# Patient Record
Sex: FEMALE | Race: WHITE | NOT HISPANIC OR LATINO | Employment: FULL TIME | URBAN - METROPOLITAN AREA
[De-identification: names, ages, dates, MRNs, and addresses within clinical notes are randomized per-mention and may not be internally consistent; named-entity substitution may affect disease eponyms.]

---

## 2022-12-16 ENCOUNTER — APPOINTMENT (OUTPATIENT)
Dept: URGENT CARE | Facility: CLINIC | Age: 57
End: 2022-12-16

## 2022-12-16 ENCOUNTER — APPOINTMENT (OUTPATIENT)
Dept: LAB | Facility: CLINIC | Age: 57
End: 2022-12-16

## 2022-12-16 DIAGNOSIS — Z02.1 PHYSICAL EXAM, PRE-EMPLOYMENT: ICD-10-CM

## 2022-12-16 LAB — RUBV IGG SERPL IA-ACNC: >175 IU/ML

## 2022-12-17 LAB
MEV IGG SER QL IA: NORMAL
MUV IGG SER QL IA: NORMAL
VZV IGG SER QL IA: NORMAL

## 2022-12-19 LAB
GAMMA INTERFERON BACKGROUND BLD IA-ACNC: 0.04 IU/ML
M TB IFN-G BLD-IMP: NEGATIVE
M TB IFN-G CD4+ BCKGRND COR BLD-ACNC: 0 IU/ML
M TB IFN-G CD4+ BCKGRND COR BLD-ACNC: 0 IU/ML
MITOGEN IGNF BCKGRD COR BLD-ACNC: 7.83 IU/ML

## 2023-02-09 ENCOUNTER — OFFICE VISIT (OUTPATIENT)
Age: 58
End: 2023-02-09

## 2023-02-09 VITALS
BODY MASS INDEX: 26.64 KG/M2 | OXYGEN SATURATION: 97 % | DIASTOLIC BLOOD PRESSURE: 82 MMHG | HEIGHT: 66 IN | RESPIRATION RATE: 16 BRPM | TEMPERATURE: 98.3 F | HEART RATE: 66 BPM | WEIGHT: 165.79 LBS | SYSTOLIC BLOOD PRESSURE: 122 MMHG

## 2023-02-09 DIAGNOSIS — I45.10 INCOMPLETE RIGHT BUNDLE BRANCH BLOCK (RBBB) DETERMINED BY ELECTROCARDIOGRAPHY: ICD-10-CM

## 2023-02-09 DIAGNOSIS — Z82.49 FAMILY HISTORY OF MI (MYOCARDIAL INFARCTION): ICD-10-CM

## 2023-02-09 DIAGNOSIS — R73.03 PREDIABETES: Primary | ICD-10-CM

## 2023-02-09 PROBLEM — Z90.710 ABSENCE OF CERVIX: Status: ACTIVE | Noted: 2018-11-06

## 2023-02-09 PROBLEM — F41.1 ANXIETY AS ACUTE REACTION TO EXCEPTIONAL STRESS: Status: ACTIVE | Noted: 2020-01-27

## 2023-02-09 PROBLEM — Z98.890: Status: ACTIVE | Noted: 2018-10-31

## 2023-02-09 PROBLEM — Z78.0 MENOPAUSE: Status: ACTIVE | Noted: 2017-04-04

## 2023-02-09 PROBLEM — Z83.3 FAMILY HISTORY OF DIABETES MELLITUS IN FIRST DEGREE RELATIVE: Status: ACTIVE | Noted: 2020-02-19

## 2023-02-09 PROBLEM — Z80.8 FAMILY HISTORY OF BASAL CELL CARCINOMA: Status: ACTIVE | Noted: 2020-02-19

## 2023-02-09 PROBLEM — F43.0 ANXIETY AS ACUTE REACTION TO EXCEPTIONAL STRESS: Status: ACTIVE | Noted: 2020-01-27

## 2023-02-09 RX ORDER — OXYBUTYNIN CHLORIDE 5 MG/1
1 TABLET, EXTENDED RELEASE ORAL DAILY
COMMUNITY
Start: 2022-05-04 | End: 2023-05-04

## 2023-02-09 RX ORDER — METFORMIN HYDROCHLORIDE 500 MG/1
500 TABLET, EXTENDED RELEASE ORAL
COMMUNITY
Start: 2022-03-21 | End: 2023-02-09 | Stop reason: SDUPTHER

## 2023-02-09 RX ORDER — METFORMIN HYDROCHLORIDE 500 MG/1
500 TABLET, EXTENDED RELEASE ORAL
Qty: 90 TABLET | Refills: 1 | Status: SHIPPED | OUTPATIENT
Start: 2023-02-09 | End: 2024-02-09

## 2023-02-09 RX ORDER — CLOBETASOL PROPIONATE 0.5 MG/G
1 CREAM TOPICAL
COMMUNITY
Start: 2022-08-24 | End: 2023-08-24

## 2023-02-09 RX ORDER — EPINEPHRINE 0.3 MG/.3ML
INJECTION SUBCUTANEOUS
COMMUNITY
Start: 2022-09-16

## 2023-02-09 RX ORDER — ESCITALOPRAM OXALATE 20 MG/1
TABLET ORAL
COMMUNITY
Start: 2022-11-11

## 2023-02-09 NOTE — ASSESSMENT & PLAN NOTE
Normal cardiac stress in 2014, asymptomatic  CABG in father around 60 y/o  Pt noted to have incomplete RBBB  Pt denies chest pain, pressure, SOB  Pt did consult with [facility] cardiology and was unhappy with her experience  Pt would like to consult [facility] at this time   Referral placed for [facility] given FH and incomplete RBBB for consult and eval

## 2023-02-09 NOTE — PROGRESS NOTES
Name: Emili Torres      : 1965      MRN: [id number]  Encounter Provider: ISIAH Garzon  Encounter Date: 2023   Encounter department: [address] [place] [address] PRIMARY CARE    Assessment & Plan     1  Prediabetes  Assessment & Plan:  Pt does have allergy to Metformin (diahrrea) but tolerate XR formula  Last A1C was 5 8  Will recheck A1C in 6 months  Orders:  -     metFORMIN (GLUCOPHAGE-XR) 500 mg 24 hr tablet; Take 1 tablet (500 mg total) by mouth daily with breakfast  -     CBC (Includes Diff/Plt) (Refl); Future  -     Comprehensive metabolic panel; Future  -     Lipid Panel with Direct LDL reflex; Future  -     TSH + Free T4; Future    2  Incomplete right bundle branch block (RBBB) determined by electrocardiography  Assessment & Plan:  Normal cardiac stress in , asymptomatic  CABG in father around 60 y/o  Pt noted to have incomplete RBBB  Pt denies chest pain, pressure, SOB  Pt did consult with [facility] cardiology and was unhappy with her experience  Pt would like to consult [facility] at this time  Referral placed for [facility] given FH and incomplete RBBB for consult and eval      Orders:  -     Ambulatory Referral to Cardiology; Future    3  BMI 26 0-26 9,adult  Assessment & Plan:  Discussed diet and exercise  Discussed low carb diet and avoiding sugary drinks  Discussed exercise  Orders:  -     CBC (Includes Diff/Plt) (Refl); Future  -     Comprehensive metabolic panel; Future  -     Lipid Panel with Direct LDL reflex; Future  -     TSH + Free T4; Future    4  Family history of MI (myocardial infarction)  Assessment & Plan:  CABG in father around 60 y/o  Pt noted to have incomplete RBBB  Pt denies chest pain, pressure, SOB  Pt did consult with [facility] cardiology and was unhappy with her experience  Pt would like to consult [facility] at this time   Referral placed for [facility] given FH and incomplete RBBB for consult and eval  Orders:  -     Ambulatory Referral to Cardiology; Future    BMI Counseling: Body mass index is 26 76 kg/m²  The BMI is above normal  Nutrition recommendations include decreasing portion sizes, encouraging healthy choices of fruits and vegetables, decreasing fast food intake, consuming healthier snacks, limiting drinks that contain sugar, moderation in carbohydrate intake, increasing intake of lean protein, reducing intake of saturated and trans fat and reducing intake of cholesterol  Exercise recommendations include moderate physical activity 150 minutes/week  Rationale for BMI follow-up plan is due to patient being overweight or obese  Depression Screening and Follow-up Plan: Patient was screened for depression during today's encounter  They screened negative with a PHQ-2 score of 0  Subjective      Pt is a new pt here to establish care  Pt does have a family hx of MI in father and brother  Pt was diagnosed with an incomplete RBBB in 2014, although stress testing in 2014 was normal  Pt was seen at [facility] cardiology and had a negative experience and is interested in a referral to [facility] for evaluation  Denies CP, SOB, pain, or difficulties with activities  Pt has a hx of prediabetes and an allergy to metformin (diarrhea), but is able to tolerate Metformin XR, and has been on this medication for quite some time without S/E  Pt's last A1C is 5 8, so controlled  Pt denies further concerns today  Recent labs reviewed from 8/22 were WNL  Review of Systems   Constitutional: Negative  HENT: Negative  Eyes: Negative  Respiratory: Negative  Cardiovascular: Negative  Gastrointestinal: Negative  Endocrine: Negative  Genitourinary: Negative  Musculoskeletal: Negative  Skin: Negative  Neurological: Negative  Hematological: Negative  Psychiatric/Behavioral: Negative          Current Outpatient Medications on File Prior to Visit   Medication Sig   • clobetasol (Dakota Rouleau) 0 05 % cream Apply 1 application topically   • EPINEPHrine (EPIPEN) 0 3 mg/0 3 mL SOAJ INJECT 0 3 MLS (0 3 MG TOTAL) INTO THE MUSCLE ONCE FOR 1 DOSE  • oxybutynin (DITROPAN-XL) 5 mg 24 hr tablet Take 1 tablet by mouth daily   • [DISCONTINUED] metFORMIN (GLUCOPHAGE-XR) 500 mg 24 hr tablet Take 500 mg by mouth daily with breakfast   • escitalopram (LEXAPRO) 20 mg tablet        Objective     /82 (BP Location: Right arm, Patient Position: Sitting, Cuff Size: Standard)   Pulse 66   Temp 98 3 °F (36 8 °C) (Tympanic)   Resp 16   Ht 5' 6" (1 676 m)   Wt 75 2 kg (165 lb 12 6 oz)   SpO2 97%   BMI 26 76 kg/m²     Physical Exam  Vitals and nursing note reviewed  Constitutional:       General: She is not in acute distress  Appearance: Normal appearance  She is not ill-appearing, toxic-appearing or diaphoretic  HENT:      Head: Normocephalic and atraumatic  Right Ear: Tympanic membrane, ear canal and external ear normal  There is no impacted cerumen  Left Ear: Tympanic membrane, ear canal and external ear normal  There is no impacted cerumen  Eyes:      General:         Right eye: No discharge  Left eye: No discharge  Conjunctiva/sclera: Conjunctivae normal    Cardiovascular:      Rate and Rhythm: Normal rate and regular rhythm  Heart sounds: Normal heart sounds  No murmur heard  No friction rub  No gallop  Pulmonary:      Effort: Pulmonary effort is normal  No respiratory distress  Breath sounds: Normal breath sounds  No wheezing or rales  Musculoskeletal:      Cervical back: Normal range of motion  Right lower leg: No edema  Left lower leg: No edema  Lymphadenopathy:      Cervical: No cervical adenopathy  Skin:     General: Skin is warm and dry  Neurological:      Mental Status: She is alert and oriented to person, place, and time     Psychiatric:         Mood and Affect: Mood normal          Behavior: Behavior normal          Thought Content: Thought content normal          Judgment: Judgment normal        Delories ISIAH Saavedra

## 2023-02-09 NOTE — ASSESSMENT & PLAN NOTE
Pt does have allergy to Metformin (diahrrea) but tolerates XR formula and has been taking for quite some time without S/E  Last A1C was 5 8  Will recheck A1C in 6 months

## 2023-02-09 NOTE — PATIENT INSTRUCTIONS
Diabetes and Exercise   WHAT YOU NEED TO KNOW:   Physical activity, such as exercise, can help keep your blood sugar level steady or improve insulin resistance  Activity can help decrease your risk for heart disease, and help you lose weight  Exercise can also help lower your A1c  Your diabetes care team will help you create an exercise plan  The plan will be based on the type of diabetes you have and your starting fitness level  DISCHARGE INSTRUCTIONS:   Call your local emergency number (911 in the [facility]) if:   You have chest pain or shortness of breath  Return to the emergency department if:   You have a low blood sugar level and it does not improve with treatment  Symptoms are trouble thinking, a pounding heartbeat, and sweating  Your blood sugar level is above 240 mg/dL and does not come down within 15 minutes of treatment  You have blurred or double vision  Your breath has a fruity, sweet smell, or your breathing is shallow  Call your doctor or diabetes care team if:   You have ketones in your blood or urine  You have a fever  Your blood sugar levels are higher than your target goals  You often have low blood sugar levels  Your skin is red, dry, warm, or swollen  You have a wound that does not heal     You have trouble coping with diabetes, or you feel anxious or depressed  You have questions or concerns about your condition or care  Tips to help you create and meet your exercise goals:   Set a goal for 150 minutes (2 5 hours) of moderate to vigorous aerobic activity each week  Aerobic activity helps your heart stay strong  Aerobic activity includes walking, bicycling, dancing, swimming, and raking leaves  Spread aerobic activity over 3 to 5 days  Do not take more than 2 days off in a row  It is best to do at least 10 minutes at a time and 30 minutes each day  You can work up to these goals  Remember that any activity is better than no activity   Over time, you can make exercise more intense or last longer  You can also add more days of exercise as your fitness level improves  Your diabetes care team can help you make a step-by-step plan to achieve your goals  Set a strength training goal of 2 to 3 times a week  Take at least 1 day off in between strength training sessions  Strength training helps you keep the muscles you have and build new muscles  Strength training includes lifting weights, climbing stairs, yoga, and fouzia chi          Older adults should include balance training 2 to 3 times each week  These include walking backwards, standing on one foot, and walking heel to toe in a straight line  Other healthy exercise tips:   Stretch before and after you exercise to prevent injury  Drink water or liquids that do not contain sugar before, during, and after exercise  Ask your dietitian or healthcare provider which liquids you should drink when you exercise  Do not sit for longer than 30 minutes at a time during your day  If you cannot walk around, at least stand up  This will help you stay active and keep your blood circulating  Exercise and blood sugar levels:  Check your blood sugar level before and after exercise, if you use insulin  Healthcare providers may tell you to change the amount of insulin you take or food you eat  If your blood sugar level is high, check your blood or urine for ketones before you exercise  Do not exercise if your blood sugar level is high and you have ketones  If your blood sugar level is less than 100 mg/dL, have a carbohydrate snack before you exercise  Examples are 4 to 6 crackers, ½ banana, 8 ounces (1 cup) of milk, or 4 ounces (½ cup) of juice  Follow up with your doctor or diabetes care team as directed:  Write down your questions so you remember to ask them during your visits    © Copyright [employer] 2022 Information is for End User's use only and may not be sold, redistributed or otherwise used for commercial purposes  All illustrations and images included in CareNotes® are the copyrighted property of A D A M [employer] Inc  or Savanna Zambrano  The above information is an  only  It is not intended as medical advice for individual conditions or treatments  Talk to your doctor, nurse or pharmacist before following any medical regimen to see if it is safe and effective for you

## 2023-02-09 NOTE — ASSESSMENT & PLAN NOTE
CABG in father around 60 y/o  Pt noted to have incomplete RBBB  Pt denies chest pain, pressure, SOB  Pt did consult with [facility] cardiology and was unhappy with her experience  Pt would like to consult [facility] at this time   Referral placed for [facility] given FH and incomplete RBBB for consult and eval

## 2023-02-09 NOTE — ASSESSMENT & PLAN NOTE
Discussed diet and exercise  Discussed low carb diet and avoiding sugary drinks  Discussed exercise

## 2023-05-05 ENCOUNTER — TELEPHONE (OUTPATIENT)
Age: 58
End: 2023-05-05

## 2023-05-05 NOTE — TELEPHONE ENCOUNTER
Patient needs the following medication refills sent to [facility]      Oxybutynin extended release 5 mg  Generic Lexapro 20 mg

## 2023-05-07 DIAGNOSIS — F41.1 ANXIETY AS ACUTE REACTION TO EXCEPTIONAL STRESS: Primary | ICD-10-CM

## 2023-05-07 DIAGNOSIS — F43.0 ANXIETY AS ACUTE REACTION TO EXCEPTIONAL STRESS: Primary | ICD-10-CM

## 2023-05-07 DIAGNOSIS — N32.81 OAB (OVERACTIVE BLADDER): ICD-10-CM

## 2023-05-07 RX ORDER — OXYBUTYNIN CHLORIDE 5 MG/1
5 TABLET, EXTENDED RELEASE ORAL DAILY
Qty: 90 TABLET | Refills: 3 | Status: SHIPPED | OUTPATIENT
Start: 2023-05-07 | End: 2024-05-06

## 2023-05-07 RX ORDER — ESCITALOPRAM OXALATE 20 MG/1
20 TABLET ORAL DAILY
Qty: 90 TABLET | Refills: 3 | Status: SHIPPED | OUTPATIENT
Start: 2023-05-07

## 2023-05-18 DIAGNOSIS — R73.03 PREDIABETES: ICD-10-CM

## 2023-05-18 RX ORDER — METFORMIN HYDROCHLORIDE 500 MG/1
500 TABLET, EXTENDED RELEASE ORAL
Qty: 90 TABLET | Refills: 1 | Status: SHIPPED | OUTPATIENT
Start: 2023-05-18

## 2023-05-18 NOTE — TELEPHONE ENCOUNTER
Patient requesting medication refill   metFORMIN (GLUCOPHAGE-XR) 500 mg 24 hr tablet TAKE 1 TABLET BY MOUTH EVERY DAY WITH BREAKFAST     90 day supply with 1 refill pending   [address]

## 2023-06-16 ENCOUNTER — CONSULT (OUTPATIENT)
Dept: CARDIOLOGY CLINIC | Facility: CLINIC | Age: 58
End: 2023-06-16
Payer: COMMERCIAL

## 2023-06-16 VITALS
TEMPERATURE: 97.7 F | SYSTOLIC BLOOD PRESSURE: 126 MMHG | HEIGHT: 66 IN | WEIGHT: 171.8 LBS | HEART RATE: 65 BPM | BODY MASS INDEX: 27.61 KG/M2 | DIASTOLIC BLOOD PRESSURE: 82 MMHG

## 2023-06-16 DIAGNOSIS — R07.2 PRECORDIAL PAIN: ICD-10-CM

## 2023-06-16 DIAGNOSIS — I34.0 MILD MITRAL REGURGITATION: ICD-10-CM

## 2023-06-16 DIAGNOSIS — Z82.49 FAMILY HISTORY OF CORONARY ARTERY DISEASE: ICD-10-CM

## 2023-06-16 DIAGNOSIS — I07.1 MILD TRICUSPID REGURGITATION: ICD-10-CM

## 2023-06-16 DIAGNOSIS — R73.03 PREDIABETES: ICD-10-CM

## 2023-06-16 DIAGNOSIS — R94.31 ABNORMAL ECG DURING EXERCISE STRESS TEST: ICD-10-CM

## 2023-06-16 DIAGNOSIS — I45.10 INCOMPLETE RIGHT BUNDLE BRANCH BLOCK (RBBB) DETERMINED BY ELECTROCARDIOGRAPHY: ICD-10-CM

## 2023-06-16 DIAGNOSIS — R00.0 RAPID HEART RATE: Primary | ICD-10-CM

## 2023-06-16 DIAGNOSIS — R94.31 SHORTENED PR INTERVAL: ICD-10-CM

## 2023-06-16 PROCEDURE — 99244 OFF/OP CNSLTJ NEW/EST MOD 40: CPT | Performed by: INTERNAL MEDICINE

## 2023-06-16 NOTE — PATIENT INSTRUCTIONS
14 day ZIO will be mailed to you  Echocardiogram to assess heart structure and function  Stress echocardiogram   Consider statin therapy (rosuvastatin) pending results of repeat lipid panel  I will review ECG with one of our EP doctors

## 2023-06-16 NOTE — PROGRESS NOTES
Cardiology Office Visit    Clayton Lubin  [id number]  1965    [facility]    [address] [address] [place] [address] [phone]      Dear ISIAH Davis,    I had the pleasure of seeing your patient at our [facility] Cardiology [place] office today 6/16/2023  As you know she is a pleasant 62y o  year old female with a medical history as described below  Reason for office visit: Evaluation for abnormal ECG and family history of coronary artery and vascular disease  1  Rapid heart rate  Assessment & Plan:  Patient notes intermittent rapid heart rates  ECG with short MI interval (no delta waves)  Prior history of syncope x 2  Recommended echocardiogram to assess heart structure and function  Stress echocardiogram    14 day ZIO monitor  Orders:  -     AMB extended holter monitor; Future; Expected date: 06/16/2023  -     Echo complete w/ contrast if indicated; Future; Expected date: 06/16/2023  -     Echo stress test, exercise; Future; Expected date: 06/16/2023  -     POCT ECG    2  Shortened MI interval  Assessment & Plan:  Short MI interval noted on ECG 6/16/2023 (92 ms) without delta waves  Prior episodes of syncope x 2  Non ischemic stress echocardiogram 2014  Exercise stress test by cardiology report was positive for ischemia prompting stress echocardiogram (presumed false positive as imaging showed no ischemia)  Testing as outlined under rapid heart rates  Will review ECG with EP to see if they have an additional recommendations  3  Precordial pain  Assessment & Plan:  Intermittent episodes of sharp centrally located chest pain  Has had episode followed by chest pressure and dyspnea on exertion  False positive exercise stress test by report of prior cardiologist    Echocardiogram 11/2014 unremarkable with normal EF and mild mitral and tricuspid regurgitation     Family history of coronary artery disease in father (MI and CABG) and brother (details pending)  Recommend updated echocardiogram and stress echocardiogram    ASCVD risk is < 10%  Orders:  -     Echo complete w/ contrast if indicated; Future; Expected date: 06/16/2023  -     Echo stress test, exercise; Future; Expected date: 06/16/2023    4  Abnormal ECG during exercise stress test  Assessment & Plan:  False positive exercise stress test documented in cardiology note 12/3/2014  Stress echocardiogram was done for follow up 12/2014 and showed no evidence of ischemia  5  Incomplete right bundle branch block (RBBB) determined by electrocardiography  Assessment & Plan:  Incomplete RBBB noted as far back as 2014  Underwent exercise stress test with presumed false positive response to exercise resulting in stress echocardiogram which showed no ischemia  Echocardiogram 12/2014 with normal EF and mild mitral and tricuspid regurgitation  Short AK interval on ECG 6/16/2023  See full plan under rapid heart rates and precordial pain  Orders:  -     Ambulatory Referral to Cardiology  -     Echo complete w/ contrast if indicated; Future; Expected date: 06/16/2023  -     Echo stress test, exercise; Future; Expected date: 06/16/2023    6  Family history of coronary artery disease  Assessment & Plan:  Father with MI in either 42's or 52's and eventual CABG  Recommend aggressive risk factor modification  Ideally would like to see LDL < 100 and perhaps < 70 given 'prediabetes'  Will plan updated lipid panel  Orders:  -     Ambulatory Referral to Cardiology  -     Echo complete w/ contrast if indicated; Future; Expected date: 06/16/2023  -     Echo stress test, exercise; Future; Expected date: 06/16/2023    7  Prediabetes  Assessment & Plan:  HA1C 5 8 08/23/2022 on metformin  8  Mild mitral regurgitation  Assessment & Plan:  Mild mitral and tricuspid regurgitation on echocardiogram 11/5/2014     No significant valvular abnormality  Updated echocardiogram ordered for rapid heart rates and precordial pain  9  Mild tricuspid regurgitation  Assessment & Plan:  See discussion under mild mitral regurgitation  JENNIE Narvaez has a past medical history of pre diabetes, depression, urge incontinence, family history of coronary artery disease and vascular disease  She has also been noted to have an abnormal ECG with an incomplete RBBB  Patient was diagnosed with hypothyroid in the past and was on levothyroxine (will need to confirm this at follow up)  Family history of father having MI in his 42's or 52's and eventual CABG  Her brother was noted to have a 'mini stroke' as well as some type of heart disease  She will try to obtain more details  She was evaluated 12/3/2014 at [facility] cardiology by Dr Marnie Bryant for chest tightness and heart pounding  underwent cardiac testing in 2014 at which time she had an exercise stress test done which was positive for ischemia resulting in stress echocardiogram which showed no evidence of ischemia  She also had a Holter monitor done which showed rare PAC's and PVC's  Echocardiogram 11/5/2014 was unremarkable  EF 60%  6/16/2023Pinojosefina Pinedomargot presents to the office today for evaluation as outlined  She tells me that she had an episode of sharp chest pain, centrally located without radiation that lasted about 10 minutes  She then noted a pressure in her chest and dyspnea on exertion which lasted a few days  She tells me that she has had sporadic episodes similar to this  She does note fatigue and snoring  No prior sleep study per report  No formal exercise program      She does note palpitations recently  Feels like heart rates are fast  ECG today notable for incomplete right bundle branch block and short SC interval (no delta waves noted)  She does have a history of synope x 2   No prodrome with either episode per her report however records 10/27/2021 suggest that she had a 'warm feeling' as well as lightheadedness  She had received her 3rd Covid vaccine the day prior  Lipid panel reviewed from 8/2022  Occasional non positional lightheadedness           Patient Active Problem List   Diagnosis   • Absence of cervix   • Anxiety as acute reaction to exceptional stress   • Contact dermatitis and other eczema, due to unspecified cause   • Mild tricuspid regurgitation   • Family history of basal cell carcinoma   • Family history of diabetes mellitus in first degree relative   • Family history of coronary artery disease   • History of decompression of both ulnar nerves   • Incomplete right bundle branch block (RBBB) determined by electrocardiography   • Menopause   • Abnormal ECG during exercise stress test   • Prediabetes   • BMI 26 0-26 9,adult   • Rapid heart rate   • Precordial pain   • Shortened WV interval   • Mild mitral regurgitation     Past Medical History:   Diagnosis Date   • Anxiety    • Depression    • Hypothyroidism     was previously on medication   • Prediabetes    • Syncope    • Urge incontinence    • Vitamin D deficiency      Social History     Socioeconomic History   • Marital status: /Civil Union     Spouse name: Damian Mcgraw   • Number of children: 6   • Years of education: Not on file   • Highest education level: Not on file   Occupational History   • Not on file   Tobacco Use   • Smoking status: Never   • Smokeless tobacco: Never   Vaping Use   • Vaping Use: Never used   Substance and Sexual Activity   • Alcohol use: Yes     Comment: social   • Drug use: Never   • Sexual activity: Not on file     Comment: Defer   Other Topics Concern   • Not on file   Social History Narrative   • Not on file     Social Determinants of Health     Financial Resource Strain: Not on file   Food Insecurity: Not on file   Transportation Needs: Not on file   Physical Activity: Not on file   Stress: Not on file   Social Connections: Not on file   Intimate Partner Violence: Not on file   Housing Stability: Not on file      Family History   Problem Relation Age of Onset   • Diabetes Mother    • Hypertension Mother    • Coronary artery disease Father         Dad  with MI 52's   at age 66   • Diabetes Father    • Basal cell carcinoma Father    • Parkinsonism Father    • Hypertension Brother    • Transient ischemic attack Brother    • Heart disease Brother    • Breast cancer Paternal Aunt      Past Surgical History:   Procedure Laterality Date   • CHOLECYSTECTOMY     • HYSTERECTOMY     • TONSILLECTOMY     • ULNAR TUNNEL RELEASE         Current Outpatient Medications:   •  clobetasol (TEMOVATE) 0 05 % cream, Apply 1 application topically, Disp: , Rfl:   •  Cyanocobalamin (VITAMIN B 12 PO), Take by mouth, Disp: , Rfl:   •  EPINEPHrine (EPIPEN) 0 3 mg/0 3 mL SOAJ, INJECT 0 3 MLS (0 3 MG TOTAL) INTO THE MUSCLE ONCE FOR 1 DOSE , Disp: , Rfl:   •  escitalopram (LEXAPRO) 20 mg tablet, Take 1 tablet (20 mg total) by mouth daily, Disp: 90 tablet, Rfl: 3  •  metFORMIN (GLUCOPHAGE-XR) 500 mg 24 hr tablet, Take 1 tablet (500 mg total) by mouth daily with breakfast, Disp: 90 tablet, Rfl: 1  •  oxybutynin (DITROPAN-XL) 5 mg 24 hr tablet, Take 1 tablet (5 mg total) by mouth daily, Disp: 90 tablet, Rfl: 3       Allergies   Allergen Reactions   • Bee Venom Swelling and Vomiting   • Metformin Diarrhea   • Other Blisters     Holter Monitor Stickers - Other reaction(s): very sensitive (even to sensitive stickers) - causes welts        Cardiac Test Results:     ECG 2023: Sinus bradycardia  56 bpm  Short KS interval  Rightward axis  Non specific ST T wave abnormality  Stress echocardiogram 2014:  Kevin protocol  9:00  10 1 METs  105% of MPHR  Resting ECG: IRBBB  Exercise EC mm ST horizontal depression  Overall left ventricular function improves with exercise and chamber size decreases with exercise     Negative exercise stress echocardiography for ischemia      Positive exercise ECG for ischemia - false positive given "normal echocardiographic images  Exercise stress test 11/2014 (not available for review)  Echocardiogram 11/5/2014:  EF 60%  Normal diastolic function  Mild mitral and tricuspid regurgitation  No significant valvular abnormality         Review of Systems   Constitutional: Positive for fatigue  Negative for activity change and appetite change  HENT: Negative for congestion, hearing loss, tinnitus and trouble swallowing  Eyes: Negative for visual disturbance  Respiratory: Negative for cough, chest tightness, shortness of breath and wheezing  Cardiovascular: Positive for chest pain and palpitations (rapid heart rates)  Negative for leg swelling  Gastrointestinal: Negative for abdominal distention, abdominal pain, nausea and vomiting  Genitourinary: Negative for difficulty urinating  Musculoskeletal: Negative for arthralgias  Skin: Negative for rash  Neurological: Positive for syncope and light-headedness  Negative for dizziness  Hematological: Does not bruise/bleed easily  Psychiatric/Behavioral: Negative for confusion  The patient is not nervous/anxious  Depression    All other systems reviewed and are negative  Vitals:    06/16/23 1432 06/16/23 1518   BP: 118/78 126/82   BP Location: Left arm    Patient Position: Sitting    Cuff Size: Adult    Pulse: 65    Temp: 97 7 °F (36 5 °C)    Weight: 77 9 kg (171 lb 12 8 oz)    Height: 5' 6\" (1 676 m)      Vitals:    06/16/23 1432   Weight: 77 9 kg (171 lb 12 8 oz)     Height: 5' 6\" (167 6 cm)     Physical Exam  Vitals reviewed  Constitutional:       Appearance: She is well-developed  HENT:      Head: Normocephalic and atraumatic  Eyes:      Conjunctiva/sclera: Conjunctivae normal       Pupils: Pupils are equal, round, and reactive to light  Neck:      Vascular: No JVD  Cardiovascular:      Rate and Rhythm: Normal rate and regular rhythm  Heart sounds: Normal heart sounds  No murmur heard  No friction rub   " No gallop  Pulmonary:      Effort: Pulmonary effort is normal       Breath sounds: Normal breath sounds  Abdominal:      General: Bowel sounds are normal       Palpations: Abdomen is soft  Musculoskeletal:      Cervical back: Normal range of motion  Skin:     General: Skin is warm and dry  Neurological:      Mental Status: She is alert and oriented to person, place, and time     Psychiatric:         Behavior: Behavior normal

## 2023-06-18 ENCOUNTER — PATIENT MESSAGE (OUTPATIENT)
Dept: CARDIOLOGY CLINIC | Facility: CLINIC | Age: 58
End: 2023-06-18

## 2023-06-18 DIAGNOSIS — E78.2 MIXED HYPERLIPIDEMIA: ICD-10-CM

## 2023-06-19 ENCOUNTER — APPOINTMENT (OUTPATIENT)
Age: 58
End: 2023-06-19

## 2023-06-19 DIAGNOSIS — Z00.8 HEALTH EXAMINATION IN POPULATION SURVEY: ICD-10-CM

## 2023-06-19 DIAGNOSIS — R73.03 PREDIABETES: ICD-10-CM

## 2023-06-19 LAB
ALBUMIN SERPL BCP-MCNC: 4 G/DL (ref 3.5–5)
ALP SERPL-CCNC: 59 U/L (ref 46–116)
ALT SERPL W P-5'-P-CCNC: 23 U/L (ref 12–78)
ANION GAP SERPL CALCULATED.3IONS-SCNC: 0 MMOL/L (ref 4–13)
AST SERPL W P-5'-P-CCNC: 19 U/L (ref 5–45)
BASOPHILS # BLD AUTO: 0.06 THOUSANDS/ÂΜL (ref 0–0.1)
BASOPHILS NFR BLD AUTO: 1 % (ref 0–1)
BILIRUB SERPL-MCNC: 0.69 MG/DL (ref 0.2–1)
BUN SERPL-MCNC: 9 MG/DL (ref 5–25)
CALCIUM SERPL-MCNC: 9.4 MG/DL (ref 8.3–10.1)
CHLORIDE SERPL-SCNC: 109 MMOL/L (ref 96–108)
CHOLEST SERPL-MCNC: 255 MG/DL
CO2 SERPL-SCNC: 30 MMOL/L (ref 21–32)
CREAT SERPL-MCNC: 0.94 MG/DL (ref 0.6–1.3)
EOSINOPHIL # BLD AUTO: 0.37 THOUSAND/ÂΜL (ref 0–0.61)
EOSINOPHIL NFR BLD AUTO: 7 % (ref 0–6)
ERYTHROCYTE [DISTWIDTH] IN BLOOD BY AUTOMATED COUNT: 11.7 % (ref 11.6–15.1)
GFR SERPL CREATININE-BSD FRML MDRD: 67 ML/MIN/1.73SQ M
GLUCOSE P FAST SERPL-MCNC: 98 MG/DL (ref 65–99)
HCT VFR BLD AUTO: 40.5 % (ref 34.8–46.1)
HDLC SERPL-MCNC: 72 MG/DL
HGB BLD-MCNC: 14.1 G/DL (ref 11.5–15.4)
IMM GRANULOCYTES # BLD AUTO: 0.02 THOUSAND/UL (ref 0–0.2)
IMM GRANULOCYTES NFR BLD AUTO: 0 % (ref 0–2)
LDLC SERPL CALC-MCNC: 170 MG/DL (ref 0–100)
LYMPHOCYTES # BLD AUTO: 2.21 THOUSANDS/ÂΜL (ref 0.6–4.47)
LYMPHOCYTES NFR BLD AUTO: 39 % (ref 14–44)
MCH RBC QN AUTO: 33 PG (ref 26.8–34.3)
MCHC RBC AUTO-ENTMCNC: 34.8 G/DL (ref 31.4–37.4)
MCV RBC AUTO: 95 FL (ref 82–98)
MONOCYTES # BLD AUTO: 0.46 THOUSAND/ÂΜL (ref 0.17–1.22)
MONOCYTES NFR BLD AUTO: 8 % (ref 4–12)
NEUTROPHILS # BLD AUTO: 2.59 THOUSANDS/ÂΜL (ref 1.85–7.62)
NEUTS SEG NFR BLD AUTO: 45 % (ref 43–75)
NRBC BLD AUTO-RTO: 0 /100 WBCS
PLATELET # BLD AUTO: 281 THOUSANDS/UL (ref 149–390)
PMV BLD AUTO: 9.6 FL (ref 8.9–12.7)
POTASSIUM SERPL-SCNC: 4.6 MMOL/L (ref 3.5–5.3)
PROT SERPL-MCNC: 7.3 G/DL (ref 6.4–8.4)
RBC # BLD AUTO: 4.27 MILLION/UL (ref 3.81–5.12)
SODIUM SERPL-SCNC: 139 MMOL/L (ref 135–147)
TRIGL SERPL-MCNC: 67 MG/DL
WBC # BLD AUTO: 5.71 THOUSAND/UL (ref 4.31–10.16)

## 2023-06-19 PROCEDURE — 83036 HEMOGLOBIN GLYCOSYLATED A1C: CPT

## 2023-06-19 PROCEDURE — 36415 COLL VENOUS BLD VENIPUNCTURE: CPT

## 2023-06-19 PROCEDURE — 80061 LIPID PANEL: CPT

## 2023-06-19 PROCEDURE — 85025 COMPLETE CBC W/AUTO DIFF WBC: CPT

## 2023-06-19 PROCEDURE — 80053 COMPREHEN METABOLIC PANEL: CPT

## 2023-06-19 PROCEDURE — 84443 ASSAY THYROID STIM HORMONE: CPT

## 2023-06-20 PROBLEM — I34.0 MILD MITRAL REGURGITATION: Status: ACTIVE | Noted: 2023-06-20

## 2023-06-20 LAB
EST. AVERAGE GLUCOSE BLD GHB EST-MCNC: 117 MG/DL
HBA1C MFR BLD: 5.7 %
TSH SERPL DL<=0.05 MIU/L-ACNC: 3.46 UIU/ML (ref 0.45–4.5)

## 2023-06-20 PROCEDURE — 93000 ELECTROCARDIOGRAM COMPLETE: CPT | Performed by: INTERNAL MEDICINE

## 2023-06-20 NOTE — ASSESSMENT & PLAN NOTE
Father with MI in either 42's or 52's and eventual CABG  Recommend aggressive risk factor modification  Ideally would like to see LDL < 100 and perhaps < 70 given 'prediabetes'  Will plan updated lipid panel

## 2023-06-20 NOTE — ASSESSMENT & PLAN NOTE
False positive exercise stress test documented in cardiology note 12/3/2014  Stress echocardiogram was done for follow up 12/2014 and showed no evidence of ischemia

## 2023-06-20 NOTE — ASSESSMENT & PLAN NOTE
Mild mitral and tricuspid regurgitation on echocardiogram 11/5/2014  No significant valvular abnormality  Updated echocardiogram ordered for rapid heart rates and precordial pain

## 2023-06-20 NOTE — ASSESSMENT & PLAN NOTE
Short VT interval noted on ECG 6/16/2023 (92 ms) without delta waves  Prior episodes of syncope x 2  Non ischemic stress echocardiogram 2014  Exercise stress test by cardiology report was positive for ischemia prompting stress echocardiogram (presumed false positive as imaging showed no ischemia)  Testing as outlined under rapid heart rates  Will review ECG with EP to see if they have an additional recommendations

## 2023-06-20 NOTE — ASSESSMENT & PLAN NOTE
Intermittent episodes of sharp centrally located chest pain  Has had episode followed by chest pressure and dyspnea on exertion  False positive exercise stress test by report of prior cardiologist    Echocardiogram 11/2014 unremarkable with normal EF and mild mitral and tricuspid regurgitation  Family history of coronary artery disease in father (MI and CABG) and brother (details pending)  Recommend updated echocardiogram and stress echocardiogram    ASCVD risk is < 10%

## 2023-06-20 NOTE — ASSESSMENT & PLAN NOTE
Incomplete RBBB noted as far back as 2014  Underwent exercise stress test with presumed false positive response to exercise resulting in stress echocardiogram which showed no ischemia  Echocardiogram 12/2014 with normal EF and mild mitral and tricuspid regurgitation  Short AK interval on ECG 6/16/2023  See full plan under rapid heart rates and precordial pain

## 2023-06-20 NOTE — ASSESSMENT & PLAN NOTE
Patient notes intermittent rapid heart rates  ECG with short MI interval (no delta waves)  Prior history of syncope x 2  Recommended echocardiogram to assess heart structure and function  Stress echocardiogram    14 day ZIO monitor

## 2023-06-29 RX ORDER — ROSUVASTATIN CALCIUM 5 MG/1
5 TABLET, COATED ORAL DAILY
Qty: 30 TABLET | Refills: 11 | Status: SHIPPED | OUTPATIENT
Start: 2023-06-29

## 2023-07-17 ENCOUNTER — CLINICAL SUPPORT (OUTPATIENT)
Dept: CARDIOLOGY CLINIC | Facility: CLINIC | Age: 58
End: 2023-07-17
Payer: COMMERCIAL

## 2023-07-17 DIAGNOSIS — R00.0 RAPID HEART RATE: ICD-10-CM

## 2023-07-17 PROCEDURE — 93248 EXT ECG>7D<15D REV&INTERPJ: CPT | Performed by: NURSE PRACTITIONER

## 2023-07-18 ENCOUNTER — TELEPHONE (OUTPATIENT)
Dept: NON INVASIVE DIAGNOSTICS | Facility: HOSPITAL | Age: 58
End: 2023-07-18

## 2023-07-18 DIAGNOSIS — I47.1 SVT (SUPRAVENTRICULAR TACHYCARDIA) (HCC): Primary | ICD-10-CM

## 2023-07-18 RX ORDER — METOPROLOL SUCCINATE 25 MG/1
12.5 TABLET, EXTENDED RELEASE ORAL DAILY
Qty: 15 TABLET | Refills: 0 | Status: SHIPPED | OUTPATIENT
Start: 2023-07-18 | End: 2023-07-28 | Stop reason: SDUPTHER

## 2023-07-18 NOTE — TELEPHONE ENCOUNTER
I spoke with Radha Olvera. We reviewed Dr. Hebert Butt result note regarding her ZIO results. She is agreeable to metoprolol succinate 12.5 mg daily. She is aware this is a half tablet. I ordered a 1 month supply to local and she will message me in the next 2 weeks - if she tolerates I will send to mail order. I also let her know I sent the results to Dr. Hiwot Chang to review and will let her know if he has any additional recommendations.

## 2023-07-18 NOTE — TELEPHONE ENCOUNTER
I attempted to reach [facility] to review the results and discuss beta blocker. I left her a voicemail asking her to give us a call back. OK to page me if she calls.   Thanks

## 2023-07-18 NOTE — TELEPHONE ENCOUNTER
----- Message from [facility], DO sent at 7/17/2023 10:17 PM EDT -----  Patient had a min HR of 41 bpm, max HR of 203 bpm, and avg HR of 64 bpm.  Predominant underlying rhythm was Sinus Rhythm. QRS morphology changes werepresent throughout recording. 29 Supraventricular Tachycardia runs occurred, the run with the fastest interval lasting 10.7 secs with a max rate of 203 bpm (avg 180bpm); the run with the fastest interval was also the longest. Supraventricular Tachycardia was detected within +/- 45 seconds of symptomatic patient event(s). Isolated SVEs were rare (<1.0%), SVE Couplets were rare (<1.0%), and SVE Triplets were rare (<1.0%). Isolated VEs were rare (<1.0%), and no VE Couplets or VE Triplets were present. Strips above reviewed. Agree with findings as documented. ZIO 6/21-7/5/2023:  Min HR 41. Max HR in . Avg HR 64 bpm.   29 runs of SVT with the longest run lasting 10.7 seconds with an average heart rate of 180 bpm.   Rare PAC's. Rare PVC's. 13 triggers and 10 diary entries. Most symptoms correlated with normal sinus rhythm. Fluttering/racing symptoms correlated with the longest run of SVT and one episode of possible atrial tachycardia and one episode of normal sinus rhythm with transition into possible atrial tachycardia. Will review with EP (Dr. Lexy Shin) to get his opinion on rhythm. Patient may benefit from the addition of low dose beta blocker for now. ## Kala Bernstein, can you please see if Dr. Lexy Shin can review and give his opinion on rhythm. I would recommend adding low dose beta blocker for now. Can you please call patient to discuss? Thank you.

## 2023-07-18 NOTE — TELEPHONE ENCOUNTER
Pt CHINO stating that she is returning a call from [facility] please call [phone]

## 2023-07-28 ENCOUNTER — HOSPITAL ENCOUNTER (OUTPATIENT)
Dept: NON INVASIVE DIAGNOSTICS | Facility: HOSPITAL | Age: 58
Discharge: HOME | End: 2023-07-28
Attending: INTERNAL MEDICINE
Payer: COMMERCIAL

## 2023-07-28 ENCOUNTER — TELEPHONE (OUTPATIENT)
Dept: CARDIOLOGY CLINIC | Facility: CLINIC | Age: 58
End: 2023-07-28

## 2023-07-28 VITALS
DIASTOLIC BLOOD PRESSURE: 82 MMHG | SYSTOLIC BLOOD PRESSURE: 126 MMHG | WEIGHT: 171.74 LBS | BODY MASS INDEX: 27.6 KG/M2 | HEART RATE: 55 BPM | HEIGHT: 66 IN

## 2023-07-28 VITALS — BODY MASS INDEX: 27.48 KG/M2 | HEIGHT: 66 IN | WEIGHT: 171 LBS

## 2023-07-28 DIAGNOSIS — R00.0 RAPID HEART RATE: ICD-10-CM

## 2023-07-28 DIAGNOSIS — R07.2 PRECORDIAL PAIN: ICD-10-CM

## 2023-07-28 DIAGNOSIS — I45.10 INCOMPLETE RIGHT BUNDLE BRANCH BLOCK (RBBB) DETERMINED BY ELECTROCARDIOGRAPHY: ICD-10-CM

## 2023-07-28 DIAGNOSIS — I47.1 SVT (SUPRAVENTRICULAR TACHYCARDIA) (HCC): ICD-10-CM

## 2023-07-28 DIAGNOSIS — Z82.49 FAMILY HISTORY OF CORONARY ARTERY DISEASE: ICD-10-CM

## 2023-07-28 LAB
AORTIC ROOT: 3 CM
AORTIC VALVE MEAN VELOCITY: 8.1 M/S
APICAL FOUR CHAMBER EJECTION FRACTION: 64 %
ASCENDING AORTA: 2.8 CM
AV AREA BY CONTINUOUS VTI: 2.1 CM2
AV AREA PEAK VELOCITY: 2 CM2
AV LVOT MEAN GRADIENT: 1 MMHG
AV LVOT PEAK GRADIENT: 3 MMHG
AV MEAN GRADIENT: 3 MMHG
AV PEAK GRADIENT: 6 MMHG
AV VALVE AREA: 2.06 CM2
AV VELOCITY RATIO: 0.7
DOP CALC AO PEAK VEL: 1.21 M/S
DOP CALC AO VTI: 28.36 CM
DOP CALC LVOT AREA: 2.83 CM2
DOP CALC LVOT CARDIAC INDEX: 1.59 L/MIN/M2
DOP CALC LVOT CARDIAC OUTPUT: 2.98 L/MIN
DOP CALC LVOT DIAMETER: 1.9 CM
DOP CALC LVOT PEAK VEL VTI: 20.61 CM
DOP CALC LVOT PEAK VEL: 0.85 M/S
DOP CALC LVOT STROKE INDEX: 30.5 ML/M2
DOP CALC LVOT STROKE VOLUME: 58.41
E WAVE DECELERATION TIME: 256 MS
FRACTIONAL SHORTENING: 36 (ref 28–44)
INTERVENTRICULAR SEPTUM IN DIASTOLE (PARASTERNAL SHORT AXIS VIEW): 0.8 CM
INTERVENTRICULAR SEPTUM: 0.8 CM (ref 0.6–1.1)
IVC: 18 MM
LAAS-AP2: 16.5 CM2
LAAS-AP4: 12.3 CM2
LEFT ATRIUM SIZE: 3.1 CM
LEFT ATRIUM VOLUME (MOD BIPLANE): 38 ML
LEFT INTERNAL DIMENSION IN SYSTOLE: 2.7 CM (ref 2.1–4)
LEFT VENTRICLE DIASTOLIC VOLUME (MOD BIPLANE): 58 ML
LEFT VENTRICLE SYSTOLIC VOLUME (MOD BIPLANE): 22 ML
LEFT VENTRICULAR INTERNAL DIMENSION IN DIASTOLE: 4.2 CM (ref 3.5–6)
LEFT VENTRICULAR POSTERIOR WALL IN END DIASTOLE: 0.8 CM
LEFT VENTRICULAR STROKE VOLUME: 53 ML
LV EF: 63 %
LVSV (TEICH): 53 ML
MAX HR PERCENT: 93 %
MAX HR: 151 BPM
MV E'TISSUE VEL-LAT: 10 CM/S
MV E'TISSUE VEL-SEP: 13 CM/S
MV PEAK A VEL: 0.29 M/S
MV PEAK E VEL: 76 CM/S
MV STENOSIS PRESSURE HALF TIME: 74 MS
MV VALVE AREA P 1/2 METHOD: 2.97
PV PEAK GRADIENT: 2 MMHG
RA PRESSURE ESTIMATED: 3 MMHG
RATE PRESSURE PRODUCT: NORMAL
RIGHT ATRIUM AREA SYSTOLE A4C: 18.5 CM2
RIGHT VENTRICLE ID DIMENSION: 4 CM
SL CV LEFT ATRIUM LENGTH A2C: 5.4 CM
SL CV LV EF: 6064
SL CV PED ECHO LEFT VENTRICLE DIASTOLIC VOLUME (MOD BIPLANE) 2D: 79 ML
SL CV PED ECHO LEFT VENTRICLE SYSTOLIC VOLUME (MOD BIPLANE) 2D: 27 ML
SL CV STRESS RECOVERY BP: NORMAL MMHG
SL CV STRESS RECOVERY HR: 66 BPM
SL CV STRESS RECOVERY O2 SAT: 98 %
SL CV STRESS STAGE REACHED: 3
STRESS ANGINA INDEX: 0
STRESS BASELINE BP: NORMAL MMHG
STRESS BASELINE HR: 50 BPM
STRESS DUKE TREADMILL SCORE: 4
STRESS O2 SAT REST: 96 %
STRESS PEAK HR: 151 BPM
STRESS POST ESTIMATED WORKLOAD: 10.1 METS
STRESS POST EXERCISE DUR MIN: 9 MIN
STRESS POST EXERCISE DUR SEC: 0 SEC
STRESS POST O2 SAT PEAK: 97 %
STRESS POST PEAK BP: 180 MMHG
STRESS ST ELEVATION: 1 MM
TRICUSPID ANNULAR PLANE SYSTOLIC EXCURSION: 2.8 CM

## 2023-07-28 PROCEDURE — 93350 STRESS TTE ONLY: CPT

## 2023-07-28 PROCEDURE — 93306 TTE W/DOPPLER COMPLETE: CPT

## 2023-07-28 RX ORDER — METOPROLOL SUCCINATE 25 MG/1
25 TABLET, EXTENDED RELEASE ORAL DAILY
Qty: 90 TABLET | Refills: 3 | Status: SHIPPED | OUTPATIENT
Start: 2023-07-28

## 2023-07-28 NOTE — TELEPHONE ENCOUNTER
I spoke with Ivory Dumont. Her echocardiogram and stress echo showed normal heart function with no evidence of ischemia. She is tolerating the full 25 mg of metoprolol succinate - I sent 90 day supply to mail order. She does notice improvement in her palpitations at the full 25 mg dose. She will follow up with me in September.

## 2023-08-30 NOTE — PROGRESS NOTES
ADULT ANNUAL [facility]    NAME: Dallas Rome  AGE: 62 y.o. 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 She had a colonoscopy in , the report indicates that she is due for a repeat colonoscopy in 2 years (). However, the patient reports that she called their office last year and they indicated that the colonoscopy is not due until . Message sent to Care Gap team to contact [facility] office to verify. Currently follows with [facility], scheduled for repeat labs and f/u on     Repeat labs ordered for patient to complete 1 week prior to 6-month follow-up (repeat a1c and bmp)    Patient is due for a mammogram in early December, order placed. Patient is also due for a bone density screening due to hysterectomy history, order placed.     Tdap vaccine administered as patient is due and is expecting a new grandchild in March    6-month follow-up    Problem List Items Addressed This Visit        Other    Prediabetes    Relevant Orders    Hemoglobin E6U    Basic metabolic panel   Other Visit Diagnoses     Annual physical exam    -  Primary    Symptomatic premature menopause        Relevant Orders    DXA bone density spine hip and pelvis    Need for hepatitis C screening test        Relevant Orders    Hepatitis C Antibody    Screening for HIV (human immunodeficiency virus)        Relevant Orders    HIV 1/2 AG/AB w Reflex [facility] for 2 yr old and above    Encounter for administration of vaccine        Relevant Orders    TDAP VACCINE GREATER THAN OR EQUAL TO 6YO IM    Encounter for screening mammogram for breast cancer        Relevant Orders    Mammo screening bilateral w 3d & cad          Immunizations and preventive care screenings were discussed with patient today. Appropriate education was printed on patient's after visit summary. Counseling:  Exercise: the importance of regular exercise/physical activity was discussed. Recommend exercise 3-5 times per week for at least 30 minutes. Return in about 6 months (around 2/29/2024) for Recheck. Chief Complaint:     Chief Complaint   Patient presents with   • Physical Exam   • Care Gap Request     Colorectal screening due. Osteoporosis screening due. HIV and Hep C screening due. History of Present Illness:     Adult Annual Physical   Patient here for a comprehensive physical exam. The patient reports no problems. Diet and Physical Activity  Diet/Nutrition: well balanced diet. Exercise: moderate cardiovascular exercise. Depression Screening  PHQ-2/9 Depression Screening         General Health  Sleep: sleeps well. Hearing: normal - bilateral.  Vision: no vision problems. Dental: regular dental visits. /GYN Health  Patient is: postmenopausal     Review of Systems:     Review of Systems   Constitutional: Negative. HENT: Negative. Eyes: Negative. Respiratory: Negative. Cardiovascular: Negative. Negative for chest pain, palpitations and leg swelling. Gastrointestinal: Negative. Endocrine: Negative. Genitourinary: Negative. Musculoskeletal: Negative. Skin: Negative. Allergic/Immunologic: Negative. Neurological: Negative. Hematological: Negative. Psychiatric/Behavioral: Negative.        Past Medical History:     Past Medical History:   Diagnosis Date   • Anxiety    • Depression    • Hypothyroidism     was previously on medication   • Prediabetes    • Syncope    • Urge incontinence    • Vitamin D deficiency       Past Surgical History:     Past Surgical History:   Procedure Laterality Date   • CHOLECYSTECTOMY     • HYSTERECTOMY     • TONSILLECTOMY     • ULNAR TUNNEL RELEASE        Social History:     Social History     Socioeconomic History   • Marital status: /Civil Union     Spouse name: Cosmo Ospina   • Number of children: 6   • Years of education: None   • Highest education level: None   Occupational History   • None   Tobacco Use   • Smoking status: Never   • Smokeless tobacco: Never   Vaping Use   • Vaping Use: Never used   Substance and Sexual Activity   • Alcohol use: Yes     Comment: social   • Drug use: Never   • Sexual activity: None     Comment: Defer   Other Topics Concern   • None   Social History Narrative   • None     Social Determinants of Health     Financial Resource Strain: Not on file   Food Insecurity: Not on file   Transportation Needs: Not on file   Physical Activity: Not on file   Stress: Not on file   Social Connections: Not on file   Intimate Partner Violence: Not on file   Housing Stability: Not on file      Family History:     Family History   Problem Relation Age of Onset   • Diabetes Mother    • Hypertension Mother    • Coronary artery disease Father         Dad  with MI 52's.  at age 66   • Diabetes Father    • Basal cell carcinoma Father    • Parkinsonism Father    • Hypertension Brother    • Transient ischemic attack Brother    • Heart disease Brother    • Breast cancer Paternal Aunt       Current Medications:     Current Outpatient Medications   Medication Sig Dispense Refill   • clobetasol (TEMOVATE) 0.05 % cream Apply 1 application topically     • Cyanocobalamin (VITAMIN B 12 PO) Take by mouth     • EPINEPHrine (EPIPEN) 0.3 mg/0.3 mL SOAJ INJECT 0.3 MLS (0.3 MG TOTAL) INTO THE MUSCLE ONCE FOR 1 DOSE.      • escitalopram (LEXAPRO) 20 mg tablet Take 1 tablet (20 mg total) by mouth daily 90 tablet 3   • metFORMIN (GLUCOPHAGE-XR) 500 mg 24 hr tablet Take 1 tablet (500 mg total) by mouth daily with breakfast 90 tablet 1   • metoprolol succinate (TOPROL-XL) 25 mg 24 hr tablet Take 1 tablet (25 mg total) by mouth daily 90 tablet 3   • oxybutynin (DITROPAN-XL) 5 mg 24 hr tablet Take 1 tablet (5 mg total) by mouth daily 90 tablet 3   • rosuvastatin (CRESTOR) 5 mg tablet Take 1 tablet (5 mg total) by mouth daily 30 tablet 11     No current facility-administered medications for this visit. Allergies: Allergies   Allergen Reactions   • Bee Venom Swelling and Vomiting   • Metformin Diarrhea   • Other Blisters     Holter Monitor Stickers - Other reaction(s): very sensitive (even to sensitive stickers) - causes welts       Physical Exam:     /62 (BP Location: Left arm, Patient Position: Sitting, Cuff Size: Standard)   Pulse 60   Ht 5' 6.5" (1.689 m)   Wt 76.9 kg (169 lb 8.5 oz)   SpO2 97%   BMI 26.95 kg/m²     Physical Exam  Vitals and nursing note reviewed. Constitutional:       Appearance: Normal appearance. HENT:      Head: Normocephalic. Right Ear: Tympanic membrane normal.      Left Ear: Tympanic membrane normal.      Nose: Nose normal. No congestion. Mouth/Throat:      Mouth: Mucous membranes are moist.      Pharynx: Oropharynx is clear. No oropharyngeal exudate. Eyes:      Extraocular Movements: Extraocular movements intact. Conjunctiva/sclera: Conjunctivae normal.      Pupils: Pupils are equal, round, and reactive to light. Cardiovascular:      Rate and Rhythm: Normal rate and regular rhythm. Pulses: Normal pulses. Heart sounds: Normal heart sounds. No murmur heard. Pulmonary:      Effort: Pulmonary effort is normal. No respiratory distress. Breath sounds: Normal breath sounds. No wheezing. Abdominal:      General: Bowel sounds are normal.      Palpations: Abdomen is soft. Tenderness: There is no abdominal tenderness. Musculoskeletal:         General: No swelling, tenderness, deformity or signs of injury. Normal range of motion. Cervical back: Normal range of motion and neck supple. No tenderness. Right lower leg: No edema. Left lower leg: No edema. Lymphadenopathy:      Cervical: No cervical adenopathy.    Skin:     General: Skin is warm and dry. Capillary Refill: Capillary refill takes less than 2 seconds. Findings: No rash. Neurological:      General: No focal deficit present. Mental Status: She is alert and oriented to person, place, and time. Cranial Nerves: No cranial nerve deficit. Sensory: No sensory deficit. Motor: No weakness.       Coordination: Coordination normal.      Gait: Gait normal.      Deep Tendon Reflexes: Reflexes normal.   Psychiatric:         Mood and Affect: Mood normal.         Behavior: Behavior normal.          Meghan Humphries, [facility]

## 2023-08-31 ENCOUNTER — OFFICE VISIT (OUTPATIENT)
Age: 58
End: 2023-08-31

## 2023-08-31 ENCOUNTER — TELEPHONE (OUTPATIENT)
Dept: ADMINISTRATIVE | Facility: OTHER | Age: 58
End: 2023-08-31

## 2023-08-31 VITALS
HEART RATE: 60 BPM | BODY MASS INDEX: 26.61 KG/M2 | OXYGEN SATURATION: 97 % | DIASTOLIC BLOOD PRESSURE: 62 MMHG | SYSTOLIC BLOOD PRESSURE: 100 MMHG | WEIGHT: 169.53 LBS | HEIGHT: 67 IN

## 2023-08-31 DIAGNOSIS — E28.310 SYMPTOMATIC PREMATURE MENOPAUSE: ICD-10-CM

## 2023-08-31 DIAGNOSIS — R73.03 PREDIABETES: ICD-10-CM

## 2023-08-31 DIAGNOSIS — Z11.59 NEED FOR HEPATITIS C SCREENING TEST: ICD-10-CM

## 2023-08-31 DIAGNOSIS — Z00.00 ANNUAL PHYSICAL EXAM: Primary | ICD-10-CM

## 2023-08-31 DIAGNOSIS — Z23 ENCOUNTER FOR ADMINISTRATION OF VACCINE: ICD-10-CM

## 2023-08-31 DIAGNOSIS — Z11.4 SCREENING FOR HIV (HUMAN IMMUNODEFICIENCY VIRUS): ICD-10-CM

## 2023-08-31 DIAGNOSIS — Z12.31 ENCOUNTER FOR SCREENING MAMMOGRAM FOR BREAST CANCER: ICD-10-CM

## 2023-08-31 NOTE — TELEPHONE ENCOUNTER
----- Message from Lorrie Cook, [facility] sent at 8/31/2023  8:31 AM EDT -----  Regarding: Care Gap Request  08/31/23 8:31 AM    Hello, our patient attached above has had CRC: Colonoscopy completed/performed. Please assist in updating the patient chart by making an External outreach to [address] [place] [address] ([facility]) facility located in [place], [place]. The date of service is 2020. The report we have on file indicates that the patient is due for a repeat colonoscopy in 2022. However, the patient reports that she was verbally informed by their office that she is not due until 2025. Can we please contact their office to verify. Thank you very much!     Thank you,  ISIAH Vega  [facility] Laws

## 2023-08-31 NOTE — PATIENT INSTRUCTIONS
[address] [facility] [place] [phone]     Complete lab work approx 1 week prior to follow-up

## 2023-09-07 ENCOUNTER — TELEPHONE (OUTPATIENT)
Dept: ADMINISTRATIVE | Facility: OTHER | Age: 58
End: 2023-09-07

## 2023-09-07 NOTE — TELEPHONE ENCOUNTER
Upon review of the In Basket request we were able to locate, review, and update the patient chart as requested for CRC: Colonoscopy. Any additional questions or concerns should be emailed to the Practice Liaisons via the appropriate education email address, please do not reply via In Basket.     Thank you  Nell Silva

## 2023-09-07 NOTE — TELEPHONE ENCOUNTER
----- Message from Adam Holland, [address] sent at 8/31/2023  8:31 AM EDT -----  Regarding: Care Gap Request  08/31/23 8:31 AM    Hello, our patient attached above has had CRC: Colonoscopy completed/performed. Please assist in updating the patient chart by making an External outreach to [address] [place] [address] ([facility]) facility located in [place], [place]. The date of service is 2020. The report we have on file indicates that the patient is due for a repeat colonoscopy in 2022. However, the patient reports that she was verbally informed by their office that she is not due until 2025. Can we please contact their office to verify. Thank you very much!     Thank you,  ISIAH Del Rosario  [facility]

## 2023-09-11 ENCOUNTER — APPOINTMENT (OUTPATIENT)
Age: 58
End: 2023-09-11
Payer: COMMERCIAL

## 2023-09-11 DIAGNOSIS — E78.2 MIXED HYPERLIPIDEMIA: ICD-10-CM

## 2023-09-11 LAB
ALBUMIN SERPL BCP-MCNC: 4.1 G/DL (ref 3.5–5)
ALP SERPL-CCNC: 46 U/L (ref 34–104)
ALT SERPL W P-5'-P-CCNC: 19 U/L (ref 7–52)
AST SERPL W P-5'-P-CCNC: 24 U/L (ref 13–39)
BILIRUB DIRECT SERPL-MCNC: 0.1 MG/DL (ref 0–0.2)
BILIRUB SERPL-MCNC: 0.65 MG/DL (ref 0.2–1)
CHOLEST SERPL-MCNC: 146 MG/DL
HDLC SERPL-MCNC: 55 MG/DL
LDLC SERPL CALC-MCNC: 76 MG/DL (ref 0–100)
LDLC SERPL DIRECT ASSAY-MCNC: 85 MG/DL (ref 0–100)
NONHDLC SERPL-MCNC: 91 MG/DL
PROT SERPL-MCNC: 6.6 G/DL (ref 6.4–8.4)
TRIGL SERPL-MCNC: 75 MG/DL

## 2023-09-11 PROCEDURE — 80076 HEPATIC FUNCTION PANEL: CPT

## 2023-09-11 PROCEDURE — 36415 COLL VENOUS BLD VENIPUNCTURE: CPT

## 2023-09-11 PROCEDURE — 80061 LIPID PANEL: CPT

## 2023-09-11 PROCEDURE — 83721 ASSAY OF BLOOD LIPOPROTEIN: CPT

## 2023-09-22 ENCOUNTER — OFFICE VISIT (OUTPATIENT)
Dept: CARDIOLOGY CLINIC | Facility: CLINIC | Age: 58
End: 2023-09-22
Payer: COMMERCIAL

## 2023-09-22 VITALS
BODY MASS INDEX: 27.8 KG/M2 | HEART RATE: 53 BPM | TEMPERATURE: 98 F | HEIGHT: 66 IN | WEIGHT: 173 LBS | OXYGEN SATURATION: 98 % | SYSTOLIC BLOOD PRESSURE: 118 MMHG | DIASTOLIC BLOOD PRESSURE: 60 MMHG

## 2023-09-22 DIAGNOSIS — I34.0 MILD MITRAL REGURGITATION: ICD-10-CM

## 2023-09-22 DIAGNOSIS — K21.9 GASTROESOPHAGEAL REFLUX DISEASE WITHOUT ESOPHAGITIS: ICD-10-CM

## 2023-09-22 DIAGNOSIS — R94.31 SHORTENED PR INTERVAL: ICD-10-CM

## 2023-09-22 DIAGNOSIS — E78.00 PURE HYPERCHOLESTEROLEMIA: ICD-10-CM

## 2023-09-22 DIAGNOSIS — R73.03 PREDIABETES: ICD-10-CM

## 2023-09-22 DIAGNOSIS — R00.0 RAPID HEART RATE: Primary | ICD-10-CM

## 2023-09-22 DIAGNOSIS — Z82.49 FAMILY HISTORY OF CORONARY ARTERY DISEASE: ICD-10-CM

## 2023-09-22 DIAGNOSIS — R94.31 ABNORMAL ECG DURING EXERCISE STRESS TEST: ICD-10-CM

## 2023-09-22 DIAGNOSIS — R07.2 PRECORDIAL PAIN: ICD-10-CM

## 2023-09-22 PROCEDURE — 99214 OFFICE O/P EST MOD 30 MIN: CPT | Performed by: NURSE PRACTITIONER

## 2023-09-22 RX ORDER — MULTIVITAMIN WITH IRON
250 TABLET ORAL
Qty: 90 TABLET | Refills: 3 | Status: SHIPPED | OUTPATIENT
Start: 2023-09-22

## 2023-09-22 RX ORDER — OMEPRAZOLE 40 MG/1
40 CAPSULE, DELAYED RELEASE ORAL DAILY
Qty: 90 CAPSULE | Refills: 0 | Status: SHIPPED | OUTPATIENT
Start: 2023-09-22

## 2023-09-22 NOTE — PROGRESS NOTES
Cardiology Follow Up    Alvin Reji  1965  [id number]  [facility]  [address]  [address] [facility] [place]  [phone]    Edilma Cavanaugh presents for follow up of palpitations, chest pressure, family history of CAD. To review test results. 1. Rapid heart rate  Assessment & Plan:  Patient notes intermittent rapid heart rates. ECG with short CO interval (no delta waves). Prior history of syncope x 2.   Echocardiogram and stress echo 7/28/2023 are unrevealing. 14 day ZIO monitor 6/15-7/5/2023 show 29 runs of SVT, longest 10.7 seconds. 13 triggers and 10 diary entries primarily correlate with NSR, with one episode of fluttering/racing correlating with the longest SVT episode. She has since been started on metoprolol succinate 25 mg daily with some relief. Will trial metoprolol succinate 12.5 mg BID. Add magnesium supplement today. Consider sleep study - will discuss again at follow up. Orders:  -     Magnesium 250 MG TABS; Take 1 tablet (250 mg total) by mouth daily at bedtime  -     Magnesium    2. Precordial pain  Assessment & Plan:  Intermittent episodes of sharp centrally located chest pain. Has had episode followed by chest pressure and dyspnea on exertion. False positive exercise stress test by report of prior cardiologist.   Echocardiogram 11/2014 unremarkable with normal EF and mild mitral and tricuspid regurgitation. Family history of coronary artery disease in father (MI and CABG) and brother (details pending). Updated echo and stress echo 7/28/2023 show no concerning findings. ASCVD risk is < 10%. Will trial PPI therapy for possible GERD component. Also recommend sleep study -which we will discuss at follow up. Will monitor. 3. Mild mitral regurgitation  Assessment & Plan:  Mild mitral and tricuspid regurgitation on echocardiogram 11/5/2014. No significant valvular abnormality.    Updated echo and stress echo 7/28/2023 show mild MR with no concerning valvular abnormality. 4. Shortened IA interval  Assessment & Plan:  Short IA interval noted on ECG 6/16/2023 (92 ms) without delta waves. Prior episodes of syncope x 2. Non ischemic stress echocardiogram 2014 and 7/28/2023. Exercise stress test by cardiology report was positive for ischemia prompting stress echocardiogram (presumed false positive as imaging showed no ischemia). Testing as outlined under rapid heart rates. 5. Abnormal ECG during exercise stress test  Assessment & Plan:  False positive exercise stress test documented in cardiology note 12/3/2014. Stress echocardiogram was done for follow up 12/2014 and showed no evidence of ischemia. Repeat stress echo 7/28/2023 shows good exercise tolerance with no echo evidence of ischemia      6. Pure hypercholesterolemia  Assessment & Plan:  Lipid panel 9/11/2023: C 146. T 75. H 55. L 76. Currently on rosuvastatin 5 mg daily. Goal LDL < 100  Continue current regimen and recheck lipid panel 9/24.      7. Family history of coronary artery disease  Assessment & Plan:  Father with MI in either 42's or 52's and eventual CABG. Recommend aggressive risk factor modification. Ideally would like to see LDL < 100. Reviewed importance of BP/glucose control as well. 8. Gastroesophageal reflux disease without esophagitis  Assessment & Plan: Will trial course of PPI therapy with omeprazole 40 mg daily and assess response. Orders:  -     omeprazole (PriLOSEC) 40 MG capsule; Take 1 capsule (40 mg total) by mouth daily    9. Prediabetes  Assessment & Plan:  HA1C 5.8 08/23/2022 on metformin. Managed by PCP         HPI  Junior Khalil has a past medical history of palpitations, pre-diabetes, hyperlipidemia, GERD, and family history of premature CAD in her father and brother. History of incomplete RBBB on ECG. Her father had an MI in his 38-51's and eventual CABG.  Brother had heart disease and a stroke diagnosed in his 42's. She was initially referred to cardiology in 2014 for cardiac work up due to episodes of chest tightness and heart pounding. She met with [facility], Dr. Chelsea Wall in December 2014. She had undergone an exercise stress test 11/4/2014 with positive ECG changes with stress. Echocardiogram 11/5/14 showed LVEF 60% with no significant abnormality. Holter monitor showed rare PAC's/PVC's. A stress echo was ordered and completed 12/31/14 which showed normal exercise tolerance with no echo evidence of ischemia at 85% MPHR. She was referred back to cardiology for continued symptoms and met in consultation with Dr. Ananda Wood at our office 6/16/2023. She reported episodes of chest pressure/tightness associated with heart pounding. She also reported a sharp mid sternal pain. ECG showed SR with IRBBB and a short AK interval with no delta waves noted. She reported a history of syncope x 2. Updated echocardiogram, stress echo, and 14 day ZIO monitor were ordered. ZIO 6/21-7/5/2023 showed 29 runs of SVT, longest 10.7 seconds with avg HR 80 bpm. Rare PAC's/PVC's. 13 triggers and 10 diary entries with most symptoms correlating with NSR, fluttering/racing episode correlated with the longest run of SVT. Echo 7/28/2023 showed LVEF 60-65% with mild MR and no concerning findings. Stress echo with Kevin protocol showed no echo evidence of ischemia at 10.1 mets and 93% MPHR.    9/22/2023: Sariah Mcknight presents for routine follow up. We reviewed the results of her testing in detail. She has been taking a 25 mg metoprolol succinate in the morning, which did initially help her palpitations. She has noticed breakthrough symptoms of short bursts of heart racing which lasts less than 1 minute as well as "flip flop" sensation. She has sporadic mid sternal chest pressure which occurs at random, not with exertion. She does admit to acid reflux symptoms which are worse with certain foods.  No shortness of breath, activity intolerance, edema. She completed lab work for her PCP 9/11/2023 and results are reviewed with her today. We discussed possible sleep study. She denies symptoms of sleep apnea and defers testing today.      Medical Problems     Problem List     Absence of cervix    Anxiety as acute reaction to exceptional stress    Contact dermatitis and other eczema, due to unspecified cause    Mild tricuspid regurgitation    Family history of basal cell carcinoma    Family history of diabetes mellitus in first degree relative    Family history of coronary artery disease    History of decompression of both ulnar nerves    Incomplete right bundle branch block (RBBB) determined by electrocardiography    Menopause    Abnormal ECG during exercise stress test    Prediabetes    Rapid heart rate    Precordial pain    Shortened PA interval    Mild mitral regurgitation        Past Medical History:   Diagnosis Date   • Anxiety    • Depression    • Hypothyroidism     was previously on medication   • Prediabetes    • Syncope    • Urge incontinence    • Vitamin D deficiency      Social History     Socioeconomic History   • Marital status: /Civil Union     Spouse name: Augusta Davison   • Number of children: 6   • Years of education: Not on file   • Highest education level: Not on file   Occupational History   • Not on file   Tobacco Use   • Smoking status: Never   • Smokeless tobacco: Never   Vaping Use   • Vaping Use: Never used   Substance and Sexual Activity   • Alcohol use: Yes     Comment: social   • Drug use: Never   • Sexual activity: Yes     Comment: Defer   Other Topics Concern   • Not on file   Social History Narrative   • Not on file     Social Determinants of Health     Financial Resource Strain: Not on file   Food Insecurity: Not on file   Transportation Needs: Not on file   Physical Activity: Not on file   Stress: Not on file   Social Connections: Not on file   Intimate Partner Violence: Not on file   Housing Stability: Not on file      Family History   Problem Relation Age of Onset   • Diabetes Mother    • Hypertension Mother    • Coronary artery disease Father         Dad  with MI 52's.  at age 66   • Diabetes Father    • Basal cell carcinoma Father    • Parkinsonism Father    • Hypertension Brother    • Transient ischemic attack Brother    • Heart disease Brother    • Breast cancer Paternal Aunt      Past Surgical History:   Procedure Laterality Date   • CHOLECYSTECTOMY     • HYSTERECTOMY     • TONSILLECTOMY     • ULNAR TUNNEL RELEASE         Current Outpatient Medications:   •  Cyanocobalamin (VITAMIN B 12 PO), Take by mouth, Disp: , Rfl:   •  EPINEPHrine (EPIPEN) 0.3 mg/0.3 mL SOAJ, INJECT 0.3 MLS (0.3 MG TOTAL) INTO THE MUSCLE ONCE FOR 1 DOSE., Disp: , Rfl:   •  escitalopram (LEXAPRO) 20 mg tablet, Take 1 tablet (20 mg total) by mouth daily, Disp: 90 tablet, Rfl: 3  •  Magnesium 250 MG TABS, Take 1 tablet (250 mg total) by mouth daily at bedtime, Disp: 90 tablet, Rfl: 3  •  metFORMIN (GLUCOPHAGE-XR) 500 mg 24 hr tablet, Take 1 tablet (500 mg total) by mouth daily with breakfast, Disp: 90 tablet, Rfl: 1  •  metoprolol succinate (TOPROL-XL) 25 mg 24 hr tablet, Take 1 tablet (25 mg total) by mouth daily, Disp: 90 tablet, Rfl: 3  •  omeprazole (PriLOSEC) 40 MG capsule, Take 1 capsule (40 mg total) by mouth daily, Disp: 90 capsule, Rfl: 0  •  oxybutynin (DITROPAN-XL) 5 mg 24 hr tablet, Take 1 tablet (5 mg total) by mouth daily, Disp: 90 tablet, Rfl: 3  •  clobetasol (TEMOVATE) 0.05 % cream, Apply 1 application.  topically as needed, Disp: , Rfl:   •  rosuvastatin (CRESTOR) 5 mg tablet, Take 1 tablet (5 mg total) by mouth daily, Disp: 30 tablet, Rfl: 11  Allergies   Allergen Reactions   • Bee Venom Swelling and Vomiting   • Metformin Diarrhea   • Other Blisters     Holter Monitor Stickers - Other reaction(s): very sensitive (even to sensitive stickers) - causes welts        Labs:     Chemistry        Component Value Date/Time    K 4.6 06/19/2023 0711     (H) 06/19/2023 0711    CO2 30 06/19/2023 0711    BUN 9 06/19/2023 0711    CREATININE 0.94 06/19/2023 0711        Component Value Date/Time    CALCIUM 9.4 06/19/2023 0711    ALKPHOS 46 09/11/2023 0708    AST 24 09/11/2023 0708    ALT 19 09/11/2023 0708        Lipid panel 9/11/2023: C 146. T 75. H 55. L 76. Stress echo 7/28/2023:  Kevin protocol. 10.1 METs. 93% MPHR. No echo evidence of ischemia. Echocardiogram 7/28/2023:  LVEF 60-65%. Mild MR. LOCKE 6/21-7/5/2023:  Min HR 41. Max HR in . Avg HR 64 bpm.   29 runs of SVT with the longest run lasting 10.7 seconds with an average heart rate of 180 bpm.   Rare PAC's. Rare PVC's. 13 triggers and 10 diary entries. Most symptoms correlated with normal sinus rhythm. Fluttering/racing symptoms correlated with the longest run of SVT and one episode of possible atrial tachycardia and one episode of normal sinus rhythm with transition into possible atrial tachycardia. ECG 6/16/2023: SB. Rate 56 bpm. Short NM interval. Rightward axis. Nonspecific ST/T wave abnormality. Review of Systems   Constitutional: Negative. HENT: Negative. Cardiovascular: Positive for chest pain (mid sternal "pressure" or "tightness") and palpitations. Negative for dyspnea on exertion, irregular heartbeat, leg swelling, near-syncope and orthopnea. Respiratory: Negative for cough and snoring. Endocrine: Negative. Skin: Negative. Musculoskeletal: Negative. Gastrointestinal: Negative. Genitourinary: Negative. Neurological: Negative. Psychiatric/Behavioral: Negative. Vitals:    09/22/23 1340   BP: 118/60   Pulse: (!) 53   Temp: 98 °F (36.7 °C)   SpO2: 98%     Vitals:    09/22/23 1340   Weight: 78.5 kg (173 lb)     Height: 5' 6" (167.6 cm)   Body mass index is 27.92 kg/m². Physical Exam  Vitals and nursing note reviewed. Constitutional:       General: She is not in acute distress.      Appearance: She is well-developed. She is not diaphoretic. HENT:      Head: Normocephalic and atraumatic. Neck:      Thyroid: No thyromegaly. Vascular: No carotid bruit or JVD. Cardiovascular:      Rate and Rhythm: Normal rate and regular rhythm. No extrasystoles are present. Pulses: Intact distal pulses. Radial pulses are 2+ on the right side and 2+ on the left side. Heart sounds: Normal heart sounds, S1 normal and S2 normal. No murmur heard. Comments: No edema  Pulmonary:      Effort: Pulmonary effort is normal.      Breath sounds: Normal breath sounds. Abdominal:      General: There is no distension. Palpations: Abdomen is soft. Tenderness: There is no abdominal tenderness. Musculoskeletal:         General: Normal range of motion. Cervical back: Normal range of motion and neck supple. Lymphadenopathy:      Cervical: No cervical adenopathy. Skin:     General: Skin is warm and dry. Neurological:      Mental Status: She is alert and oriented to person, place, and time. Cranial Nerves: No cranial nerve deficit.    Psychiatric:         Behavior: Behavior normal.

## 2023-09-22 NOTE — PATIENT INSTRUCTIONS
Your echocardiogram shows normal heart structure and function. Your stress echo shows good activity tolerance with no evidence of a blockage. Your heart monitor shows brief episodes of SVT ( a fast rhythm from the top of the heart). Your triggers correlated with sinus rhythm and SVT. We will try splitting your metoprolol - take 1/2 morning and bedtime. Add  magnesium 250-500 mg at bedtime. Watch for diarrhea. Try omeprazole 40 mg at noon to see if this helps with the chest pressure episodes which could be acid reflux.

## 2023-09-24 NOTE — ASSESSMENT & PLAN NOTE
Patient notes intermittent rapid heart rates. ECG with short IL interval (no delta waves). Prior history of syncope x 2.   Echocardiogram and stress echo 7/28/2023 are unrevealing. 14 day ZIO monitor 6/15-7/5/2023 show 29 runs of SVT, longest 10.7 seconds. 13 triggers and 10 diary entries primarily correlate with NSR, with one episode of fluttering/racing correlating with the longest SVT episode. She has since been started on metoprolol succinate 25 mg daily with some relief. Will trial metoprolol succinate 12.5 mg BID. Add magnesium supplement today. Consider sleep study - will discuss again at follow up.

## 2023-09-24 NOTE — ASSESSMENT & PLAN NOTE
Intermittent episodes of sharp centrally located chest pain. Has had episode followed by chest pressure and dyspnea on exertion. False positive exercise stress test by report of prior cardiologist.   Echocardiogram 11/2014 unremarkable with normal EF and mild mitral and tricuspid regurgitation. Family history of coronary artery disease in father (MI and CABG) and brother (details pending). Updated echo and stress echo 7/28/2023 show no concerning findings. ASCVD risk is < 10%. Will trial PPI therapy for possible GERD component. Also recommend sleep study -which we will discuss at follow up. Will monitor.

## 2023-09-24 NOTE — ASSESSMENT & PLAN NOTE
Father with MI in either 42's or 52's and eventual CABG. Recommend aggressive risk factor modification. Ideally would like to see LDL < 100. Reviewed importance of BP/glucose control as well.

## 2023-09-24 NOTE — ASSESSMENT & PLAN NOTE
False positive exercise stress test documented in cardiology note 12/3/2014. Stress echocardiogram was done for follow up 12/2014 and showed no evidence of ischemia.    Repeat stress echo 7/28/2023 shows good exercise tolerance with no echo evidence of ischemia

## 2023-09-24 NOTE — ASSESSMENT & PLAN NOTE
Short IL interval noted on ECG 6/16/2023 (92 ms) without delta waves. Prior episodes of syncope x 2. Non ischemic stress echocardiogram 2014 and 7/28/2023. Exercise stress test by cardiology report was positive for ischemia prompting stress echocardiogram (presumed false positive as imaging showed no ischemia). Testing as outlined under rapid heart rates.

## 2023-09-24 NOTE — ASSESSMENT & PLAN NOTE
Lipid panel 9/11/2023: C 146. T 75. H 55. L 76. Currently on rosuvastatin 5 mg daily. Goal LDL < 100  Continue current regimen and recheck lipid panel 9/24.

## 2023-09-24 NOTE — ASSESSMENT & PLAN NOTE
Mild mitral and tricuspid regurgitation on echocardiogram 11/5/2014. No significant valvular abnormality. Updated echo and stress echo 7/28/2023 show mild MR with no concerning valvular abnormality.

## 2023-09-25 ENCOUNTER — TELEPHONE (OUTPATIENT)
Dept: CARDIOLOGY CLINIC | Facility: CLINIC | Age: 58
End: 2023-09-25

## 2023-09-25 NOTE — TELEPHONE ENCOUNTER
[facility] they have a question about the Magnesium that was ordered.   Please call Pharm [phone]

## 2023-09-25 NOTE — TELEPHONE ENCOUNTER
Spoke with pharm. They were wondering what kind of mag we wanted. Per Tre Sánchez via TT she said mag oxide. Pharm is aware.

## 2023-10-13 ENCOUNTER — OFFICE VISIT (OUTPATIENT)
Age: 58
End: 2023-10-13
Payer: COMMERCIAL

## 2023-10-13 VITALS
DIASTOLIC BLOOD PRESSURE: 61 MMHG | WEIGHT: 176.59 LBS | HEART RATE: 61 BPM | RESPIRATION RATE: 18 BRPM | TEMPERATURE: 97.5 F | BODY MASS INDEX: 27.72 KG/M2 | HEIGHT: 67 IN | SYSTOLIC BLOOD PRESSURE: 110 MMHG | OXYGEN SATURATION: 100 %

## 2023-10-13 DIAGNOSIS — N30.01 ACUTE CYSTITIS WITH HEMATURIA: Primary | ICD-10-CM

## 2023-10-13 LAB
SL AMB  POCT GLUCOSE, UA: ABNORMAL
SL AMB LEUKOCYTE ESTERASE,UA: ABNORMAL
SL AMB POCT BILIRUBIN,UA: ABNORMAL
SL AMB POCT BLOOD,UA: ABNORMAL
SL AMB POCT CLARITY,UA: CLEAR
SL AMB POCT COLOR,UA: YELLOW
SL AMB POCT KETONES,UA: ABNORMAL
SL AMB POCT NITRITE,UA: ABNORMAL
SL AMB POCT PH,UA: 6
SL AMB POCT SPECIFIC GRAVITY,UA: 1
SL AMB POCT URINE PROTEIN: ABNORMAL
SL AMB POCT UROBILINOGEN: 0.2

## 2023-10-13 PROCEDURE — 99213 OFFICE O/P EST LOW 20 MIN: CPT | Performed by: EMERGENCY MEDICINE

## 2023-10-13 PROCEDURE — 81002 URINALYSIS NONAUTO W/O SCOPE: CPT | Performed by: EMERGENCY MEDICINE

## 2023-10-13 PROCEDURE — 87077 CULTURE AEROBIC IDENTIFY: CPT | Performed by: EMERGENCY MEDICINE

## 2023-10-13 PROCEDURE — 87186 SC STD MICRODIL/AGAR DIL: CPT | Performed by: EMERGENCY MEDICINE

## 2023-10-13 PROCEDURE — 87086 URINE CULTURE/COLONY COUNT: CPT | Performed by: EMERGENCY MEDICINE

## 2023-10-13 RX ORDER — NITROFURANTOIN 25; 75 MG/1; MG/1
100 CAPSULE ORAL 2 TIMES DAILY
Qty: 14 CAPSULE | Refills: 0 | Status: SHIPPED | OUTPATIENT
Start: 2023-10-13 | End: 2023-10-20

## 2023-10-13 RX ORDER — PHENAZOPYRIDINE HYDROCHLORIDE 200 MG/1
200 TABLET, FILM COATED ORAL
Qty: 10 TABLET | Refills: 0 | Status: SHIPPED | OUTPATIENT
Start: 2023-10-13

## 2023-10-13 NOTE — PATIENT INSTRUCTIONS
We are sending your urine for culture to determine whether the bacteria causing the infection will be killed with the antibiotic we prescribed for you. We will call you if there is any problem, like resistance, with the antibiotic we have prescribed. You may take over the counter cranberry supplements, such as AZO tablets, which may lessen your UTI symptoms  Increase the amount of fluids you drink daily to flush infection  Follow up with Gynecologist if your symptoms do not resolve after antibiotic  Go to Emergency Room if you develop a fever greater than 101.4 and back pain  Urinary Tract Infection in Women   WHAT YOU NEED TO KNOW:   A urinary tract infection (UTI) is caused by bacteria that get inside your urinary tract. Your urinary tract includes your kidneys, ureters, bladder, and urethra. A UTI is more common in your lower urinary tract, which includes your bladder and urethra. DISCHARGE INSTRUCTIONS:   Return to the emergency department if:   You are urinating very little or not at all. You have a high fever with shaking chills. You have side or back pain that gets worse. Call your doctor if:   You have a fever. You do not feel better after 2 days of taking antibiotics. You have new symptoms, such as blood or pus in your urine. You are vomiting. You have questions or concerns about your condition or care. Medicines:   Antibiotics  treat a bacterial infection. If you have UTIs often (called recurrent UTIs), you may be given antibiotics to take regularly. You will be given directions for when and how to use antibiotics. The goal is to prevent UTIs but not cause antibiotic resistance by using antibiotics too often. Medicines  may be given to decrease pain and burning when you urinate. They will also help decrease the feeling that you need to urinate often. These medicines may make your urine orange or red. Take your medicine as directed.   Contact your healthcare provider if you think your medicine is not helping or if you have side effects. Tell your provider if you are allergic to any medicine. Keep a list of the medicines, vitamins, and herbs you take. Include the amounts, and when and why you take them. Bring the list or the pill bottles to follow-up visits. Carry your medicine list with you in case of an emergency. Follow up with your doctor as directed:  Write down your questions so you remember to ask them during your visits. Prevent another UTI:   Empty your bladder often. Urinate and empty your bladder as soon as you feel the need. Do not hold your urine for long periods of time. Wipe from front to back after you urinate or have a bowel movement. This will help prevent germs from getting into your urinary tract through your urethra. Drink liquids as directed. Ask how much liquid to drink each day and which liquids are best for you. You may need to drink more liquids than usual to help flush out the bacteria. Do not drink alcohol, caffeine, or citrus juices. These can irritate your bladder and increase your symptoms. Your healthcare provider may recommend cranberry juice to help prevent a UTI. Urinate before and after you have sex. This can help flush out bacteria passed during sex. Do not douche or use feminine deodorants. These can change the chemical balance in your vagina. Change sanitary pads or tampons often. This will help prevent germs from getting into your urinary tract. Talk to your healthcare provider about your birth control method. You may need to change your method if it is increasing your risk for UTIs. Wear cotton underwear and clothes that are loose. Tight pants and nylon underwear can trap moisture and cause bacteria to grow. Vaginal estrogen may be recommended. This medicine helps prevent UTIs in women who have gone through menopause or are in debbie-menopause. Do pelvic muscle exercises often.   Pelvic muscle exercises may help you start and stop urinating. Strong pelvic muscles may help you empty your bladder easier. Squeeze these muscles tightly for 5 seconds like you are trying to hold back urine. Then relax for 5 seconds. Gradually work up to squeezing for 10 seconds. Do 3 sets of 15 repetitions a day, or as directed. © Copyright Fernandez Eisenberg 2023 Information is for End User's use only and may not be sold, redistributed or otherwise used for commercial purposes. The above information is an  only. It is not intended as medical advice for individual conditions or treatments. Talk to your doctor, nurse or pharmacist before following any medical regimen to see if it is safe and effective for you.

## 2023-10-13 NOTE — PROGRESS NOTES
[facility]        NAME: Rhys Marks is a 62 y.o. female  : 1965    MRN: [id number]  DATE: 2023  TIME: 1:27 PM    Assessment and Plan   Acute cystitis with hematuria [N30.01]  1. Acute cystitis with hematuria  POCT urine dip    Urine culture    phenazopyridine (PYRIDIUM) 200 mg tablet    nitrofurantoin (MACROBID) 100 mg capsule            Patient Instructions     Patient Instructions   We are sending your urine for culture to determine whether the bacteria causing the infection will be killed with the antibiotic we prescribed for you. We will call you if there is any problem, like resistance, with the antibiotic we have prescribed. You may take over the counter cranberry supplements, such as AZO tablets, which may lessen your UTI symptoms  Increase the amount of fluids you drink daily to flush infection  Follow up with Gynecologist if your symptoms do not resolve after antibiotic  Go to Emergency Room if you develop a fever greater than 101.4 and back pain  Urinary Tract Infection in Women   WHAT YOU NEED TO KNOW:   A urinary tract infection (UTI) is caused by bacteria that get inside your urinary tract. Your urinary tract includes your kidneys, ureters, bladder, and urethra. A UTI is more common in your lower urinary tract, which includes your bladder and urethra. DISCHARGE INSTRUCTIONS:   Return to the emergency department if:   You are urinating very little or not at all. You have a high fever with shaking chills. You have side or back pain that gets worse. Call your doctor if:   You have a fever. You do not feel better after 2 days of taking antibiotics. You have new symptoms, such as blood or pus in your urine. You are vomiting. You have questions or concerns about your condition or care. Medicines:   Antibiotics  treat a bacterial infection. If you have UTIs often (called recurrent UTIs), you may be given antibiotics to take regularly.  You will be given directions for when and how to use antibiotics. The goal is to prevent UTIs but not cause antibiotic resistance by using antibiotics too often. Medicines  may be given to decrease pain and burning when you urinate. They will also help decrease the feeling that you need to urinate often. These medicines may make your urine orange or red. Take your medicine as directed. Contact your healthcare provider if you think your medicine is not helping or if you have side effects. Tell your provider if you are allergic to any medicine. Keep a list of the medicines, vitamins, and herbs you take. Include the amounts, and when and why you take them. Bring the list or the pill bottles to follow-up visits. Carry your medicine list with you in case of an emergency. Follow up with your doctor as directed:  Write down your questions so you remember to ask them during your visits. Prevent another UTI:   Empty your bladder often. Urinate and empty your bladder as soon as you feel the need. Do not hold your urine for long periods of time. Wipe from front to back after you urinate or have a bowel movement. This will help prevent germs from getting into your urinary tract through your urethra. Drink liquids as directed. Ask how much liquid to drink each day and which liquids are best for you. You may need to drink more liquids than usual to help flush out the bacteria. Do not drink alcohol, caffeine, or citrus juices. These can irritate your bladder and increase your symptoms. Your healthcare provider may recommend cranberry juice to help prevent a UTI. Urinate before and after you have sex. This can help flush out bacteria passed during sex. Do not douche or use feminine deodorants. These can change the chemical balance in your vagina. Change sanitary pads or tampons often. This will help prevent germs from getting into your urinary tract.     Talk to your healthcare provider about your birth control method. You may need to change your method if it is increasing your risk for UTIs. Wear cotton underwear and clothes that are loose. Tight pants and nylon underwear can trap moisture and cause bacteria to grow. Vaginal estrogen may be recommended. This medicine helps prevent UTIs in women who have gone through menopause or are in debbie-menopause. Do pelvic muscle exercises often. Pelvic muscle exercises may help you start and stop urinating. Strong pelvic muscles may help you empty your bladder easier. Squeeze these muscles tightly for 5 seconds like you are trying to hold back urine. Then relax for 5 seconds. Gradually work up to squeezing for 10 seconds. Do 3 sets of 15 repetitions a day, or as directed. © Copyright Elisha Goltz 2023 Information is for End User's use only and may not be sold, redistributed or otherwise used for commercial purposes. The above information is an  only. It is not intended as medical advice for individual conditions or treatments. Talk to your doctor, nurse or pharmacist before following any medical regimen to see if it is safe and effective for you. Follow up with PCP in 3-5 days. Proceed to  ER if symptoms worsen. Chief Complaint     Chief Complaint   Patient presents with    Possible UTI     Burning with urination, blood in urine, pelvic pressure since Tuesday. History of Present Illness       Patient complains of burning on urination with increased frequency for the past 3 days. Difficulty Urinating   This is a new problem. The current episode started in the past 7 days. The problem occurs every urination. The problem has been gradually worsening. The quality of the pain is described as burning. The pain is at a severity of 6/10. There has been no fever. She is Sexually active. There is No history of pyelonephritis. Associated symptoms include frequency, hematuria, hesitancy and urgency.  Pertinent negatives include no chills or flank pain. Review of Systems   Review of Systems   Constitutional:  Negative for chills and fever. Genitourinary:  Positive for dysuria, frequency, hematuria, hesitancy and urgency. Negative for difficulty urinating and flank pain. Musculoskeletal:  Negative for back pain. Current Medications       Current Outpatient Medications:     Cyanocobalamin (VITAMIN B 12 PO), Take by mouth, Disp: , Rfl:     EPINEPHrine (EPIPEN) 0.3 mg/0.3 mL SOAJ, INJECT 0.3 MLS (0.3 MG TOTAL) INTO THE MUSCLE ONCE FOR 1 DOSE., Disp: , Rfl:     escitalopram (LEXAPRO) 20 mg tablet, Take 1 tablet (20 mg total) by mouth daily, Disp: 90 tablet, Rfl: 3    Magnesium 250 MG TABS, Take 1 tablet (250 mg total) by mouth daily at bedtime, Disp: 90 tablet, Rfl: 3    metFORMIN (GLUCOPHAGE-XR) 500 mg 24 hr tablet, Take 1 tablet (500 mg total) by mouth daily with breakfast, Disp: 90 tablet, Rfl: 1    metoprolol succinate (TOPROL-XL) 25 mg 24 hr tablet, Take 1 tablet (25 mg total) by mouth daily, Disp: 90 tablet, Rfl: 3    nitrofurantoin (MACROBID) 100 mg capsule, Take 1 capsule (100 mg total) by mouth 2 (two) times a day for 7 days, Disp: 14 capsule, Rfl: 0    omeprazole (PriLOSEC) 40 MG capsule, Take 1 capsule (40 mg total) by mouth daily, Disp: 90 capsule, Rfl: 0    oxybutynin (DITROPAN-XL) 5 mg 24 hr tablet, Take 1 tablet (5 mg total) by mouth daily, Disp: 90 tablet, Rfl: 3    phenazopyridine (PYRIDIUM) 200 mg tablet, Take 1 tablet (200 mg total) by mouth 3 (three) times a day with meals, Disp: 10 tablet, Rfl: 0    rosuvastatin (CRESTOR) 5 mg tablet, Take 1 tablet (5 mg total) by mouth daily, Disp: 30 tablet, Rfl: 11    clobetasol (TEMOVATE) 0.05 % cream, Apply 1 application.  topically as needed, Disp: , Rfl:     Current Allergies     Allergies as of 10/13/2023 - Reviewed 10/13/2023   Allergen Reaction Noted    Bee venom Swelling and Vomiting 01/12/2018    Metformin Diarrhea 08/25/2021    Other Blisters 07/10/2015            The following portions of the patient's history were reviewed and updated as appropriate: allergies, current medications, past family history, past medical history, past social history, past surgical history and problem list.     Past Medical History:   Diagnosis Date    Anxiety     Depression     Hypothyroidism     was previously on medication    Prediabetes     Syncope     Urge incontinence     Vitamin D deficiency        Past Surgical History:   Procedure Laterality Date    CHOLECYSTECTOMY      HYSTERECTOMY      TONSILLECTOMY      ULNAR TUNNEL RELEASE         Family History   Problem Relation Age of Onset    Diabetes Mother     Hypertension Mother     Coronary artery disease Father         Dad  with MI 52's.  at age 66    Diabetes Father     Basal cell carcinoma Father     Parkinsonism Father     Hypertension Brother     Transient ischemic attack Brother     Heart disease Brother     Breast cancer Paternal Aunt          Medications have been verified. Objective   /61 (BP Location: Left arm, Patient Position: Sitting, Cuff Size: Standard)   Pulse 61   Temp 97.5 °F (36.4 °C) (Tympanic)   Resp 18   Ht 5' 7" (1.702 m)   Wt 80.1 kg (176 lb 9.4 oz)   SpO2 100%   BMI 27.66 kg/m²        Physical Exam     Physical Exam  Vitals and nursing note reviewed. Constitutional:       General: She is not in acute distress. Appearance: She is well-developed. Abdominal:      General: Bowel sounds are normal. There is no distension. Palpations: Abdomen is soft. There is no mass. Tenderness: There is abdominal tenderness. There is no guarding or rebound. Comments: Tender suprapubic region, no rebound or guarding, no CVA tenderness. Skin:     General: Skin is warm and dry. Findings: No rash. Neurological:      Mental Status: She is alert and oriented to person, place, and time.    Psychiatric:         Mood and Affect: Mood normal.         Behavior: Behavior normal.         Thought Content:  Thought content normal.         Judgment: Judgment normal.

## 2023-10-15 LAB — BACTERIA UR CULT: ABNORMAL

## 2023-10-19 DIAGNOSIS — N30.01 ACUTE CYSTITIS WITH HEMATURIA: Primary | ICD-10-CM

## 2023-10-19 RX ORDER — SULFAMETHOXAZOLE AND TRIMETHOPRIM 800; 160 MG/1; MG/1
1 TABLET ORAL 2 TIMES DAILY
Qty: 6 TABLET | Refills: 0 | Status: SHIPPED | OUTPATIENT
Start: 2023-10-19 | End: 2023-10-22

## 2023-12-06 DIAGNOSIS — R73.03 PREDIABETES: ICD-10-CM

## 2023-12-07 RX ORDER — METFORMIN HYDROCHLORIDE 500 MG/1
500 TABLET, EXTENDED RELEASE ORAL
Qty: 90 TABLET | Refills: 3 | Status: SHIPPED | OUTPATIENT
Start: 2023-12-07

## 2024-01-09 DIAGNOSIS — K21.9 GASTROESOPHAGEAL REFLUX DISEASE WITHOUT ESOPHAGITIS: ICD-10-CM

## 2024-01-10 RX ORDER — OMEPRAZOLE 40 MG/1
CAPSULE, DELAYED RELEASE ORAL
Qty: 90 CAPSULE | Refills: 0 | OUTPATIENT
Start: 2024-01-10

## 2024-01-19 ENCOUNTER — APPOINTMENT (OUTPATIENT)
Age: 59
End: 2024-01-19
Payer: COMMERCIAL

## 2024-01-19 DIAGNOSIS — R73.03 PREDIABETES: ICD-10-CM

## 2024-01-19 DIAGNOSIS — Z11.4 SCREENING FOR HIV (HUMAN IMMUNODEFICIENCY VIRUS): ICD-10-CM

## 2024-01-19 DIAGNOSIS — Z11.59 NEED FOR HEPATITIS C SCREENING TEST: ICD-10-CM

## 2024-01-19 LAB
ANION GAP SERPL CALCULATED.3IONS-SCNC: 8 MMOL/L
BUN SERPL-MCNC: 12 MG/DL (ref 5–25)
CALCIUM SERPL-MCNC: 9.9 MG/DL (ref 8.4–10.2)
CHLORIDE SERPL-SCNC: 106 MMOL/L (ref 96–108)
CO2 SERPL-SCNC: 29 MMOL/L (ref 21–32)
CREAT SERPL-MCNC: 0.91 MG/DL (ref 0.6–1.3)
EST. AVERAGE GLUCOSE BLD GHB EST-MCNC: 128 MG/DL
GFR SERPL CREATININE-BSD FRML MDRD: 69 ML/MIN/1.73SQ M
GLUCOSE P FAST SERPL-MCNC: 104 MG/DL (ref 65–99)
HBA1C MFR BLD: 6.1 %
HCV AB SER QL: NORMAL
HIV 1+2 AB+HIV1 P24 AG SERPL QL IA: NORMAL
HIV 2 AB SERPL QL IA: NORMAL
HIV1 AB SERPL QL IA: NORMAL
HIV1 P24 AG SERPL QL IA: NORMAL
POTASSIUM SERPL-SCNC: 5 MMOL/L (ref 3.5–5.3)
SODIUM SERPL-SCNC: 143 MMOL/L (ref 135–147)

## 2024-01-19 PROCEDURE — 87389 HIV-1 AG W/HIV-1&-2 AB AG IA: CPT

## 2024-01-19 PROCEDURE — 86803 HEPATITIS C AB TEST: CPT

## 2024-01-19 PROCEDURE — 80048 BASIC METABOLIC PNL TOTAL CA: CPT

## 2024-01-19 PROCEDURE — 36415 COLL VENOUS BLD VENIPUNCTURE: CPT

## 2024-01-19 PROCEDURE — 83036 HEMOGLOBIN GLYCOSYLATED A1C: CPT

## 2024-01-26 ENCOUNTER — OFFICE VISIT (OUTPATIENT)
Dept: CARDIOLOGY CLINIC | Facility: CLINIC | Age: 59
End: 2024-01-26
Payer: COMMERCIAL

## 2024-01-26 VITALS
HEART RATE: 62 BPM | HEIGHT: 67 IN | TEMPERATURE: 97.7 F | DIASTOLIC BLOOD PRESSURE: 62 MMHG | OXYGEN SATURATION: 94 % | SYSTOLIC BLOOD PRESSURE: 104 MMHG | BODY MASS INDEX: 26.46 KG/M2 | WEIGHT: 168.6 LBS

## 2024-01-26 DIAGNOSIS — I45.10 INCOMPLETE RIGHT BUNDLE BRANCH BLOCK (RBBB) DETERMINED BY ELECTROCARDIOGRAPHY: ICD-10-CM

## 2024-01-26 DIAGNOSIS — R00.0 RAPID HEART RATE: Primary | ICD-10-CM

## 2024-01-26 DIAGNOSIS — E78.00 PURE HYPERCHOLESTEROLEMIA: ICD-10-CM

## 2024-01-26 DIAGNOSIS — I47.10 SVT (SUPRAVENTRICULAR TACHYCARDIA): ICD-10-CM

## 2024-01-26 DIAGNOSIS — K21.9 GASTROESOPHAGEAL REFLUX DISEASE WITHOUT ESOPHAGITIS: ICD-10-CM

## 2024-01-26 DIAGNOSIS — G47.30 SLEEP DISORDER BREATHING: ICD-10-CM

## 2024-01-26 DIAGNOSIS — Z82.49 FAMILY HISTORY OF CORONARY ARTERY DISEASE: ICD-10-CM

## 2024-01-26 DIAGNOSIS — R94.31 SHORTENED PR INTERVAL: ICD-10-CM

## 2024-01-26 DIAGNOSIS — R94.31 ABNORMAL ECG DURING EXERCISE STRESS TEST: ICD-10-CM

## 2024-01-26 DIAGNOSIS — R07.2 PRECORDIAL PAIN: ICD-10-CM

## 2024-01-26 PROCEDURE — 99214 OFFICE O/P EST MOD 30 MIN: CPT | Performed by: NURSE PRACTITIONER

## 2024-01-26 RX ORDER — METOPROLOL SUCCINATE 25 MG/1
25 TABLET, EXTENDED RELEASE ORAL 2 TIMES DAILY
Qty: 180 TABLET | Refills: 3 | Status: SHIPPED | OUTPATIENT
Start: 2024-01-26

## 2024-01-26 RX ORDER — MULTIVITAMIN WITH IRON
500 TABLET ORAL
Qty: 180 TABLET | Refills: 3 | Status: SHIPPED | OUTPATIENT
Start: 2024-01-26

## 2024-01-26 RX ORDER — OMEPRAZOLE 40 MG/1
40 CAPSULE, DELAYED RELEASE ORAL DAILY
Qty: 90 CAPSULE | Refills: 0 | Status: SHIPPED | OUTPATIENT
Start: 2024-01-26

## 2024-01-26 NOTE — ASSESSMENT & PLAN NOTE
Father with MI in either 40's or 50's and eventual CABG.   Recommend aggressive risk factor modification.   Ideally would like to see LDL < 100.  Reviewed importance of BP/glucose control as well.

## 2024-01-26 NOTE — PATIENT INSTRUCTIONS
Please check magnesium level in the next couple weeks.  Increase supplement to 500 mg daily - if you get loose stools back off.  If needed you can increase metoprolol to a full 25 mg twice daily. Just monitor blood pressure.  Referral for sleep medicine consult to evaluate for sleep apnea.  Contact me with any new or worsening symptoms.

## 2024-01-26 NOTE — ASSESSMENT & PLAN NOTE
Incomplete RBBB noted as far back as 2014.  Underwent exercise stress test with presumed false positive response to exercise resulting in stress echocardiogram which showed no ischemia.  Echocardiogram 12/2014 with normal EF and mild mitral and tricuspid regurgitation.   Short ID interval on ECG 6/16/2023.   See full plan under rapid heart rates and precordial pain.

## 2024-01-26 NOTE — ASSESSMENT & PLAN NOTE
Patient notes intermittent rapid heart rates.   ECG with short NM interval (no delta waves).  Prior history of syncope x 2.   Echocardiogram and stress echo 7/28/2023 are unrevealing.  14 day ZIO monitor 6/15-7/5/2023 show 29 runs of SVT, longest 10.7 seconds. 13 triggers and 10 diary entries primarily correlate with NSR, with one episode of fluttering/racing correlating with the longest SVT episode.  She has since been started on metoprolol succinate 25 mg daily with some relief.  Will trial increase to 25 mg BID.  Increase magnesium to 500 mg daily.  Will arrange for JOSE testing.

## 2024-01-26 NOTE — ASSESSMENT & PLAN NOTE
Intermittent episodes of sharp centrally located chest pain.   Has had episode followed by chest pressure and dyspnea on exertion.   False positive exercise stress test by report of prior cardiologist.   Echocardiogram 11/2014 unremarkable with normal EF and mild mitral and tricuspid regurgitation.   Family history of coronary artery disease in father (MI and CABG) and brother (details pending).  Updated echo and stress echo 7/28/2023 show no concerning findings.  ASCVD risk is < 10%.   Symptoms are resolved with addition of PPI.

## 2024-01-26 NOTE — PROGRESS NOTES
Cardiology Follow Up    Rachel Novak  1965  [id number]  [facility] [place] [address]  [address]  [facility] [address] [phone]    Rachel presents for follow up of palpitations, chest pressure, family history of CAD.     1. Rapid heart rate  Assessment & Plan:  Patient notes intermittent rapid heart rates.   ECG with short IN interval (no delta waves).  Prior history of syncope x 2.   Echocardiogram and stress echo 7/28/2023 are unrevealing.  14 day ZIO monitor 6/15-7/5/2023 show 29 runs of SVT, longest 10.7 seconds. 13 triggers and 10 diary entries primarily correlate with NSR, with one episode of fluttering/racing correlating with the longest SVT episode.  She has since been started on metoprolol succinate 25 mg daily with some relief.  Will trial increase to 25 mg BID.  Increase magnesium to 500 mg daily.  Will arrange for JOSE testing.    Orders:  -     Magnesium 250 MG TABS; Take 2 tablets (500 mg total) by mouth daily at bedtime    2. SVT (supraventricular tachycardia)  Assessment & Plan:  See discussion under rapid heart rate    Orders:  -     metoprolol succinate (TOPROL-XL) 25 mg 24 hr tablet; Take 1 tablet (25 mg total) by mouth 2 (two) times a day    3. Abnormal ECG during exercise stress test  Assessment & Plan:  False positive exercise stress test documented in cardiology note 12/3/2014.   Stress echocardiogram was done for follow up 12/2014 and showed no evidence of ischemia.   Repeat stress echo 7/28/2023 shows good exercise tolerance with no echo evidence of ischemia      4. Shortened IN interval  Assessment & Plan:  Short IN interval noted on ECG 6/16/2023 (92 ms) without delta waves.   Prior episodes of syncope x 2.   Non ischemic stress echocardiogram 2014 and 7/28/2023.  Exercise stress test by cardiology report was positive for ischemia prompting stress echocardiogram (presumed false positive as imaging showed no  ischemia).  Testing as outlined under rapid heart rates.       5. Incomplete right bundle branch block (RBBB) determined by electrocardiography  Assessment & Plan:  Incomplete RBBB noted as far back as 2014.  Underwent exercise stress test with presumed false positive response to exercise resulting in stress echocardiogram which showed no ischemia.  Echocardiogram 12/2014 with normal EF and mild mitral and tricuspid regurgitation.   Short MD interval on ECG 6/16/2023.   See full plan under rapid heart rates and precordial pain.      6. Family history of coronary artery disease  Assessment & Plan:  Father with MI in either 40's or 50's and eventual CABG.   Recommend aggressive risk factor modification.   Ideally would like to see LDL < 100.  Reviewed importance of BP/glucose control as well.      7. Precordial pain  Assessment & Plan:  Intermittent episodes of sharp centrally located chest pain.   Has had episode followed by chest pressure and dyspnea on exertion.   False positive exercise stress test by report of prior cardiologist.   Echocardiogram 11/2014 unremarkable with normal EF and mild mitral and tricuspid regurgitation.   Family history of coronary artery disease in father (MI and CABG) and brother (details pending).  Updated echo and stress echo 7/28/2023 show no concerning findings.  ASCVD risk is < 10%.   Symptoms are resolved with addition of PPI.      8. Pure hypercholesterolemia  Assessment & Plan:  Lipid panel 9/11/2023: C 146. T 75. H 55. L 76.  Currently on rosuvastatin 5 mg daily.  Goal LDL < 100  Continue current regimen and recheck lipid panel 9/24.      9. Gastroesophageal reflux disease without esophagitis  Assessment & Plan:  Resolved with omeprazole 40 mg daily.  May use PPI PRN    Orders:  -     omeprazole (PriLOSEC) 40 MG capsule; Take 1 capsule (40 mg total) by mouth daily    10. Sleep disorder breathing  Assessment & Plan:  + snoring, fatigue.  Referral to sleep medicine to evaluate for  JOSE    Orders:  -     Ambulatory Referral to Sleep Medicine; Future       HPI  Rachel has a past medical history of palpitations, pre-diabetes, hyperlipidemia, GERD, and family history of premature CAD in her father and brother. History of incomplete RBBB on ECG.     Her father had an MI in his 40-50's and eventual CABG. Brother had heart disease and a stroke diagnosed in his 40's.     She was initially referred to cardiology in 2014 for cardiac work up due to episodes of chest tightness and heart pounding. She met with [facility], Dr. Wiggins in December 2014. She had undergone an exercise stress test 11/4/2014 with positive ECG changes with stress. Echocardiogram 11/5/14 showed LVEF 60% with no significant abnormality. Holter monitor showed rare PAC's/PVC's. A stress echo was ordered and completed 12/31/14 which showed normal exercise tolerance with no echo evidence of ischemia at 85% MPHR.      She was referred back to cardiology for continued symptoms and met in consultation with Dr. Peña at our office 6/16/2023. She reported episodes of chest pressure/tightness associated with heart pounding. She also reported a sharp mid sternal pain. ECG showed SR with IRBBB and a short NE interval with no delta waves noted. She reported a history of syncope x 2. Updated echocardiogram, stress echo, and 14 day ZIO monitor were ordered.     ZIO 6/21-7/5/2023 showed 29 runs of SVT, longest 10.7 seconds with avg HR 80 bpm. Rare PAC's/PVC's. 13 triggers and 10 diary entries with most symptoms correlating with NSR, fluttering/racing episode correlated with the longest run of SVT.      Echo 7/28/2023 showed LVEF 60-65% with mild MR and no concerning findings.   Stress echo with Kevin protocol showed no echo evidence of ischemia at 10.1 mets and 93% MPHR.    She followed up on 9/22/2023 at which time we reviewed her test results. She did note improvement in her palpitations with addition of metoprolol with occasional  breakthrough bursts of heart racing which last less than 1 minute. She reported sporadic mid sternal chest pressure, not exertional as well as acid reflux with certain foods. Magnesium supplement was added and metoprolol changed to 12.5 mg BID. Omeprazole 40 mg daily for 3 months was added.    1/26/2024: Rachel presents for routine follow up. She is looking forward to the birth of a new grandson next month. She reports complete resolution of her GERD symptoms and sharp central chest pain with 3 months of omeprazole. Now only taking as needed with good relief. She continues with intermittent palpitations. She is taking a magnesium supplement and 1/2 metoprolol twice daily. No lightheadedness or dizziness. She reports good activity tolerance with no exertional chest discomfort. She admits to snoring and fatigue. She has not undergone sleep study.    Medical Problems       Problem List       Rapid heart rate    Precordial pain    Abnormal ECG during exercise stress test    Family history of coronary artery disease    Pure hypercholesterolemia    Absence of cervix    Anxiety as acute reaction to exceptional stress    Contact dermatitis and other eczema, due to unspecified cause    Mild tricuspid regurgitation    Family history of basal cell carcinoma    Family history of diabetes mellitus in first degree relative    History of decompression of both ulnar nerves    Incomplete right bundle branch block (RBBB) determined by electrocardiography    Menopause    Prediabetes    Shortened MA interval    Mild mitral regurgitation    Gastroesophageal reflux disease without esophagitis    Sleep disorder breathing    SVT (supraventricular tachycardia)        Past Medical History:   Diagnosis Date    Anxiety     Depression     Hypothyroidism     was previously on medication    Prediabetes     Syncope     Urge incontinence     Vitamin D deficiency      Social History     Socioeconomic History    Marital status: /Civil Union      Spouse name: Lincoln    Number of children: 6    Years of education: Not on file    Highest education level: Not on file   Occupational History    Not on file   Tobacco Use    Smoking status: Never    Smokeless tobacco: Never   Vaping Use    Vaping status: Never Used   Substance and Sexual Activity    Alcohol use: Yes     Comment: social    Drug use: Never    Sexual activity: Yes     Comment: Defer   Other Topics Concern    Not on file   Social History Narrative    Not on file     Social Determinants of Health     Financial Resource Strain: Not on file   Food Insecurity: Not on file   Transportation Needs: Not on file   Physical Activity: Not on file   Stress: Not on file   Social Connections: Not on file   Intimate Partner Violence: Not on file   Housing Stability: Not on file      Family History   Problem Relation Age of Onset    Diabetes Mother     Hypertension Mother     Coronary artery disease Father         Dad  with MI 50's.  at age 78    Diabetes Father     Basal cell carcinoma Father     Parkinsonism Father     Hypertension Brother     Transient ischemic attack Brother     Heart disease Brother     Breast cancer Paternal Aunt      Past Surgical History:   Procedure Laterality Date    CHOLECYSTECTOMY      HYSTERECTOMY      TONSILLECTOMY      ULNAR TUNNEL RELEASE         Current Outpatient Medications:     clobetasol (TEMOVATE) 0.05 % cream, Apply 1 application. topically as needed, Disp: , Rfl:     Cyanocobalamin (VITAMIN B 12 PO), Take by mouth, Disp: , Rfl:     EPINEPHrine (EPIPEN) 0.3 mg/0.3 mL SOAJ, INJECT 0.3 MLS (0.3 MG TOTAL) INTO THE MUSCLE ONCE FOR 1 DOSE., Disp: , Rfl:     escitalopram (LEXAPRO) 20 mg tablet, Take 1 tablet (20 mg total) by mouth daily, Disp: 90 tablet, Rfl: 3    Magnesium 250 MG TABS, Take 2 tablets (500 mg total) by mouth daily at bedtime, Disp: 180 tablet, Rfl: 3    metFORMIN (GLUCOPHAGE-XR) 500 mg 24 hr tablet, TAKE ONE TABLET BY MOUTH EVERY DAY WITH BREAKFAST, Disp: 90  tablet, Rfl: 3    metoprolol succinate (TOPROL-XL) 25 mg 24 hr tablet, Take 1 tablet (25 mg total) by mouth 2 (two) times a day, Disp: 180 tablet, Rfl: 3    omeprazole (PriLOSEC) 40 MG capsule, Take 1 capsule (40 mg total) by mouth daily, Disp: 90 capsule, Rfl: 0    oxybutynin (DITROPAN-XL) 5 mg 24 hr tablet, Take 1 tablet (5 mg total) by mouth daily, Disp: 90 tablet, Rfl: 3    rosuvastatin (CRESTOR) 5 mg tablet, Take 1 tablet (5 mg total) by mouth daily, Disp: 30 tablet, Rfl: 11  Allergies   Allergen Reactions    Bee Venom Swelling and Vomiting    Metformin Diarrhea    Other Blisters     Holter Monitor Stickers - Other reaction(s): very sensitive (even to sensitive stickers) - causes welts        Labs:     Chemistry        Component Value Date/Time    K 5.0 01/19/2024 0706     01/19/2024 0706    CO2 29 01/19/2024 0706    BUN 12 01/19/2024 0706    CREATININE 0.91 01/19/2024 0706        Component Value Date/Time    CALCIUM 9.9 01/19/2024 0706    ALKPHOS 46 09/11/2023 0708    AST 24 09/11/2023 0708    ALT 19 09/11/2023 0708        Lipid panel 9/11/2023: C 146. T 75. H 55. L 76.     Stress echo 7/28/2023:  Kevin protocol. 10.1 METs. 93% MPHR.  No echo evidence of ischemia.     Echocardiogram 7/28/2023:  LVEF 60-65%. Mild MR. LOCKE 6/21-7/5/2023:  Min HR 41. Max HR in . Avg HR 64 bpm.   29 runs of SVT with the longest run lasting 10.7 seconds with an average heart rate of 180 bpm.   Rare PAC's. Rare PVC's.   13 triggers and 10 diary entries.  Most symptoms correlated with normal sinus rhythm.   Fluttering/racing symptoms correlated with the longest run of SVT and one episode of possible atrial tachycardia and one episode of normal sinus rhythm with transition into possible atrial tachycardia.     ECG 6/16/2023: SB. Rate 56 bpm. Short RI interval. Rightward axis. Nonspecific ST/T wave abnormality.     Review of Systems   Constitutional: Positive for malaise/fatigue.   HENT: Negative.     Cardiovascular:   "Positive for palpitations. Negative for chest pain, dyspnea on exertion, irregular heartbeat, leg swelling, near-syncope and orthopnea.   Respiratory:  Positive for snoring. Negative for cough.    Endocrine: Negative.    Skin: Negative.    Musculoskeletal: Negative.    Gastrointestinal: Negative.    Genitourinary: Negative.    Neurological: Negative.    Psychiatric/Behavioral: Negative.         Vitals:    01/26/24 1349   BP: 104/62   Pulse: 62   Temp: 97.7 °F (36.5 °C)   SpO2: 94%     Vitals:    01/26/24 1349   Weight: 76.5 kg (168 lb 9.6 oz)     Height: 5' 7\" (170.2 cm)   Body mass index is 26.41 kg/m².    Physical Exam  Vitals and nursing note reviewed.   Constitutional:       General: She is not in acute distress.     Appearance: She is well-developed. She is not diaphoretic.   HENT:      Head: Normocephalic and atraumatic.   Neck:      Thyroid: No thyromegaly.      Vascular: No carotid bruit or JVD.   Cardiovascular:      Rate and Rhythm: Normal rate and regular rhythm. No extrasystoles are present.     Pulses: Intact distal pulses.           Radial pulses are 2+ on the right side and 2+ on the left side.      Heart sounds: Normal heart sounds, S1 normal and S2 normal. No murmur heard.     Comments: No edema  Pulmonary:      Effort: Pulmonary effort is normal.      Breath sounds: Normal breath sounds.   Abdominal:      General: There is no distension.      Palpations: Abdomen is soft.      Tenderness: There is no abdominal tenderness.   Musculoskeletal:         General: Normal range of motion.      Cervical back: Normal range of motion and neck supple.   Lymphadenopathy:      Cervical: No cervical adenopathy.   Skin:     General: Skin is warm and dry.   Neurological:      Mental Status: She is alert and oriented to person, place, and time.      Cranial Nerves: No cranial nerve deficit.   Psychiatric:         Behavior: Behavior normal.                "

## 2024-01-26 NOTE — ASSESSMENT & PLAN NOTE
Short [place] interval noted on ECG 6/16/2023 (92 ms) without delta waves.   Prior episodes of syncope x 2.   Non ischemic stress echocardiogram 2014 and 7/28/2023.  Exercise stress test by cardiology report was positive for ischemia prompting stress echocardiogram (presumed false positive as imaging showed no ischemia).  Testing as outlined under rapid heart rates.

## 2024-02-01 ENCOUNTER — APPOINTMENT (OUTPATIENT)
Age: 59
End: 2024-02-01
Payer: COMMERCIAL

## 2024-02-01 LAB — MAGNESIUM SERPL-MCNC: 2.3 MG/DL (ref 1.9–2.7)

## 2024-03-26 ENCOUNTER — OFFICE VISIT (OUTPATIENT)
Age: 59
End: 2024-03-26
Payer: COMMERCIAL

## 2024-03-26 VITALS
OXYGEN SATURATION: 98 % | RESPIRATION RATE: 18 BRPM | BODY MASS INDEX: 26.85 KG/M2 | WEIGHT: 171.08 LBS | DIASTOLIC BLOOD PRESSURE: 68 MMHG | HEART RATE: 52 BPM | HEIGHT: 67 IN | SYSTOLIC BLOOD PRESSURE: 116 MMHG

## 2024-03-26 DIAGNOSIS — Z82.71: ICD-10-CM

## 2024-03-26 DIAGNOSIS — Z12.11 SCREENING FOR COLON CANCER: ICD-10-CM

## 2024-03-26 DIAGNOSIS — R73.03 PREDIABETES: Primary | ICD-10-CM

## 2024-03-26 PROCEDURE — 99213 OFFICE O/P EST LOW 20 MIN: CPT | Performed by: NURSE PRACTITIONER

## 2024-03-26 RX ORDER — PNV NO.95/FERROUS FUM/FOLIC AC 28MG-0.8MG
TABLET ORAL
COMMUNITY
Start: 2024-01-26

## 2024-03-26 NOTE — PROGRESS NOTES
"Name: Rahcel Novak      : 1965      MRN: [id number]  Encounter Provider: ISIAH Perez  Encounter Date: 3/26/2024   Encounter department: [facility]    Assessment & Plan     1. Prediabetes  Assessment & Plan:  Patient's most recent A1c from January was 6.1%, increased since last checked.  Patient has a family history of diabetes and therefore is on low-dose metformin for diabetes prevention.  Recommended a low carbohydrate diet and aerobic exercise.  Patient plans on completing her \"caring starts with you\" lab work in April which contains an A1c.  Will reach out to patient when A1c is drawn and finalized.      2. Screening for colon cancer  -     Ambulatory referral to Gastroenterology; Future    3. Family history of polycystic kidney disease, autosomal recessive  Assessment & Plan:  Patient had a recent death in the family.  Patient's 1-month-old granddaughter passed away from complications associated with autosomal recessive polycystic kidney disease.  The patient is expressing concerns today regarding the diagnosis and the family: \" Is there anything I or my family should be doing?\".  Patient's GFR's unchanged since .  Her blood pressure is well-controlled.  She denies lower back pain or abdominal distention/pain.    Will refer patient to genetics program to discuss topic further.    Orders:  -     Ambulatory Referral to Genetics; Future           Subjective      Patient presents today to discuss her recent lab work.  In particular, she would like to discuss her recent A1c result.  Her A1c increased slightly to 6.1%.  Patient with a family history of diabetes in both her mother and father.  Patient states that her dietary habits and exercise regimen are relatively unchanged since January.      Review of Systems   Constitutional: Negative.    HENT: Negative.     Eyes: Negative.    Respiratory: Negative.     Cardiovascular: Negative.    Gastrointestinal: " "Negative.  Negative for abdominal pain.   Endocrine: Negative.  Negative for polyuria.   Genitourinary: Negative.  Negative for decreased urine volume, difficulty urinating, dysuria and hematuria.   Musculoskeletal: Negative.  Negative for back pain.   Skin: Negative.    Allergic/Immunologic: Negative.    Neurological: Negative.    Hematological: Negative.    Psychiatric/Behavioral: Negative.         Current Outpatient Medications on File Prior to Visit   Medication Sig   • clobetasol (TEMOVATE) 0.05 % cream Apply 1 application. topically as needed   • Cyanocobalamin (VITAMIN B 12 PO) Take by mouth   • EPINEPHrine (EPIPEN) 0.3 mg/0.3 mL SOAJ INJECT 0.3 MLS (0.3 MG TOTAL) INTO THE MUSCLE ONCE FOR 1 DOSE.   • escitalopram (LEXAPRO) 20 mg tablet Take 1 tablet (20 mg total) by mouth daily   • Magnesium 250 MG TABS Take 2 tablets (500 mg total) by mouth daily at bedtime   • metFORMIN (GLUCOPHAGE-XR) 500 mg 24 hr tablet TAKE ONE TABLET BY MOUTH EVERY DAY WITH BREAKFAST   • metoprolol succinate (TOPROL-XL) 25 mg 24 hr tablet Take 1 tablet (25 mg total) by mouth 2 (two) times a day   • omeprazole (PriLOSEC) 40 MG capsule Take 1 capsule (40 mg total) by mouth daily   • oxybutynin (DITROPAN-XL) 5 mg 24 hr tablet Take 1 tablet (5 mg total) by mouth daily   • rosuvastatin (CRESTOR) 5 mg tablet Take 1 tablet (5 mg total) by mouth daily   • Magnesium Oxide -Mg Supplement 250 MG TABS  (Patient not taking: Reported on 3/26/2024)       Objective     /68 (BP Location: Left arm, Patient Position: Sitting, Cuff Size: Large)   Pulse (!) 52   Resp 18   Ht 5' 7\" (1.702 m)   Wt 77.6 kg (171 lb 1.2 oz)   SpO2 98%   BMI 26.79 kg/m²     Physical Exam  Constitutional:       General: She is not in acute distress.     Appearance: Normal appearance.   HENT:      Nose: Nose normal.   Eyes:      Extraocular Movements: Extraocular movements intact.      Conjunctiva/sclera: Conjunctivae normal.   Cardiovascular:      Rate and Rhythm: " Normal rate and regular rhythm.      Heart sounds: Normal heart sounds.   Pulmonary:      Effort: Pulmonary effort is normal. No respiratory distress.      Breath sounds: Normal breath sounds. No wheezing.   Abdominal:      General: Abdomen is flat. There is no distension.      Tenderness: There is no abdominal tenderness. There is no right CVA tenderness or left CVA tenderness.   Musculoskeletal:         General: No swelling or deformity. Normal range of motion.      Cervical back: Normal range of motion.   Skin:     Findings: No erythema or rash.   Neurological:      Mental Status: She is alert and oriented to person, place, and time.   Psychiatric:         Mood and Affect: Mood normal.         Behavior: Behavior normal.       ISIAH Perez

## 2024-03-27 NOTE — ASSESSMENT & PLAN NOTE
"Patient's most recent A1c from January was 6.1%, increased since last checked.  Patient has a family history of diabetes and therefore is on low-dose metformin for diabetes prevention.  Recommended a low carbohydrate diet and aerobic exercise.  Patient plans on completing her \"caring starts with you\" lab work in April which contains an A1c.  Will reach out to patient when A1c is drawn and finalized.  "

## 2024-03-27 NOTE — ASSESSMENT & PLAN NOTE
"Patient had a recent death in the family.  Patient's 1-month-old granddaughter passed away from complications associated with autosomal recessive polycystic kidney disease.  The patient is expressing concerns today regarding the diagnosis and the family: \" Is there anything I or my family should be doing?\".  Patient's GFR's unchanged since 2020.  Her blood pressure is well-controlled.  She denies lower back pain or abdominal distention/pain.    Will refer patient to genetics program to discuss topic further.  " .                                                          [facility] INFECTIOUS DISEASE CONSULT NOTE    Rachna Vasquez Patient Status:  Inpatient    1935 MRN [id number]   Location [facility] [address] Attending Subuhi F Humera, DO   Hosp Day # 2 PCP Anastasia Reinoso MD       Reason for Consultation:  Fatigue and fever    History of Present Illness:  Rachna Vasquez is a a(n) 85 year old female.  She feels a bit better today.  Reviewed CT findings.  She had noted some dark urine previously.  CT showing liver abscess most likely.  4 cm.  Difficult to get to for drainage.  Also with what appears to be chronic diverticulitis with colovesicular fistula.  Patient denies any known history of air/bubbles in her urine.  No debris in her urine.    History:  Past Medical History:   Diagnosis Date   • Atrial fibrillation (CMS/HCC)    • Dyslipidemia    • Essential hypertension    • GI bleed    • Hypothyroidism      Past Surgical History:   Procedure Laterality Date   • Cardiac device check - in clinic       dual-chamber [employer] pacemaker 18   • Cholecystectomy     • Total knee replacement       Family History   Problem Relation Age of Onset   • Leukemia Mother          age 45   • Other Father         brain hemmorhage  age 50   • Coronary Artery Disease Neg Hx          premature CAD.      reports that she has quit smoking. She smoked 0.00 packs per day. She has never used smokeless tobacco. She reports previous alcohol use. She reports that she does not use drugs.    Allergies:  ALLERGIES:   Allergen Reactions   • Amiodarone Hcl SHORTNESS OF BREATH     Oral     • Adhesive   (Environmental) Other (See Comments)     Blisters sometimes   • Aspirin Other (See Comments)     Internal bleeding   • Hydralazine Hcl Other (See Comments)     Unknown   • Iodinated Diagnostic Agents Other (See Comments)     Unknown   • Iodine   (Environmental Or Med) Other (See Comments)     External     • Latex  Other (See Comments)     Unknown       Medications:  Current Facility-Administered Medications   Medication Dose Route Frequency Provider Last Rate Last Admin   • cloNIDine (CATAPRES) tablet 0.1 mg  0.1 mg Oral BID PRN Tommie Doyle MD       • losartan (COZAAR) tablet 25 mg  25 mg Oral Nightly Karina Reu, CNS       • [START ON 12/31/2020] losartan (COZAAR) tablet 50 mg  50 mg Oral Daily Karina Reu, CNS       • cefepime (MAXIPIME) 1,000 mg in sodium chloride 0.9 % 100 mL IVPB  1,000 mg Intravenous 3 times per day Sam Anderson MD       • metroNIDAZOLE (FLAGYL) IVPB 500 mg  500 mg Intravenous 3 times per day Sam Anderson MD       • albuterol (VENTOLIN) nebulizer 2.5 mg  2.5 mg Nebulization BID Resp Henrietta Doty MD       • sodium chloride 0.9% infusion   Intravenous Continuous Subuhi F Humera,  mL/hr at 12/30/20 0417 New Bag at 12/30/20 0417   • flecainide (TAMBOCOR) tablet 150 mg  150 mg Oral BID Codey Armstrong MD   150 mg at 12/30/20 0758   • levothyroxine (SYNTHROID, LEVOTHROID) tablet 75 mcg  75 mcg Oral QAM AC Codey Armstrong MD   75 mcg at 12/30/20 0514   • metoPROLOL succinate (TOPROL-XL) ER tablet 50 mg  50 mg Oral Daily Codey Armstrong MD   50 mg at 12/30/20 0758   • sodium chloride 0.9 % flush bag 25 mL  25 mL Intravenous PRN Codey Armstrong MD       • sodium chloride (PF) 0.9 % injection 2 mL  2 mL Intracatheter 2 times per day Codey Armstrong MD   2 mL at 12/29/20 0815   • sodium chloride 0.9 % flush bag 25 mL  25 mL Intravenous PRN Codey Armstrong MD       • sodium chloride (NORMAL SALINE) 0.9 % bolus 500 mL  500 mL Intravenous PRN Codey Armstrong MD       • enoxaparin (LOVENOX) injection 40 mg  40 mg Subcutaneous Daily Codey Armstrong MD   40 mg at 12/30/20 0758   • Potassium Standard Replacement Protocol   Does not apply See Admin Instructions Codey Armstrong MD       • Magnesium Standard Replacement Protocol   Does not apply See Admin Instructions Codey Armstrong MD       • ondansetron (ZOFRAN) injection 4 mg   4 mg Intravenous BID PRN Cdoey Armstrong MD       • prochlorperazine (COMPAZINE) tablet 5 mg  5 mg Oral Q4H PRN Codey Armstrong MD       • azithromycin (ZITHROMAX) tablet 250 mg  250 mg Oral Daily Codey Armstrong MD   250 mg at 12/30/20 0820   • albuterol (VENTOLIN) nebulizer 2.5 mg  2.5 mg Nebulization Q4H Resp PRN Codey Armstrong MD       • VANCOMYCIN - PHARMACIST MONITORED   Does not apply See Admin Instructions Sam Anderson MD       • vancomycin (VANCOCIN) 750 mg in sodium chloride 0.9 % 250 mL IVPB  750 mg Intravenous 2 times per day Sam Anderson .7 mL/hr at 12/30/20 0804 750 mg at 12/30/20 0804        Review of Systems:    .   Physical Exam:    General: No acute distress. Alert and oriented x 3.  No accessory muscle breathing.  Respiratory rate 16.  Comfortable  Vital signs: Blood pressure (!) 188/79, pulse 60, temperature 97.5 °F (36.4 °C), temperature source Oral, resp. rate 16, height 5' 2\" (1.575 m), weight 80.1 kg (176 lb 9.4 oz), SpO2 96 %.  HEENT: Moist mucous membranes. Extraocular muscles are intact.  Neck: No lymphadenopathy.  No JVD. No carotid bruits.  Respiratory: Clear to auscultation bilaterally.  No wheezes. No rhonchi.  Cardiovascular: S1, S2.  Regular rate and rhythm.  No murmurs.  Abdomen: Soft, nontender, nondistended.  Positive bowel sounds.  Musculoskeletal: Full range of motion of all extremities.  No swelling noted.  She showed me areas in the left inner thigh where the boil had been.  There is no crepitus.  No induration or anything to drain.  No fluctuance.  Integument: No lesions. No erythema. No open wounds.    Laboratory Data:  Recent Labs   Lab 12/30/20  0411 12/29/20  0553 12/29/20  0429 12/28/20  0932   WBC 18.3* 22.8*  --  14.7*   HGB 11.4* 11.0*  --  13.8   HCT 34.6* 32.9*  --  42.3   MCV 85.2 84.4  --  86.0    162  --  216   SODIUM 136  --  136 125*   POTASSIUM 4.6  --  3.9 4.2   CHLORIDE 108*  --  105 92*   CO2 24  --  25 26   BUN 18  --  17 13   CREATININE 0.53   --  0.72 0.78   GLUCOSE 207*  --  194* 159*   CALCIUM 8.4  --  8.5 9.0   AST 39*  --  41* 44*   GPT 44  --  37 25     Microbiologic Data:   (this admission)  Microbiology Results     None           Imaging:  Imaging results reviewed     Impression:  Patient Active Problem List   Diagnosis   • Hypertension   • Presence of cardiac pacemaker   • SSS (sick sinus syndrome) (CMS/HCC)   • Hypercholesterolemia   • History of GI bleed   • Paroxysmal atrial fibrillation (CMS/[facility])   • Encounter for monitoring flecainide therapy   • Hypothyroidism   • Dyspnea   • Fatigue   • Acute non-recurrent pansinusitis   • Acute bronchitis   • CAP (community acquired pneumonia)   • Respiratory failure (CMS/[facility])       ASSESSMENT:  #Fatigue.  Rule out bacteremia.  With fever and nonproductive cough.  Covid initially negative.  CT chest unremarkable other than lesion on the liver.  Follow-up CT abdomen pelvis shows probable liver abscess with abdominal of her.  4 cm.  Also with chronic diverticulitis with apparent colovesicular fistula with contrast into the bladder.    Initial work-up included:  Rule out influenza.  Rule out other atypical pneumonias including Legionella and mycoplasma.  Has some boils in her left inner thigh which ruptured spontaneously approximately 3 days prior to her presentation.  Initial concern for the possibility of bacteremia staph aureus.  Blood cultures negative    #Underlying COPD.  Diagnosed 5 years ago.  No follow-up.  History of smoking.        PLAN:  Transition to cefepime, Flagyl, vancomycin.  IR input appreciated.  Difficult area to tap.  General surgery evaluation for colovesicular fistula.  Could be a portal of entry for her liver abscess.  Discussed that she will need prolonged course of IV antibiotic therapy.  Probably at least 4 to 6 weeks with follow-up CT findings.  Discussed with Dr Humera.  Patient had previous IV antibiotic therapy for what sounds like cholangitis with a retained gallstone.  She  went to rehab for this.  We will consider her outpatient options..  .    Sam Anderson MD  [facility], [facility]  [phone]  12/30/2020  2:12 PM

## 2024-04-09 ENCOUNTER — TELEPHONE (OUTPATIENT)
Age: 59
End: 2024-04-09

## 2024-04-09 DIAGNOSIS — Z80.51 FAMILY HISTORY OF RENAL CANCER: Primary | ICD-10-CM

## 2024-04-10 ENCOUNTER — TELEPHONE (OUTPATIENT)
Age: 59
End: 2024-04-10

## 2024-04-10 NOTE — TELEPHONE ENCOUNTER
----- Message from ISIAH Perez sent at 4/10/2024  3:27 PM EDT -----  Regarding: FW: Gastroenterology   Contact: [phone]  Please assist by faxing the order. Thank you!  ----- Message -----  From: Ashley Weil  Sent: 4/10/2024   9:47 AM EDT  To: ISIAH Perez  Subject: FW: Gastroenterology                             Please place order.   ----- Message -----  From: Dottie Valente RN  Sent: 4/10/2024   9:40 AM EDT  To: [facility]  Subject: FW: Gastroenterology                             Please advise, I do not see a new order for colonoscopy in chart.   ----- Message -----  From: Rachel Novak  Sent: 4/10/2024   9:00 AM EDT  To: [facility]  Subject: Gastroenterology                                 Hi,    I received a call from Gastroenterology yesterday. They just need my colonoscopy order faxed to them . [phone]      Thank you!

## 2024-04-12 NOTE — PROGRESS NOTES
Received fax from GI,order for colonoscopy had no patient identifier on it. Re faxed to [phone] with patient name and  on cover sheet and confirmation received.

## 2024-04-16 ENCOUNTER — HOSPITAL ENCOUNTER (EMERGENCY)
Facility: HOSPITAL | Age: 59
Discharge: HOME | End: 2024-04-16
Attending: EMERGENCY MEDICINE
Payer: COMMERCIAL

## 2024-04-16 ENCOUNTER — OFFICE VISIT (OUTPATIENT)
Age: 59
End: 2024-04-16
Payer: COMMERCIAL

## 2024-04-16 ENCOUNTER — APPOINTMENT (EMERGENCY)
Dept: RADIOLOGY | Facility: HOSPITAL | Age: 59
End: 2024-04-16
Payer: COMMERCIAL

## 2024-04-16 VITALS
HEART RATE: 53 BPM | OXYGEN SATURATION: 96 % | TEMPERATURE: 98.3 F | RESPIRATION RATE: 18 BRPM | SYSTOLIC BLOOD PRESSURE: 112 MMHG | DIASTOLIC BLOOD PRESSURE: 59 MMHG

## 2024-04-16 VITALS
BODY MASS INDEX: 26.3 KG/M2 | RESPIRATION RATE: 18 BRPM | TEMPERATURE: 97.8 F | HEIGHT: 67 IN | WEIGHT: 167.55 LBS | DIASTOLIC BLOOD PRESSURE: 77 MMHG | OXYGEN SATURATION: 98 % | SYSTOLIC BLOOD PRESSURE: 136 MMHG | HEART RATE: 56 BPM

## 2024-04-16 DIAGNOSIS — R07.9 CHEST PAIN, UNSPECIFIED TYPE: Primary | ICD-10-CM

## 2024-04-16 DIAGNOSIS — R07.9 CHEST PAIN WITH LOW RISK OF ACUTE CORONARY SYNDROME: Primary | ICD-10-CM

## 2024-04-16 DIAGNOSIS — I45.10 INCOMPLETE RIGHT BUNDLE BRANCH BLOCK (RBBB): ICD-10-CM

## 2024-04-16 DIAGNOSIS — R00.1 SINUS BRADYCARDIA: ICD-10-CM

## 2024-04-16 LAB
ANION GAP SERPL CALCULATED.3IONS-SCNC: 4 MMOL/L (ref 4–13)
ATRIAL RATE: 51 BPM
ATRIAL RATE: 55 BPM
ATRIAL RATE: 57 BPM
ATRIAL RATE: 57 BPM
BASOPHILS # BLD AUTO: 0.04 THOUSANDS/ÂΜL (ref 0–0.1)
BASOPHILS NFR BLD AUTO: 1 % (ref 0–1)
BNP SERPL-MCNC: 43 PG/ML (ref 0–100)
BUN SERPL-MCNC: 14 MG/DL (ref 5–25)
CALCIUM SERPL-MCNC: 9.2 MG/DL (ref 8.4–10.2)
CARDIAC TROPONIN I PNL SERPL HS: <2 NG/L
CARDIAC TROPONIN I PNL SERPL HS: <2 NG/L
CHLORIDE SERPL-SCNC: 107 MMOL/L (ref 96–108)
CO2 SERPL-SCNC: 26 MMOL/L (ref 21–32)
CREAT SERPL-MCNC: 0.73 MG/DL (ref 0.6–1.3)
EOSINOPHIL # BLD AUTO: 0.42 THOUSAND/ÂΜL (ref 0–0.61)
EOSINOPHIL NFR BLD AUTO: 6 % (ref 0–6)
ERYTHROCYTE [DISTWIDTH] IN BLOOD BY AUTOMATED COUNT: 11.7 % (ref 11.6–15.1)
GFR SERPL CREATININE-BSD FRML MDRD: 90 ML/MIN/1.73SQ M
GLUCOSE SERPL-MCNC: 95 MG/DL (ref 65–140)
HCT VFR BLD AUTO: 33.6 % (ref 34.8–46.1)
HGB BLD-MCNC: 11.6 G/DL (ref 11.5–15.4)
IMM GRANULOCYTES # BLD AUTO: 0.02 THOUSAND/UL (ref 0–0.2)
IMM GRANULOCYTES NFR BLD AUTO: 0 % (ref 0–2)
LIPASE SERPL-CCNC: 22 U/L (ref 11–82)
LYMPHOCYTES # BLD AUTO: 3.05 THOUSANDS/ÂΜL (ref 0.6–4.47)
LYMPHOCYTES NFR BLD AUTO: 43 % (ref 14–44)
MCH RBC QN AUTO: 32.6 PG (ref 26.8–34.3)
MCHC RBC AUTO-ENTMCNC: 34.5 G/DL (ref 31.4–37.4)
MCV RBC AUTO: 94 FL (ref 82–98)
MONOCYTES # BLD AUTO: 0.62 THOUSAND/ÂΜL (ref 0.17–1.22)
MONOCYTES NFR BLD AUTO: 9 % (ref 4–12)
NEUTROPHILS # BLD AUTO: 2.89 THOUSANDS/ÂΜL (ref 1.85–7.62)
NEUTS SEG NFR BLD AUTO: 41 % (ref 43–75)
NRBC BLD AUTO-RTO: 0 /100 WBCS
P AXIS: 54 DEGREES
P AXIS: 54 DEGREES
P AXIS: 61 DEGREES
P AXIS: 64 DEGREES
PLATELET # BLD AUTO: 200 THOUSANDS/UL (ref 149–390)
PMV BLD AUTO: 9 FL (ref 8.9–12.7)
POTASSIUM SERPL-SCNC: 4 MMOL/L (ref 3.5–5.3)
PR INTERVAL: 118 MS
PR INTERVAL: 124 MS
QRS AXIS: 64 DEGREES
QRS AXIS: 64 DEGREES
QRS AXIS: 76 DEGREES
QRS AXIS: 77 DEGREES
QRSD INTERVAL: 100 MS
QRSD INTERVAL: 94 MS
QRSD INTERVAL: 98 MS
QRSD INTERVAL: 98 MS
QT INTERVAL: 436 MS
QT INTERVAL: 436 MS
QT INTERVAL: 444 MS
QT INTERVAL: 460 MS
QTC INTERVAL: 423 MS
QTC INTERVAL: 424 MS
RBC # BLD AUTO: 3.56 MILLION/UL (ref 3.81–5.12)
SODIUM SERPL-SCNC: 137 MMOL/L (ref 135–147)
T WAVE AXIS: 41 DEGREES
T WAVE AXIS: 55 DEGREES
T WAVE AXIS: 56 DEGREES
T WAVE AXIS: 56 DEGREES
VENTRICULAR RATE: 51 BPM
VENTRICULAR RATE: 55 BPM
VENTRICULAR RATE: 57 BPM
VENTRICULAR RATE: 57 BPM
WBC # BLD AUTO: 7.04 THOUSAND/UL (ref 4.31–10.16)

## 2024-04-16 PROCEDURE — 83880 ASSAY OF NATRIURETIC PEPTIDE: CPT | Performed by: EMERGENCY MEDICINE

## 2024-04-16 PROCEDURE — 99285 EMERGENCY DEPT VISIT HI MDM: CPT

## 2024-04-16 PROCEDURE — 93010 ELECTROCARDIOGRAM REPORT: CPT | Performed by: INTERNAL MEDICINE

## 2024-04-16 PROCEDURE — 84484 ASSAY OF TROPONIN QUANT: CPT | Performed by: EMERGENCY MEDICINE

## 2024-04-16 PROCEDURE — 99213 OFFICE O/P EST LOW 20 MIN: CPT

## 2024-04-16 PROCEDURE — 93005 ELECTROCARDIOGRAM TRACING: CPT

## 2024-04-16 PROCEDURE — 71046 X-RAY EXAM CHEST 2 VIEWS: CPT

## 2024-04-16 PROCEDURE — 36415 COLL VENOUS BLD VENIPUNCTURE: CPT | Performed by: EMERGENCY MEDICINE

## 2024-04-16 PROCEDURE — 99285 EMERGENCY DEPT VISIT HI MDM: CPT | Performed by: EMERGENCY MEDICINE

## 2024-04-16 PROCEDURE — 83690 ASSAY OF LIPASE: CPT | Performed by: EMERGENCY MEDICINE

## 2024-04-16 PROCEDURE — 80048 BASIC METABOLIC PNL TOTAL CA: CPT | Performed by: EMERGENCY MEDICINE

## 2024-04-16 PROCEDURE — 85025 COMPLETE CBC W/AUTO DIFF WBC: CPT | Performed by: EMERGENCY MEDICINE

## 2024-04-16 RX ORDER — LIDOCAINE HYDROCHLORIDE 20 MG/ML
15 SOLUTION OROPHARYNGEAL ONCE
Status: COMPLETED | OUTPATIENT
Start: 2024-04-16 | End: 2024-04-16

## 2024-04-16 RX ORDER — SUCRALFATE ORAL 1 G/10ML
1 SUSPENSION ORAL 4 TIMES DAILY
Qty: 420 ML | Refills: 0 | Status: SHIPPED | OUTPATIENT
Start: 2024-04-16 | End: 2024-04-26

## 2024-04-16 RX ORDER — MAGNESIUM HYDROXIDE/ALUMINUM HYDROXICE/SIMETHICONE 120; 1200; 1200 MG/30ML; MG/30ML; MG/30ML
30 SUSPENSION ORAL ONCE
Status: COMPLETED | OUTPATIENT
Start: 2024-04-16 | End: 2024-04-16

## 2024-04-16 RX ORDER — ASPIRIN 81 MG/1
324 TABLET, CHEWABLE ORAL ONCE
Status: COMPLETED | OUTPATIENT
Start: 2024-04-16 | End: 2024-04-16

## 2024-04-16 RX ORDER — ASPIRIN 81 MG/1
324 TABLET, CHEWABLE ORAL DAILY
Status: DISCONTINUED | OUTPATIENT
Start: 2024-04-17 | End: 2024-04-16

## 2024-04-16 RX ADMIN — LIDOCAINE HYDROCHLORIDE 15 ML: 20 SOLUTION ORAL at 21:02

## 2024-04-16 RX ADMIN — ALUMINUM HYDROXIDE, MAGNESIUM HYDROXIDE, AND DIMETHICONE 30 ML: 200; 20; 200 SUSPENSION ORAL at 21:02

## 2024-04-16 RX ADMIN — ASPIRIN 324 MG: 81 TABLET, CHEWABLE ORAL at 19:44

## 2024-04-16 NOTE — Clinical Note
Rachel Novak was seen and treated in our emergency department on 4/16/2024.    No restrictions            Diagnosis:     Rachel  may return to work on return date.    She may return on this date: 04/18/2024         If you have any questions or concerns, please don't hesitate to call.      Ranjith Cesar MD    ______________________________           _______________          _______________  Hospital Representative                              Date                                Time

## 2024-04-16 NOTE — PROGRESS NOTES
[facility]        NAME: Rachel Novak is a 59 y.o. female  : 1965    MRN: [id number]  DATE: 2024  TIME: 8:09 PM    Assessment and Plan   Chest pain, unspecified type [R07.9]  1. Chest pain, unspecified type  ECG 12 lead    aspirin chewable tablet 324 mg    Transfer to other facility    DISCONTINUED: aspirin chewable tablet 324 mg          Patient Instructions   An ambulance was called to take you to be evaluated in the Emergency department    Chief Complaint     Chief Complaint   Patient presents with   • Chest Pain     Chest pain starting 45min ago. Pt states sudden onset. Pain midsternal and penetrates to back. Also radiating up RIGHT arm. Pain non-reproducible. Denies taking any medications at home for pain.         History of Present Illness       Chest pain starting 6:45 PM today, states the pain radiates to her back.    Chest Pain   Associated symptoms include back pain. Pertinent negatives include no cough, dizziness, fever or shortness of breath.       Review of Systems   Review of Systems   Constitutional:  Negative for chills and fever.   Respiratory:  Negative for cough and shortness of breath.    Cardiovascular:  Positive for chest pain (radiates to back).   Musculoskeletal:  Positive for back pain.   Neurological:  Negative for dizziness and light-headedness.         Current Medications       Current Outpatient Medications:   •  clobetasol (TEMOVATE) 0.05 % cream, Apply 1 application. topically as needed, Disp: , Rfl:   •  Cyanocobalamin (VITAMIN B 12 PO), Take by mouth, Disp: , Rfl:   •  EPINEPHrine (EPIPEN) 0.3 mg/0.3 mL SOAJ, INJECT 0.3 MLS (0.3 MG TOTAL) INTO THE MUSCLE ONCE FOR 1 DOSE., Disp: , Rfl:   •  escitalopram (LEXAPRO) 20 mg tablet, Take 1 tablet (20 mg total) by mouth daily, Disp: 90 tablet, Rfl: 3  •  Magnesium 250 MG TABS, Take 2 tablets (500 mg total) by mouth daily at bedtime, Disp: 180 tablet, Rfl: 3  •  metFORMIN (GLUCOPHAGE-XR) 500 mg 24 hr tablet,  TAKE ONE TABLET BY MOUTH EVERY DAY WITH BREAKFAST, Disp: 90 tablet, Rfl: 3  •  metoprolol succinate (TOPROL-XL) 25 mg 24 hr tablet, Take 1 tablet (25 mg total) by mouth 2 (two) times a day, Disp: 180 tablet, Rfl: 3  •  omeprazole (PriLOSEC) 40 MG capsule, Take 1 capsule (40 mg total) by mouth daily, Disp: 90 capsule, Rfl: 0  •  oxybutynin (DITROPAN-XL) 5 mg 24 hr tablet, Take 1 tablet (5 mg total) by mouth daily, Disp: 90 tablet, Rfl: 3  •  rosuvastatin (CRESTOR) 5 mg tablet, Take 1 tablet (5 mg total) by mouth daily, Disp: 30 tablet, Rfl: 11  •  Magnesium Oxide -Mg Supplement 250 MG TABS, , Disp: , Rfl:   No current facility-administered medications for this visit.    Current Allergies     Allergies as of 2024 - Reviewed 2024   Allergen Reaction Noted   • Bee venom Swelling and Vomiting 2018   • Metformin Diarrhea 2021   • Other Blisters 07/10/2015            The following portions of the patient's history were reviewed and updated as appropriate: allergies, current medications, past family history, past medical history, past social history, past surgical history and problem list.     Past Medical History:   Diagnosis Date   • Allergic    • Anxiety    • Depression    • Hypothyroidism     was previously on medication   • Prediabetes    • Syncope    • Urge incontinence    • Vitamin D deficiency        Past Surgical History:   Procedure Laterality Date   • CHOLECYSTECTOMY     • HYSTERECTOMY     • TONSILLECTOMY     • ULNAR TUNNEL RELEASE         Family History   Problem Relation Age of Onset   • Diabetes Mother    • Hypertension Mother    • Coronary artery disease Father         Dad  with MI 50's.  at age 78   • Diabetes Father    • Basal cell carcinoma Father    • Parkinsonism Father    • Hypertension Brother    • Transient ischemic attack Brother    • Heart disease Brother    • Breast cancer Paternal Aunt          Medications have been verified.        Objective   /77 (BP Location:  "Right arm, Patient Position: Sitting, Cuff Size: Standard)   Pulse 56   Temp 97.8 °F (36.6 °C) (Tympanic)   Resp 18   Ht 5' 7\" (1.702 m)   Wt 76 kg (167 lb 8.8 oz)   SpO2 98%   BMI 26.24 kg/m²   No LMP recorded. Patient has had a hysterectomy.       Physical Exam     Physical Exam  Vitals and nursing note reviewed.   Constitutional:       Appearance: Normal appearance.   HENT:      Head: Normocephalic and atraumatic.   Cardiovascular:      Comments: EKG sinus thomas.  Pulmonary:      Effort: Pulmonary effort is normal.   Skin:     General: Skin is warm and dry.      Capillary Refill: Capillary refill takes less than 2 seconds.   Neurological:      General: No focal deficit present.      Mental Status: She is alert and oriented to person, place, and time. Mental status is at baseline.      Sensory: No sensory deficit.      Motor: No weakness.   Psychiatric:         Mood and Affect: Mood normal.         Behavior: Behavior normal.         Thought Content: Thought content normal.                   "

## 2024-04-17 ENCOUNTER — TELEPHONE (OUTPATIENT)
Dept: CARDIOLOGY CLINIC | Facility: CLINIC | Age: 59
End: 2024-04-17

## 2024-04-17 NOTE — ED PROVIDER NOTES
History  Chief Complaint   Patient presents with    Chest Pain     Pt states chest pain that began at 645pm tonight that radiates to shoulder blade. Pt given 324mg of aspirin and 3 of nitro. Pt states chest pain is down to 3 but pain in shoulder blades is an 8.     Patient with stress echocardiogram on July 28, 2023  Echo Post Impression: The study is normal.  Exercise stress echo is negative for inducible ischemia.  ·  Peak Stress Echo: Left ventricle cavity has normal reduction in size at peak stress. The left ventricle systolic function is normal at peak stress. The peak stress echo showed normal wall motion.  ·  Stress ECG: Overall, the patient's exercise capacity was normal for their age. The patient reached stage 3.0 of the protocol after exercising for 9 min and 0 sec and had a maximal HR of 151 bpm (93 % of MPHR) and 10.1 METS.  ·  Stress ECG: Down sloping ST depression in the inferolateral leads (II, III, aVF, V5 and V6) is noted.  ·  Left Ventricle: Left ventricular cavity size is normal. Systolic function is normal. Wall motion is normal.  ·  Mitral Valve: There is mild regurgitation.      History provided by:  Medical records and patient  Chest Pain  Pain location:  Substernal area  Pain quality: sharp    Pain radiates to:  Upper back  Pain radiates to the back: yes    Pain severity:  Moderate  Onset quality:  Gradual  Duration: several hours.  Timing:  Constant  Progression:  Improving  Chronicity:  New  Context: eating    Relieved by:  Aspirin and nitroglycerin  Worsened by:  Nothing tried  Associated symptoms: heartburn and nausea    Associated symptoms: no abdominal pain, no cough, no dizziness, no dysphagia, no fatigue, no fever, no lower extremity edema, no numbness, no palpitations, no shortness of breath, not vomiting and no weakness    Risk factors: no prior DVT/PE        Prior to Admission Medications   Prescriptions Last Dose Informant Patient Reported? Taking?   Cyanocobalamin (VITAMIN B 12  PO)  Self Yes No   Sig: Take by mouth   EPINEPHrine (EPIPEN) 0.3 mg/0.3 mL SOAJ  Self Yes No   Sig: INJECT 0.3 MLS (0.3 MG TOTAL) INTO THE MUSCLE ONCE FOR 1 DOSE.   Magnesium 250 MG TABS   No No   Sig: Take 2 tablets (500 mg total) by mouth daily at bedtime   Magnesium Oxide -Mg Supplement 250 MG TABS   Yes No   Patient not taking: Reported on 3/26/2024   clobetasol (TEMOVATE) 0.05 % cream   Yes No   Sig: Apply 1 application. topically as needed   escitalopram (LEXAPRO) 20 mg tablet  Self No No   Sig: Take 1 tablet (20 mg total) by mouth daily   metFORMIN (GLUCOPHAGE-XR) 500 mg 24 hr tablet   No No   Sig: TAKE ONE TABLET BY MOUTH EVERY DAY WITH BREAKFAST   metoprolol succinate (TOPROL-XL) 25 mg 24 hr tablet   No No   Sig: Take 1 tablet (25 mg total) by mouth 2 (two) times a day   omeprazole (PriLOSEC) 40 MG capsule   No No   Sig: Take 1 capsule (40 mg total) by mouth daily   oxybutynin (DITROPAN-XL) 5 mg 24 hr tablet  Self No No   Sig: Take 1 tablet (5 mg total) by mouth daily   rosuvastatin (CRESTOR) 5 mg tablet  Self No No   Sig: Take 1 tablet (5 mg total) by mouth daily      Facility-Administered Medications Last Administration Doses Remaining   aspirin chewable tablet 324 mg 2024  7:44 PM 0          Past Medical History:   Diagnosis Date    Allergic     Anxiety     Depression     Hypothyroidism     was previously on medication    Prediabetes     Syncope     Urge incontinence     Vitamin D deficiency        Past Surgical History:   Procedure Laterality Date    CHOLECYSTECTOMY      HYSTERECTOMY      TONSILLECTOMY      ULNAR TUNNEL RELEASE         Family History   Problem Relation Age of Onset    Diabetes Mother     Hypertension Mother     Coronary artery disease Father         Dad  with MI 50's.  at age 78    Diabetes Father     Basal cell carcinoma Father     Parkinsonism Father     Hypertension Brother     Transient ischemic attack Brother     Heart disease Brother     Breast cancer Paternal Aunt      I  have reviewed and agree with the history as documented.    E-Cigarette/Vaping    E-Cigarette Use Never User      E-Cigarette/Vaping Substances    Nicotine No     THC No     CBD No     Flavoring No     Other No     Unknown No      Social History     Tobacco Use    Smoking status: Never    Smokeless tobacco: Never   Vaping Use    Vaping status: Never Used   Substance Use Topics    Alcohol use: Yes     Comment: social    Drug use: Never       Review of Systems   Constitutional:  Negative for activity change, appetite change, fatigue and fever.   HENT:  Negative for congestion, dental problem, ear pain, rhinorrhea, sore throat and trouble swallowing.    Eyes:  Negative for pain and redness.   Respiratory:  Negative for cough, chest tightness, shortness of breath and wheezing.    Cardiovascular:  Positive for chest pain. Negative for palpitations.   Gastrointestinal:  Positive for heartburn and nausea. Negative for abdominal pain, blood in stool, constipation, diarrhea and vomiting.   Endocrine: Negative for cold intolerance and heat intolerance.   Genitourinary:  Negative for dysuria, frequency and hematuria.   Musculoskeletal:  Negative for arthralgias and myalgias.   Skin:  Negative for color change, pallor and rash.   Neurological:  Negative for dizziness, weakness and numbness.   Hematological:  Does not bruise/bleed easily.   Psychiatric/Behavioral:  Negative for agitation, hallucinations and suicidal ideas.        Physical Exam  Physical Exam  Vitals and nursing note reviewed.   Constitutional:       Appearance: She is well-developed.   HENT:      Mouth/Throat:      Pharynx: No oropharyngeal exudate.   Eyes:      Conjunctiva/sclera: Conjunctivae normal.   Neck:      Comments: No meningeal signs  Cardiovascular:      Rate and Rhythm: Normal rate and regular rhythm.      Heart sounds: Normal heart sounds.   Pulmonary:      Effort: Pulmonary effort is normal. No respiratory distress.      Breath sounds: Normal breath  sounds. No wheezing or rales.   Chest:      Chest wall: No tenderness.   Abdominal:      General: Bowel sounds are normal. There is no distension.      Palpations: Abdomen is soft. There is no mass.      Tenderness: There is no abdominal tenderness.      Hernia: No hernia is present.      Comments: No cvat   Musculoskeletal:         General: Normal range of motion.      Cervical back: Normal range of motion and neck supple.   Lymphadenopathy:      Cervical: No cervical adenopathy.   Skin:     Findings: No erythema or rash.      Comments: No edema   Neurological:      Mental Status: She is alert and oriented to person, place, and time.      Cranial Nerves: No cranial nerve deficit.   Psychiatric:         Behavior: Behavior normal.         Vital Signs  ED Triage Vitals   Temperature Pulse Respirations Blood Pressure SpO2   04/16/24 2037 04/16/24 2035 04/16/24 2035 04/16/24 2035 04/16/24 2035   98.3 °F (36.8 °C) (!) 54 18 118/62 99 %      Temp Source Heart Rate Source Patient Position - Orthostatic VS BP Location FiO2 (%)   04/16/24 2037 04/16/24 2035 04/16/24 2035 04/16/24 2035 --   Oral Monitor Lying Right arm       Pain Score       --                  Vitals:    04/16/24 2035 04/16/24 2200   BP: 118/62 112/59   Pulse: (!) 54 (!) 53   Patient Position - Orthostatic VS: Lying Lying         Visual Acuity      ED Medications  Medications   aluminum-magnesium hydroxide-simethicone (MAALOX) oral suspension 30 mL (30 mL Oral Given 4/16/24 2102)   Lidocaine Viscous HCl (XYLOCAINE) 2 % mucosal solution 15 mL (15 mL Swish & Spit Given 4/16/24 2102)       Diagnostic Studies  Results Reviewed       Procedure Component Value Units Date/Time    HS Troponin I 2hr [[id number]] Collected: 04/16/24 2248    Lab Status: Final result Specimen: Blood from Hand, Left Updated: 04/16/24 2321     hs TnI 2hr <2 ng/L      Delta 2hr hsTnI --    HS Troponin I 4hr [[id number]]     Lab Status: No result Specimen: Blood     Basic metabolic panel  [[id number]] Collected: 04/16/24 2053    Lab Status: Final result Specimen: Blood from Hand, Left Updated: 04/16/24 2127     Sodium 137 mmol/L      Potassium 4.0 mmol/L      Chloride 107 mmol/L      CO2 26 mmol/L      ANION GAP 4 mmol/L      BUN 14 mg/dL      Creatinine 0.73 mg/dL      Glucose 95 mg/dL      Calcium 9.2 mg/dL      eGFR 90 ml/min/1.73sq m     Narrative:      National Kid[employer] Foundation guidelines for Chronic Kidney Disease (CKD):     Stage 1 with normal or high GFR (GFR > 90 mL/min/1.73 square meters)    Stage 2 Mild CKD (GFR = 60-89 mL/min/1.73 square meters)    Stage 3A Moderate CKD (GFR = 45-59 mL/min/1.73 square meters)    Stage 3B Moderate CKD (GFR = 30-44 mL/min/1.73 square meters)    Stage 4 Severe CKD (GFR = 15-29 mL/min/1.73 square meters)    Stage 5 End Stage CKD (GFR <15 mL/min/1.73 square meters)  Note: GFR calculation is accurate only with a steady state creatinine    Lipase [[id number]]  (Normal) Collected: 04/16/24 2053    Lab Status: Final result Specimen: Blood from Hand, Left Updated: 04/16/24 2127     Lipase 22 u/L     HS Troponin 0hr (reflex protocol) [[id number]]  (Normal) Collected: 04/16/24 2053    Lab Status: Final result Specimen: Blood from Hand, Left Updated: 04/16/24 2121     hs TnI 0hr <2 ng/L     B-Type Natriuretic Peptide(BNP) [[id number]]  (Normal) Collected: 04/16/24 2053    Lab Status: Final result Specimen: Blood from Hand, Left Updated: 04/16/24 2120     BNP 43 pg/mL     CBC and differential [[id number]]  (Abnormal) Collected: 04/16/24 2053    Lab Status: Final result Specimen: Blood from Hand, Left Updated: 04/16/24 2058     WBC 7.04 Thousand/uL      RBC 3.56 Million/uL      Hemoglobin 11.6 g/dL      Hematocrit 33.6 %      MCV 94 fL      MCH 32.6 pg      MCHC 34.5 g/dL      RDW 11.7 %      MPV 9.0 fL      Platelets 200 Thousands/uL      nRBC 0 /100 WBCs      Segmented % 41 %      Immature Grans % 0 %      Lymphocytes % 43 %      Monocytes % 9 %      Eosinophils  Relative 6 %      Basophils Relative 1 %      Absolute Neutrophils 2.89 Thousands/µL      Absolute Immature Grans 0.02 Thousand/uL      Absolute Lymphocytes 3.05 Thousands/µL      Absolute Monocytes 0.62 Thousand/µL      Eosinophils Absolute 0.42 Thousand/µL      Basophils Absolute 0.04 Thousands/µL                    XR chest 2 views   ED Interpretation by Ranjith Cesar MD (04/16 2159)   Primary reviewed: No acute abnormality                 Procedures  ECG 12 Lead Documentation Only    Date/Time: 4/16/2024 8:49 PM    Performed by: Ranjith Cesar MD  Authorized by: Ranjith Cesar MD    ECG reviewed by me, the ED Provider: yes    Patient location:  ED  Rate:     ECG rate:  55    ECG rate assessment: bradycardic    Rhythm:     Rhythm: sinus bradycardia    Ectopy:     Ectopy: none    QRS:     QRS axis:  Normal    QRS intervals:  Normal  Conduction:     Conduction: abnormal      Abnormal conduction: incomplete RBBB    ST segments:     ST segments:  Normal  T waves:     T waves: normal    ECG 12 Lead Documentation Only    Date/Time: 4/16/2024 11:02 PM    Performed by: Ranjith Cesar MD  Authorized by: Ranjith Cesar MD    ECG reviewed by me, the ED Provider: yes    Patient location:  ED  Interpretation:     Interpretation: normal    Rate:     ECG rate:  51    ECG rate assessment: bradycardic    Rhythm:     Rhythm: sinus bradycardia    Ectopy:     Ectopy: none    QRS:     QRS axis:  Normal  Conduction:     Conduction: abnormal      Abnormal conduction: incomplete RBBB    ST segments:     ST segments:  Normal  T waves:     T waves: normal             ED Course  ED Course as of 04/16/24 2327   Tue Apr 16, 2024   2205 Pt currently asymptomatic.   2325 Delta EKG and troponin negative.  Will reassure, , treat for GERD, PCP follow-up.             HEART Risk Score      Flowsheet Row Most Recent Value   Heart Score Risk Calculator    History 0 Filed at: 04/16/2024 2318   ECG 1 Filed at: 04/16/2024 2318   Age 1  Filed at: 04/16/2024 2318   Risk Factors 2 Filed at: 04/16/2024 2318   Troponin 0 Filed at: 04/16/2024 2318   HEART Score 4 Filed at: 04/16/2024 2318                          SBIRT 22yo+      Flowsheet Row Most Recent Value   Initial Alcohol Screen: US AUDIT-C     1. How often do you have a drink containing alcohol? 0 Filed at: 04/16/2024 2037   2. How many drinks containing alcohol do you have on a typical day you are drinking?  0 Filed at: 04/16/2024 2037   3b. FEMALE Any Age, or MALE 65+: How often do you have 4 or more drinks on one occassion? 0 Filed at: 04/16/2024 2037   Audit-C Score 0 Filed at: 04/16/2024 2037   DIVYA: How many times in the past year have you...    Used an illegal drug or used a prescription medication for non-medical reasons? Never Filed at: 04/16/2024 2037                      Medical Decision Making  Chest pain that radiates to the back, no history exam for just dissection or pulmonary embolism medical workup is not indicated.  Will do cardiac workup to rule out ACS, pneumonia, pneumothorax, congestive heart failure, dysrhythmia, mild/pericarditis, lipase rule out pancreatitis, reassess for disposition    Amount and/or Complexity of Data Reviewed  Labs: ordered.  Radiology: ordered and independent interpretation performed.    Risk  OTC drugs.  Prescription drug management.             Disposition  Final diagnoses:   Chest pain with low risk of acute coronary syndrome   Sinus bradycardia   Incomplete right bundle branch block (RBBB)     Time reflects when diagnosis was documented in both MDM as applicable and the Disposition within this note       Time User Action Codes Description Comment    4/16/2024 11:25 PM Ranjith Cesar Add [R07.9] Chest pain with low risk of acute coronary syndrome     4/16/2024 11:25 PM Ranjith Cesar Add [R00.1] Sinus bradycardia     4/16/2024 11:26 PM Ranjith Cesar Add [I45.10] Incomplete right bundle branch block (RBBB)           ED Disposition       ED Disposition    Discharge    Condition   Stable    Date/Time   Tue Apr 16, 2024 3743    Comment   Rachel Novak discharge to home/self care.                   Follow-up Information       Follow up With Specialties Details Why Contact Info    [facility], Nurse Practitioner Schedule an appointment as soon as possible for a visit in 2 days  [address] [address] [address]  [phone]              Patient's Medications   Discharge Prescriptions    SUCRALFATE (CARAFATE) 1 G/10 ML SUSPENSION    Take 10 mL (1 g total) by mouth 4 (four) times a day for 7 days       Start Date: 4/16/2024 End Date: 4/23/2024       Order Dose: 1 g       Quantity: 420 mL    Refills: 0       No discharge procedures on file.    PDMP Review       None            ED Provider  Electronically Signed by             Ranjith Cesar MD  04/16/24 1630

## 2024-04-17 NOTE — TELEPHONE ENCOUNTER
Patient called after going to ED last night with chest and back pain, nausea. (Patient took omeprazole (PriLOSEC) 40 MG capsule before going to [facility])  Went to [facility], patient was sent to ED and was diagnosed with acid reflux.     Patient was told to follow up with PCP in 2 days - patient stated that her PCP is leaving the office at the end of the month and didn't want to start a treatment plan with him if he is leaving.    Please advise    Patient can be reached at

## 2024-04-17 NOTE — TELEPHONE ENCOUNTER
Ok to move up the appt with me to May instead of July. She should call GI to see if she can be seen sooner there. Resume daily omeprazole 40 mg for now - let me know if she needs refills prior to her appt.

## 2024-04-26 ENCOUNTER — OFFICE VISIT (OUTPATIENT)
Dept: SLEEP CENTER | Facility: CLINIC | Age: 59
End: 2024-04-26
Payer: COMMERCIAL

## 2024-04-26 VITALS
SYSTOLIC BLOOD PRESSURE: 102 MMHG | WEIGHT: 167 LBS | DIASTOLIC BLOOD PRESSURE: 52 MMHG | HEART RATE: 53 BPM | HEIGHT: 67 IN | BODY MASS INDEX: 26.21 KG/M2

## 2024-04-26 DIAGNOSIS — G47.19 EXCESSIVE DAYTIME SLEEPINESS: ICD-10-CM

## 2024-04-26 DIAGNOSIS — R06.83 SNORING: Primary | ICD-10-CM

## 2024-04-26 DIAGNOSIS — G47.30 SLEEP DISORDER BREATHING: ICD-10-CM

## 2024-04-26 DIAGNOSIS — G25.81 RLS (RESTLESS LEGS SYNDROME): ICD-10-CM

## 2024-04-26 DIAGNOSIS — E61.1 IRON DEFICIENCY: ICD-10-CM

## 2024-04-26 PROCEDURE — 99203 OFFICE O/P NEW LOW 30 MIN: CPT

## 2024-04-26 NOTE — PATIENT INSTRUCTIONS
- An at home sleep study has been ordered to evaluate for sleep apnea.  This test should be scheduled at your earliest convenience.    - You may have sleep apnea.  Sleep apnea is a serious sleep disorder that occurs when a person's breathing is interrupted during sleep.  People with untreated sleep apnea stop breathing repeatedly during sleep.  - The most common type of sleep apnea is obstructive sleep apnea.  - If left untreated, obstructive sleep apnea can result in a number of health problems including high blood pressure, stroke, irregular heart rhythms, heart failure, diabetes, obesity and heart attacks.  - Treatment options for obstructive sleep apnea may include positive airway pressure (PAP) therapy, oral appliances, hypoglossal nerve stimulator, nose/throat surgery, weight loss, side sleeping or avoidance of medications or substances that can relax the airway muscles (alcohol, benzodiazepines and opioids).  - Avoid driving is drowsy.  It is recommended that if you are dozing while driving that you do not drive until your sleepiness is appropriately treated.  - It is important to lead a healthy lifestyle with adequate sleep (7-9 hours per night), balanced diet and routine exercise.

## 2024-04-26 NOTE — PROGRESS NOTES
[facility]  Sleep Medicine New Patient Evaluation    PATIENT NAME: Rachel Novak  DATE OF SERVICE: April 26, 2024    CONSULTING PROVIDER:  ISIAH Hull  [address] [place], [place] [address]EM[place] serna [id number]    ASSESSMENT/PLAN:  Rachel Novak is a 59 y.o. female with PMHx of SVT, HLD, prediabetes, GERD and anxiety who presents for sleep evaluation.    Snoring  Excessive Daytime Sleepiness  - The patient reports symptoms potentially consistent with sleep apnea including snoring and daytime sleepiness.  She also reported recent increase in weight which could be contributing.  STOP-BANG during today's visit was 3.  - Will obtain HSAT and discussed with patient that should the HSAT be negative the recommendation would be for PSG.  - Discussed with the patient the possible diagnosis, causes and conditions associated with SDB along with potential treatments.  The patient is amenable to discussing results/treatment plan via phone/MyChart.  - Encouraged healthy lifestyle with adequate sleep (7-9 hours per night), healthy balanced diet and routine exercise.  - Follow-up to be determined based on results of sleep study.  -     Ambulatory Referral to Sleep Medicine  -     Home Study; Future    RLS (Restless Legs Syndrome)  Iron Deficiency  - The patient reports symptoms that are partially consistent with RLS.  It does appear that her symptoms only occur at night and begin while she is at rest; however, they are only partially relieved by movement and she reports the best treatment she is found is going up and laying in her bed at which time the sensation resolves.  Currently her symptoms are happening 3-4 times a week and do not appear to be interfering with her ability to fall asleep.  - Will check AM fasting iron studies.  - Should ferritin < 100 ng/mL or %sat < 20% will plan to proceed with PO iron supplementation with 65mg elemental iron QOD +/- vitamin C.  - Non-medical therapy for  "RLS was discussed.  -If iron studies are unremarkable and she does not have sleep apnea can consider initiating a medication such as gabapentin.  -     Iron Panel (Includes Ferritin, Iron Sat%, Iron, and TIBC); Future    Thank you for allowing me to participate in the care of your patient.  If there are any questions regarding evaluation please feel free to reach out.   ________________________________________________________________________________________________    HPI: Rachel Novak is a 59 y.o. female with PMHx of HLD, prediabetes, GERD and anxiety who presents for sleep evaluation.  Sleep-Related History: No prior sleep studies or sleep consults.    The patient  was referred by her cardiologist due to history of snoring and daytime sleepiness with concern for sleep apnea.  She does have a history of SVT on Zio patch, but states her palpitations are mostly controlled at this point.  She snores at night and reports occasionally waking up with headaches in the morning.  Often she does not feel refreshed when she wakes up and struggles with daytime sleepiness at times.  She denies symptoms of HH, cataplexy, parasomnias or insomnia currently.  She does note having episodes of sleep paralysis once every few months with no clear trigger such as insufficient sleep the night prior.    She also reports having an urge to move the legs that only occurs in the evening.  Symptoms begin at 2030 and last until she lays down in bed and then the sensation passes.  The urge to move the legs begins when she is sitting in her reclining chair at night. The urge to move the legs is partially relieved by movements such as walking, but only actually resolves if she lays down in bed. The urge to move the legs occurs 3-4 nights per week and began 1 year ago without clear trigger.  She has not been told that she kicks her legs during sleep.    There is not history of iron deficiency or anemia.  No results found for: \"IRON\", " "\"TIBC\", \"FERRITIN\"    ESS: Total score: 3/24  Greater or equal to 10 is positive for excessive daytime sleepiness  Pertinent Meds: None    Sleep-Wake Schedule:  She is self-described as having no morning/night preference.  Bedtime: 3558-2922  Wake Time: 0600  Difficulty Falling Asleep: No  Avg Number of Awakenings: 2x  Cause of Awakenings: bathroom  Weekend Sleep Schedule: unchanged  Naps: denies    Avg TST per 24 hours: 6-7 hours    Sleep-Related Details:  Preferred Sleep Position: side(s)  SDB Symptoms: snoring, morning headaches, and waking unrefreshed  Bruxism: No  Nocturnal GERD: Yes, currently on omeprazole which is improving reflux symptoms  Nocturnal Palpitations: No  Nocturnal Anxiety or Rumination: No  Sleep-Related Hallucinations: No   Sleep Paralysis: Yes, 1x every 3 months, usually resolves within a few seconds to minutes    Cataplexy: No     She denies any parasomnia activity.    Wake-Related Details  Daytime Sleepiness: Yes, intermittently sleepy during the day, fatigue is also present.    Work Schedule: works as a  at a [employer] office, daytime hours  Drowsy Driving: No  Caffeine: Yes, will have 1 glass of tea per day  Alcohol Use: Yes, social use  Substance Use: No  Tobacco Use: No, denies vaping/e-cigs  Weight Change: has gained ~10lbs in the past 1-2 months since the death of her granddaughter    She does not have difficulty with memory or concentration.    Past Treatments:  None    Prior Sleep Studies:  None    Other Relevant Labs and Studies:  None    Past Medical History:   Diagnosis Date    Allergic     Anxiety     Depression     Hypothyroidism     was previously on medication    Prediabetes     Syncope     Urge incontinence     Vitamin D deficiency      Past Surgical History:   Procedure Laterality Date    CHOLECYSTECTOMY      HYSTERECTOMY      TONSILLECTOMY      ULNAR TUNNEL RELEASE       Patient Active Problem List   Diagnosis    Absence of cervix    Anxiety as acute " reaction to exceptional stress    Contact dermatitis and other eczema, due to unspecified cause    Mild tricuspid regurgitation    Family history of basal cell carcinoma    Family history of diabetes mellitus in first degree relative    Family history of coronary artery disease    History of decompression of both ulnar nerves    Incomplete right bundle branch block (RBBB) determined by electrocardiography    Menopause    Abnormal ECG during exercise stress test    Prediabetes    Rapid heart rate    Precordial pain    Shortened LA interval    Mild mitral regurgitation    Gastroesophageal reflux disease without esophagitis    Pure hypercholesterolemia    Sleep disorder breathing    SVT (supraventricular tachycardia)    Family history of polycystic kidney disease, autosomal recessive     Allergies as of 04/26/2024 - Reviewed 04/26/2024   Allergen Reaction Noted    Bee venom Swelling and Vomiting 01/12/2018    Metformin Diarrhea 08/25/2021    Other Blisters 07/10/2015     REVIEW OF SYSTEMS:  Review of Systems  10-point system review completed, all of which are negative except as mentioned above.    CURRENT MEDICATIONS:  Current Outpatient Medications   Medication Instructions    clobetasol (TEMOVATE) 0.05 % cream 1 application., Apply externally, As needed    Cyanocobalamin (VITAMIN B 12 PO) Oral    EPINEPHrine (EPIPEN) 0.3 mg/0.3 mL SOAJ INJECT 0.3 MLS (0.3 MG TOTAL) INTO THE MUSCLE ONCE FOR 1 DOSE.    escitalopram (LEXAPRO) 20 mg, Oral, Daily    Magnesium Oxide -Mg Supplement 250 MG TABS     Magnesium 500 mg, Oral, Daily at bedtime    metFORMIN (GLUCOPHAGE-XR) 500 mg, Oral, Daily with breakfast    metoprolol succinate (TOPROL-XL) 25 mg, Oral, 2 times daily    omeprazole (PRILOSEC) 40 mg, Oral, Daily    oxybutynin (DITROPAN-XL) 5 mg, Oral, Daily    rosuvastatin (CRESTOR) 5 mg, Oral, Daily    sucralfate (CARAFATE) 1 g, Oral, 4 times daily     SOCIAL HISTORY:  Social History     Tobacco Use    Smoking status: Never     "Smokeless tobacco: Never   Vaping Use    Vaping status: Never Used   Substance Use Topics    Alcohol use: Yes     Comment: social    Drug use: Never     FAMILY HISTORY:  Family History   Problem Relation Age of Onset    Diabetes Mother     Hypertension Mother     Coronary artery disease Father         Dad  with MI 50's.  at age 78    Diabetes Father     Basal cell carcinoma Father     Parkinsonism Father     Hypertension Brother     Transient ischemic attack Brother     Heart disease Brother     Breast cancer Paternal Aunt      Family History of Sleep Disorders: father had JOSE    PHYSICAL EXAMINATION:  Vital Signs:  /52 (BP Location: Left arm, Patient Position: Sitting, Cuff Size: Large)   Pulse (!) 53   Ht 5' 7\" (1.702 m)   Wt 75.8 kg (167 lb)   BMI 26.16 kg/m²   Body mass index is 26.16 kg/m².    Constitutional: NAD, well appearing   Mental Status: AAOx3  Neck Circumference: Neck Circumference: 15 inches  Skin: Warm, dry, no rashes noted   Eyes: PERRL, normal conjunctiva  ENT: Nasal congestion absent, nasal valve incompetence absent.  Posterior Airspace:   Hoskins Tongue Position: 3  Retrognathia: absent  Overbite: absent  High Arched Palate: absent  Tongue Scalloping/Ridging: absent  Tonsils: 1+  Uvula: normal  Chest: RRR, +S1/S2, CTA B/L, no W/R/R, no M/R/G   Abdomen: Soft, NT/ND  Extremities: No digital clubbing or pedal edema    I have spent a total time of 40 minutes on 24 in caring for this patient including Instructions for management, Patient and family education, Importance of tx compliance, Counseling / Coordination of care, Documenting in the medical record, Reviewing / ordering tests, medicine, procedures  , and Obtaining or reviewing history  .    Alma Rosa Strong MD  Pulmonary-Critical Care and Sleep Medicine  24    Portions of the record may have been created with voice recognition software. Occasional wrong word or \"sound a like\" substitutions may have occurred due to the " inherent limitations of voice recognition software. Please read the chart carefully and recognize, using context, where substitutions have occurred.

## 2024-04-29 ENCOUNTER — APPOINTMENT (OUTPATIENT)
Age: 59
End: 2024-04-29

## 2024-04-29 ENCOUNTER — APPOINTMENT (OUTPATIENT)
Age: 59
End: 2024-04-29
Payer: COMMERCIAL

## 2024-04-29 DIAGNOSIS — E61.1 IRON DEFICIENCY: ICD-10-CM

## 2024-04-29 DIAGNOSIS — Z00.8 HEALTH EXAMINATION IN POPULATION SURVEY: ICD-10-CM

## 2024-04-29 LAB
CHOLEST SERPL-MCNC: 162 MG/DL
EST. AVERAGE GLUCOSE BLD GHB EST-MCNC: 126 MG/DL
FERRITIN SERPL-MCNC: 197 NG/ML (ref 11–307)
HBA1C MFR BLD: 6 %
HDLC SERPL-MCNC: 61 MG/DL
IRON SATN MFR SERPL: 33 % (ref 15–50)
IRON SERPL-MCNC: 107 UG/DL (ref 50–212)
LDLC SERPL CALC-MCNC: 86 MG/DL (ref 0–100)
NONHDLC SERPL-MCNC: 101 MG/DL
TIBC SERPL-MCNC: 322 UG/DL (ref 250–450)
TRIGL SERPL-MCNC: 74 MG/DL
UIBC SERPL-MCNC: 215 UG/DL (ref 155–355)

## 2024-04-29 PROCEDURE — 82728 ASSAY OF FERRITIN: CPT

## 2024-04-29 PROCEDURE — 83550 IRON BINDING TEST: CPT

## 2024-04-29 PROCEDURE — 83540 ASSAY OF IRON: CPT

## 2024-04-29 PROCEDURE — 80061 LIPID PANEL: CPT

## 2024-04-29 PROCEDURE — 83036 HEMOGLOBIN GLYCOSYLATED A1C: CPT

## 2024-04-29 PROCEDURE — 36415 COLL VENOUS BLD VENIPUNCTURE: CPT

## 2024-05-05 ENCOUNTER — HOSPITAL ENCOUNTER (OUTPATIENT)
Dept: SLEEP CENTER | Facility: CLINIC | Age: 59
Discharge: HOME | End: 2024-05-05
Payer: COMMERCIAL

## 2024-05-05 DIAGNOSIS — G47.19 EXCESSIVE DAYTIME SLEEPINESS: ICD-10-CM

## 2024-05-05 DIAGNOSIS — R06.83 SNORING: ICD-10-CM

## 2024-05-05 PROCEDURE — G0399 HOME SLEEP TEST/TYPE 3 PORTA: HCPCS

## 2024-05-06 NOTE — PROGRESS NOTES
Home Sleep Study Documentation    HOME STUDY DEVICE: Noxturnal yes                                           Loly G3 no      Pre-Sleep Home Study:    Set-up and instructions performed by:  BIANCA Toledo    Technician performed demonstration for Patient: yes    Return demonstration performed by Patient: yes    Written instructions provided to Patient: yes    Patient signed consent form: yes        Post-Sleep Home Study:    Additional comments by Patient: None    Home Sleep Study Failed:no:    Failure reason: N/A    Reported or Detected: N/A    Scored by: BIANCA Manrique

## 2024-05-07 ENCOUNTER — PATIENT MESSAGE (OUTPATIENT)
Dept: SLEEP CENTER | Facility: CLINIC | Age: 59
End: 2024-05-07

## 2024-05-07 DIAGNOSIS — G47.33 OSA (OBSTRUCTIVE SLEEP APNEA): Primary | ICD-10-CM

## 2024-05-07 PROBLEM — R06.83 SNORING: Status: ACTIVE | Noted: 2024-05-07

## 2024-05-07 PROBLEM — G47.19 EXCESSIVE DAYTIME SLEEPINESS: Status: ACTIVE | Noted: 2024-05-07

## 2024-05-07 PROCEDURE — 95806 SLEEP STUDY UNATT&RESP EFFT: CPT

## 2024-05-14 DIAGNOSIS — F41.1 ANXIETY AS ACUTE REACTION TO EXCEPTIONAL STRESS: ICD-10-CM

## 2024-05-14 DIAGNOSIS — F43.0 ANXIETY AS ACUTE REACTION TO EXCEPTIONAL STRESS: ICD-10-CM

## 2024-05-15 RX ORDER — ESCITALOPRAM OXALATE 20 MG/1
20 TABLET ORAL DAILY
Qty: 90 TABLET | Refills: 1 | Status: SHIPPED | OUTPATIENT
Start: 2024-05-15

## 2024-05-24 ENCOUNTER — HOSPITAL ENCOUNTER (OUTPATIENT)
Dept: RADIOLOGY | Facility: CLINIC | Age: 59
End: 2024-05-24
Payer: COMMERCIAL

## 2024-05-24 ENCOUNTER — OFFICE VISIT (OUTPATIENT)
Dept: PODIATRY | Age: 59
End: 2024-05-24
Payer: COMMERCIAL

## 2024-05-24 VITALS
BODY MASS INDEX: 26.74 KG/M2 | TEMPERATURE: 98.2 F | HEIGHT: 67 IN | DIASTOLIC BLOOD PRESSURE: 64 MMHG | HEART RATE: 52 BPM | OXYGEN SATURATION: 98 % | SYSTOLIC BLOOD PRESSURE: 102 MMHG | WEIGHT: 170.4 LBS

## 2024-05-24 DIAGNOSIS — M79.671 RIGHT FOOT PAIN: ICD-10-CM

## 2024-05-24 DIAGNOSIS — M79.671 RIGHT FOOT PAIN: Primary | ICD-10-CM

## 2024-05-24 DIAGNOSIS — M77.41 METATARSALGIA OF RIGHT FOOT: ICD-10-CM

## 2024-05-24 PROCEDURE — 73630 X-RAY EXAM OF FOOT: CPT

## 2024-05-24 PROCEDURE — 99204 OFFICE O/P NEW MOD 45 MIN: CPT | Performed by: STUDENT IN AN ORGANIZED HEALTH CARE EDUCATION/TRAINING PROGRAM

## 2024-05-24 RX ORDER — MELOXICAM 7.5 MG/1
7.5 TABLET ORAL DAILY
Qty: 20 TABLET | Refills: 0 | Status: SHIPPED | OUTPATIENT
Start: 2024-05-24

## 2024-05-24 NOTE — PROGRESS NOTES
Ambulatory Visit  Name: Rachel Novak      : 1965      MRN: [id number]  Encounter Provider: Bethanie Ta DPM  Encounter Date: 2024   Encounter department: [facility] POD[facility] [place]    Assessment & Plan   1. Right foot pain  -     XR foot 3+ vw right; Future; Expected date: 2024  -     meloxicam (Mobic) 7.5 mg tablet; Take 1 tablet (7.5 mg total) by mouth daily  -     Orthotics B/L  -     Ambulatory referral to Physical Therapy; Future  2. Metatarsalgia of right foot  -     meloxicam (Mobic) 7.5 mg tablet; Take 1 tablet (7.5 mg total) by mouth daily  -     Orthotics B/L  -     Ambulatory referral to Physical Therapy; Future    Imaging Reviewed at this visit (I personally reviewed/independently interpreted images and reports in PACS)  XR right foot WB 3v: No acute osseous abnormalities noted. Hts 2-5. Shortened 1st metatarsal with mild 1st MTPJ arthritis.       IMPRESSION:  Right foot submet 2/3 head pain. Differential diagnosis include metatarsalgia (2/3) due to biomechanical foot structure, bursitis, tendonitis     PLAN:  I reviewed clinical exam and radiographic imaging (XR) with patient in detail today. I have discussed with the patient the pathophysiology of this diagnosis and reviewed how the examination correlates with this diagnosis.  I explained at length the biomechanical abnormalities leading to the significant overload of the metatarsals.   Instructions were given for conservative care which was also demonstrated during the clinical visit. I stressed the importance of achilles tendon stretching, icing and applying voltarin gel or biofreeze (OTC) at least 3x/day.    I recommend stiff bottomed sneakers/shoes (ex Asics, Vionic, New balance, Brunner, etc) for daily use and [employer] beattyka (recovery slides) for in home use.   Trial dancers pad, metatarsal pad, budin splint  I advised pt to obtain OTC sabina-Dananberg arch supports   I rx'd CMO's w/ either 1st ray cutout  "or metatarsal pad pending patient trial  I ordered the addition of PT at this visit to aide in reduction of equinus and address plantar foot pain  I rx'd short course of meloxicam today and discussed close monitoring for stomach bleed symptoms which she is to stop the medication immediately if she has such.  RTC 3 months for recheck      History of Present Illness     Rachel Novak is a 59 y.o. female who presents with right foot pain. Pain present for months. Worse at end of a shift at work when she is on her feet a lot. No trauma. Notes some swelling. Denies N/V/C/F. Denies wound. Denies N/T/B.    Review of Systems   Constitutional:  Negative for activity change, chills and fever.   HENT: Negative.     Respiratory:  Negative for cough, chest tightness and shortness of breath.    Cardiovascular:  Negative for chest pain and leg swelling.   Endocrine: Negative.    Genitourinary: Negative.    Musculoskeletal:  Positive for gait problem.   Neurological:  Negative for numbness.   Psychiatric/Behavioral: Negative.  Negative for agitation and behavioral problems.        Objective     /64   Pulse (!) 52   Temp 98.2 °F (36.8 °C) (Temporal)   Ht 5' 7\" (1.702 m)   Wt 77.3 kg (170 lb 6.4 oz)   SpO2 98%   BMI 26.69 kg/m²     Physical Exam  Vitals and nursing note reviewed.   Constitutional:       General: She is not in acute distress.     Appearance: Normal appearance. She is well-developed.   HENT:      Head: Normocephalic.   Eyes:      Conjunctiva/sclera: Conjunctivae normal.   Cardiovascular:      Pulses: Normal pulses.   Pulmonary:      Effort: Pulmonary effort is normal.   Abdominal:      Palpations: Abdomen is soft.      Tenderness: There is no abdominal tenderness.   Musculoskeletal:         General: Swelling (R submet 2/3 head), tenderness (Right submet 2/3 heads and plantar plate region) and deformity (mild HTs 2-5 right foot. prominent right submet 2/3 heads.) present.      Cervical back: Neck " supple.      Comments: Gastroc equinus. Rectus foot type   Skin:     General: Skin is warm and dry.      Capillary Refill: Capillary refill takes less than 2 seconds.      Findings: No bruising, erythema or lesion.   Neurological:      General: No focal deficit present.      Mental Status: She is alert.      Comments: - N/T/B to right foot. Negative mulders click   Psychiatric:         Mood and Affect: Mood normal.       Administrative Statements

## 2024-05-24 NOTE — PATIENT INSTRUCTIONS
Dancers or metatarsal pad    Possible Budin splint    Foot Pain Home Therapy    Wear supportive shoes at all times. Avoid flip-flops, flat sandals, barefoot walking (never walk barefoot, even at home). Generally avoid shoes that are too flexible and bend in the arch. Your shoes should only slightly bend in the toe area, not the middle. Running sneakers are often the best choice.  Supportive sneaker brands: Brunner, On Cloud, Hoka, Altra, New Balance, Asics, Mizuno  [employer] (discount if mention Dr umesh)  Fleet Feet [place]  [facility] [place]   spotflux [place]  Supportive daily shoe brands: Vionic, Orthofeet, Alcira, Dansko, Chacos, Bowen, Teva, Birkenstock  Supportive home shoes: Guzman Joyce (recovery slides)  Purchase over the counter topical pain creams such as Voltarin gel, biofreeze, or CBD cream - will need to apply 2-3 times per day for benefit.  Look in to over the counter shoe inserts/arch supports such as Fidel-Dananberg arch supports (take out tabs under 1st toe). These are all available on Amazon.com as well as their individual website's.   Mom-[email or web address]: Vasyli+Dananberg 1st Ray Orthotic, Medium, 1st Ray Function, Medium Density, Full-Length Insole, Heat Molding Optional, Best All Around Orthotic, Functional Biomechanical Control for Pain Relief, Black Yellow (45941) : Health & Household        Achilles Tendon Stretching Exercises    A) Standing Gastrocnemius stretch  Place hands on wall or chair. If using wall, put your hands at eye level.  Step the leg you want to stretch behind you. Keep your back heel on the floor and point your toes straight ahead or slightly inward towards the heel of the opposite foot.   Bend your knee toward the wall while keeping your back leg straight.  Lean toward the wall until you feel a gentle stretch in you calf of the straight leg. Don't lean so far that you feel pain.  Hold for 15 seconds. Complete 3 reps.    B) Standing soleus  stretch  Place your hands on the wall or chair. If using wall, put your hands at eye level.  Step the leg you want to stretch behind you (your back foot will need to be closer to the front foot than the above stretch). Keep your back heel on the floor and point your toes straight ahead or slightly inward towards the heel of the opposite foot.   Bend both your front and back knee at the same time (may help to stick your butt out). You do not need to lean towards the wall, just bend the knees.   Lean toward the wall until you feel a gentle stretch in you calf of the straight leg. Don't lean so far that you feel pain.  Hold for 15 seconds. Complete 3 reps.          Keep these tips and tricks in mind to get the most out of your stretching;  Take your time - move slowly, whether you are deepening into a stretch or changing positions. This will limit the risk of injury & discomfort.  Avoid bouncing - quick sudden movements will only worsen achilles tendon issues. Stay relaxed during stretch.  Keep your heel down and toes straight ahead or slightly inward - this will allow the achilles tendon to stretch properly.   Stop if you feel pain - Don't strain or force your muscle. If you feel sharp pain, stop immediately.

## 2024-06-03 DIAGNOSIS — N32.81 OAB (OVERACTIVE BLADDER): ICD-10-CM

## 2024-06-04 DIAGNOSIS — N32.81 OAB (OVERACTIVE BLADDER): ICD-10-CM

## 2024-06-04 RX ORDER — OXYBUTYNIN CHLORIDE 5 MG/1
5 TABLET, EXTENDED RELEASE ORAL DAILY
Qty: 90 TABLET | Refills: 1 | Status: SHIPPED | OUTPATIENT
Start: 2024-06-04

## 2024-06-04 RX ORDER — OXYBUTYNIN CHLORIDE 5 MG/1
5 TABLET, EXTENDED RELEASE ORAL DAILY
Qty: 90 TABLET | Refills: 1 | Status: SHIPPED | OUTPATIENT
Start: 2024-06-04 | End: 2024-06-04

## 2024-06-05 ENCOUNTER — EVALUATION (OUTPATIENT)
Dept: PHYSICAL THERAPY | Facility: CLINIC | Age: 59
End: 2024-06-05
Payer: COMMERCIAL

## 2024-06-05 DIAGNOSIS — M77.41 METATARSALGIA OF RIGHT FOOT: ICD-10-CM

## 2024-06-05 DIAGNOSIS — M79.671 RIGHT FOOT PAIN: Primary | ICD-10-CM

## 2024-06-05 PROCEDURE — 97161 PT EVAL LOW COMPLEX 20 MIN: CPT | Performed by: PHYSICAL THERAPIST

## 2024-06-05 PROCEDURE — 97535 SELF CARE MNGMENT TRAINING: CPT | Performed by: PHYSICAL THERAPIST

## 2024-06-05 NOTE — PROGRESS NOTES
PT Evaluation     Today's date: 2024  Patient name: Rachel Novak  : 1965  MRN: [id number]  Referring provider: Bethanie Ta DPM  Dx:   Encounter Diagnosis     ICD-10-CM    1. Right foot pain  M79.671       2. Metatarsalgia of right foot  M77.41                      Assessment  Impairments: abnormal or restricted ROM, activity intolerance, impaired balance, impaired physical strength, lacks appropriate home exercise program, pain with function, weight-bearing intolerance and activity limitations  Other impairment: decreased flexibility    Assessment details: The patient is a 60 y/o female who presents to PT with diagnosis of R foot pain and metatarsalgia of R foot.  She has complaints of intermittent pain along the lateral edge of her foot.  She demonstrates slight deficits with decreased ROM and strength and decreased balance.  These deficits lead to antalgic gait, limited standing tolerance and decreased tolerance to functional activities.  The patient would benefit from continued PT to address deficits and improve function.  Tx to include ROM, stretching, strengthening, modalities, HEP, pt education, postural ed, lifting/body mechanics, neuro re-ed, balance/proprioception Te, MT and equipment.  She is also scheduled for an orthotic evaluation next week.    Understanding of Dx/Px/POC: good     Prognosis: good    Goals  STGs:  1.  Initiate and complete HEP with verbal cues.  2.  Improve R ankle ROM by 5-10 degrees in 4 weeks.  3.  Improve R ankle strength by 1/2 grade in 4 weeks.  4.  Decrease R foot pain by > 25% in 4 weeks.  LTGs:  1.  Patient to be I with HEP in 8 weeks.  2.  Improve R ankle ROM to WNL t/o in 8 weeks to improve function.  3.  Improve R ankle strength to 5/5 t/o in 8 weeks to improve function.  4.  Decrease R foot pain to < or = to 1-2/10 with activity in 8 weeks to improve function.  5.  Patient to ambulate with normalized gait pattern in 8 weeks.  6.  Recreational  performance is improved to PLOF in 8 weeks.  7.  ADL performance is improved to PLOF in 8 weeks.  8.  Work performance is improved to maximal level of function in 8 weeks.          Plan  Patient would benefit from: skilled physical therapy and orthotics  Planned modality interventions: cryotherapy and thermotherapy: hydrocollator packs    Planned therapy interventions: IASTM, joint mobilization, manual therapy, balance, balance/weight bearing training, neuromuscular re-education, postural training, patient/caregiver education, self care, functional ROM exercises, gait training, therapeutic activities, stretching, strengthening, therapeutic exercise and home exercise program    Frequency: 2x week  Duration in weeks: 8  Plan of Care beginning date: 6/5/2024  Plan of Care expiration date: 7/31/2024  Treatment plan discussed with: patient  Plan details: Modalities and therapy interventions prn.          Subjective Evaluation    History of Present Illness  Mechanism of injury: The patient states that she has been having R foot pain for awhile now.  She notes that she has a swollen lump on the ball of the R foot behind her toes.      She had seen Dr. Ta on 5/24.  She had x-rays taken.  She was given foot pads and also Meloxicam, now taking it as needed.  She was also referred to OPPT and she now presents for her evaluation.  Will also be getting an orthotics evaluation next week.    Pain is intermittent.  Increased pain with prolonged walking 2* compensation.  She is unable to walk barefoot 2* pain.    Pain is along the outer part/lateral edge of her foot, not painful over the lump on her foot.    She denies any N&T or swelling in her foot.    From EMR review of x-rays from 5/24:  FINDINGS:  There is no acute fracture or dislocation.  Mild osteoarthritis of the first metatarsophalangeal joint  No lytic or blastic osseous lesion.  Soft tissues are unremarkable.  IMPRESSION:  No acute osseous abnormality.  Degenerative  "changes as described    Patient Goals  Patient goals for therapy: decreased pain, increased motion and increased strength  Patient goal: \"To have less pain with walking.\"  Pain  At best pain ratin  At worst pain ratin  Location: R Foot  Quality: sharp and discomfort  Relieving factors: rest  Aggravating factors: walking and standing    Social Support  Steps to enter house: no  Stairs in house: yes   Lives in: multiple-level home  Lives with: spouse    Employment status: working (Full Time - [employer] for [employer])    Diagnostic Tests  Abnormal x-ray: See Above.        Objective     Observations     Additional Observation Details  Bump noted along ball of R foot behind 2-3 toes.  When standing barefoot, pronation of B feet noted.      Tenderness     Right Ankle/Foot   No tenderness.     Neurological Testing     Sensation     Ankle/Foot   Left Ankle/Foot   Intact: light touch    Right Ankle/Foot   Intact: light touch     Active Range of Motion   Left Ankle/Foot   Dorsiflexion (ke): 0 degrees   Plantar flexion: 70 degrees   Inversion: 40 degrees   Eversion: 22 degrees     Right Ankle/Foot   Dorsiflexion (ke): 0 degrees   Plantar flexion: 60 degrees   Inversion: 32 degrees   Eversion: 20 degrees     Passive Range of Motion     Right Ankle/Foot    Plantar flexion: 68 degrees   Inversion: 40 degrees     Strength/Myotome Testing     Left Ankle/Foot   Dorsiflexion: WFL.   Plantar flexion: WFL.   Inversion: WFL.   Eversion: WFL.     Right Ankle/Foot   Dorsiflexion: WFL.   Plantar flexion: 4+  Inversion: 4+  Eversion: WFL.     Swelling   Left Ankle/Foot   Metatarsal heads: 22.5 cm  Figure 8: 57 cm  Malleoli: 25 cm    Right Ankle/Foot   Metatarsal heads: 22.5 cm  Figure 8: 57 cm  Malleoli: 25 cm    Ambulation     Ambulation: Stairs   Ascend stairs: independent  Pattern: reciprocal  Descend stairs: independent  Pattern: reciprocal                Precautions: Pre Diabetes, Anxiety, Depression, Syncope, Vit D Deficiency   " "    Manuals 6/5       R Ankle                                Neuro Re-Ed         SLS        Tandem Stance        Sidestepping w/TBand        Tandem Amb        BAPS Board                        Ther Ex        Bike        Calf Stretch with strap HEP       Soleus Stretch with strap HEP       Ankle TBand        Ankle Isometrics        HR/TR        Rockerboard  F/B, S/S        Toe Yoga        Arch Lifts                Ther Activity                        Gait Training                        Modalities        HP/CP prn                   Access Code: [email or web address]  URL: [email or web address]  Date: 06/05/2024  Prepared by: Yaz    Exercises  - Long Sitting Calf Stretch with Strap  - 1 x daily - 7 x weekly - 1 sets - 3-4 reps - 15\" hold  - Long Sitting Soleus Stretch on Bolster with Strap  - 1 x daily - 7 x weekly - 1 sets - 3-4 reps - 15\" hold         "

## 2024-06-12 ENCOUNTER — OFFICE VISIT (OUTPATIENT)
Dept: PHYSICAL THERAPY | Facility: CLINIC | Age: 59
End: 2024-06-12
Payer: COMMERCIAL

## 2024-06-12 ENCOUNTER — APPOINTMENT (OUTPATIENT)
Dept: PHYSICAL THERAPY | Facility: CLINIC | Age: 59
End: 2024-06-12
Payer: COMMERCIAL

## 2024-06-12 DIAGNOSIS — M77.41 METATARSALGIA OF RIGHT FOOT: ICD-10-CM

## 2024-06-12 DIAGNOSIS — M79.671 RIGHT FOOT PAIN: Primary | ICD-10-CM

## 2024-06-12 PROCEDURE — 97760 ORTHOTIC MGMT&TRAING 1ST ENC: CPT

## 2024-06-12 NOTE — PROGRESS NOTES
Orthotic Evaluation    Today's date: 24  Patient name: Rachel Novak  : 1965  MRN: [id number]  Referring provider: Bethanie Ta DPM  Dx:   Encounter Diagnosis     ICD-10-CM    1. Right foot pain  M79.671       2. Metatarsalgia of right foot  M77.41           Start Time: 0700  Stop Time: 0745  Total time in clinic (min): 45 minutes     Subjective:    Rachel presents today for orthotic evaluation. she complains of pain and ambulatory dysfunction with functional activities including ambulation, transfers, and work. Pain started gradually after she noticed swelling over 2nd and 3rd metatarsal heads. Has seen podiatry and had x-rays. The patient plans to use her orthotic for walking, standing, and work. The orthotic will be placed in a standard, size 8 regular width. Is prediabetic currently and has family history of DMT2. Denies neuropathy.     Objective:    Age: 59 y.o.  Weight: 168    Foot Posture Index Score    Right Foot  Talar dome - +1  Malleolar curve - +1   Calcaneal eversion - -1  Talonavicular bulge - 0  Medial longitudinal arch - 0  Too many toes - +1  Total Score R = 2    Left Foot  Talar dome - +2  Malleolar curve - 0   Calcaneal eversion - 0  Talonavicular bulge - +1  Medial longitudinal arch - +1  Too many toes - +1  Total Score L = 5    Reference Values  Normal =    0 to +5  Pronated =  +6 to +9 Highly Pronated =  10+  Supinated =   -1 to -4 Highly Supinated = -5 to -12    Objective  Gait: On R, demonstrates supination on initial contact c/ weightbearing through lateral column. On L, supinates on initial contact, pronates into midstance, pronation lasts longer than expected and then quickly re-supinates prior to toe-off.     Ankle/Foot Mobility and Observations:  B ankle DF ROM to beyond neutral in [place].  B GTE WNL.(-)  (+) relaxation of dorsal toe extensor tendons c/ splaying of metatarsal head b/l.  Subtalar joint has good mobility bilaterally into eversion and inversion.  Slightly less ROM on R side.   Callusing present on L just medial to 1st MTP and IP, R lateral 5th MTP    Assessment:    Patient requires custom foot orthosis with arch support, deepened heel cup and metatarsal pad to correct altered gait mechanics. Patient is not currently controlled by her current shoe wear. Patient was educated on the design of the custom orthotic and it's ability to offload her current pathology. Patient was also educated on potential shoe wear changes with device, break-in period, and skin checks to avoid potential skin break down.     Orthotic goals:  1) Patient will have custom foot orthoses fitted to her shoe.   2) Patient will be compliant with break-in period of custom foot orthoses as prescribed by PT.   3) Patient will be compliant with custom foot orthoses use as prescribed by PT.     Plan:    Planned therapy interventions: orthotic fitting/training  Duration in visits: 2

## 2024-06-19 ENCOUNTER — OFFICE VISIT (OUTPATIENT)
Dept: PHYSICAL THERAPY | Facility: CLINIC | Age: 59
End: 2024-06-19
Payer: COMMERCIAL

## 2024-06-19 DIAGNOSIS — M79.671 RIGHT FOOT PAIN: Primary | ICD-10-CM

## 2024-06-19 DIAGNOSIS — M77.41 METATARSALGIA OF RIGHT FOOT: ICD-10-CM

## 2024-06-19 PROCEDURE — 97110 THERAPEUTIC EXERCISES: CPT

## 2024-06-19 PROCEDURE — 97140 MANUAL THERAPY 1/> REGIONS: CPT

## 2024-06-19 NOTE — PROGRESS NOTES
"Daily Note     Today's date: 2024  Patient name: Rachel Novak  : 1965  MRN: [id number]  Referring provider: Bethanie Ta DPM  Dx:   Encounter Diagnosis     ICD-10-CM    1. Right foot pain  M79.671       2. Metatarsalgia of right foot  M77.41           Start Time: 0700  Stop Time: 0750  Total time in clinic (min): 50 minutes    Subjective: Reports she would like to continue to f/u after trialing HEP after this visit. No significant changes in right foot.       Objective: See treatment diary below      Assessment: Tolerated treatment well. Patient demonstrated fatigue post treatment, exhibited good technique with therapeutic exercises, and would benefit from continued PT. Provided education regarding optimal foot posture in stance. Provided patient c/ handout c/ updated HEP. Patient demonstrated each exercise c/ good form and verbalized understanding of expectations c/ HEP.        Plan: Continue per plan of care.         Precautions: Pre Diabetes, Anxiety, Depression, Syncope, Vit D Deficiency       Manuals       R Ankle  Post Talus [place] Gr II KS      Ankle PROM  KS                      Neuro Re-Ed         SLS  Reviewed for HEP      Tandem Stance        Sidestepping w/TBand        Tandem Amb        BAPS Board                        Ther Ex        Bike        Calf Stretch with strap HEP 30\"/4 Wall      Soleus Stretch with strap HEP 30\"/4 Wall      Ankle TBand  3 way GTB x10ea      Ankle Isometrics        HR/TR  Seated v. Standing 2/10      Rockerboard  F/B, S/S        Toe Yoga  x10ea      Arch Lifts  x10ea      Education  Foot Posture, Balance, Offloading and decreasing stress on lateral column              Ther Activity                        Gait Training                        Modalities        HP/CP prn                   Access Code: [email or web address]  URL: [email or web address]  Date: 2024  Prepared by: Jelena Aviles    Exercises  - Long Sitting Calf Stretch with Strap  - " "1 x daily - 7 x weekly - 1 sets - 3-4 reps - 15\" hold  - Long Sitting Soleus Stretch on Bolster with Strap  - 1 x daily - 7 x weekly - 1 sets - 3-4 reps - 15\" hold  - Seated Arch Lifts  - 1 x daily - 7 x weekly - 3 sets - 10 reps  - Toe Yoga - Alternating Great Toe and Lesser Toe Extension  - 1 x daily - 7 x weekly - 3 sets - 10 reps  - Heel Raises with Counter Support  - 1 x daily - 7 x weekly - 3 sets - 10 reps  - Seated Heel Raise  - 1 x daily - 7 x weekly - 3 sets - 10 reps  - Seated Heel Toe Raises  - 1 x daily - 7 x weekly - 3 sets - 10 reps  - Long Sitting Ankle Eversion with Resistance  - 1 x daily - 7 x weekly - 3 sets - 10 reps  - Long Sitting Ankle Inversion with Resistance  - 1 x daily - 7 x weekly - 3 sets - 10 reps  - Long Sitting Ankle Dorsiflexion with Anchored Resistance  - 1 x daily - 7 x weekly - 3 sets - 10 reps  - Single Leg Stance  - 1 x daily - 7 x weekly - 3-5 reps - 10-30\" hold  - Gastroc Stretch on Wall  - 1 x daily - 7 x weekly - 4 reps - 30 seconds hold  - Soleus Stretch on Wall  - 1 x daily - 7 x weekly - 4 reps - 30 seconds hold         "

## 2024-07-02 ENCOUNTER — OFFICE VISIT (OUTPATIENT)
Dept: SLEEP CENTER | Facility: CLINIC | Age: 59
End: 2024-07-02
Payer: COMMERCIAL

## 2024-07-02 VITALS
OXYGEN SATURATION: 100 % | HEART RATE: 52 BPM | DIASTOLIC BLOOD PRESSURE: 56 MMHG | WEIGHT: 173 LBS | HEIGHT: 67 IN | SYSTOLIC BLOOD PRESSURE: 103 MMHG | BODY MASS INDEX: 27.15 KG/M2

## 2024-07-02 DIAGNOSIS — G47.33 OSA (OBSTRUCTIVE SLEEP APNEA): Primary | ICD-10-CM

## 2024-07-02 DIAGNOSIS — G25.81 RLS (RESTLESS LEGS SYNDROME): ICD-10-CM

## 2024-07-02 PROCEDURE — 99213 OFFICE O/P EST LOW 20 MIN: CPT

## 2024-07-02 NOTE — PATIENT INSTRUCTIONS
- Recommend sleeping off of your back for treatment of obstructive sleep apnea.  You will need to purchase a positional sleeper device.  Examples include: SLUMBER BUMP, REMATEE or ZZOMA.  - We discussed oral appliance therapy for your obstructive sleep apnea.  An oral appliance will need to be made by a dentist trained in sleep medicine, you can find one local to you here: [email or web address]://[email or web address].[email or web address].org/members/directory/search_bootstrap.[email or web address]?org_[email or web address]    There is a dentist certified in dental sleep medicine:  [facility]  Kasia Chapin  Degree: DMD  [address] [place] [address]    Phone: [phone]

## 2024-07-02 NOTE — PROGRESS NOTES
[facility]  Sleep Medicine Follow Up/Established Patient    PATIENT NAME: Rachel Novak  DATE OF SERVICE: July 3, 2024  DATE OF LAST VISIT: 4/26/2024    ASSESSMENT/PLAN:  Rachel Novak is a 59 y.o. female with PMHx of SVT, HLD, prediabetes, GERD and anxiety who returns to the office for follow up.    JOSE (obstructive sleep apnea)  -The patient has a history of mild JOSE diagnosed on HSAT in 5/2024 (ELLA 9.9, supine ELLA 13.7, O2 beatrice 87%, TST <90% 3.6 min) and is currently prescribed auto CPAP 5-15 cmH2O.  She is unable to tolerate the device and at this time is not interested in pressure changes or mask refitting and would like to return the CPAP.  We discussed the reasons to treat sleep apnea and potential effects of untreated sleep apnea and she verbalized good understanding.  -Her off supine ELLA was 5 on her HSAT so positional therapy could be beneficial for her and may be more tolerable.  Will provide recommendations for positional devices in her discharge paperwork.  -She could also be a candidate for a mat as she denies any history of TMJ or dental issues and does not have any dentures or implants.  Referral placed and she was given a link to the [facility] find-a-doc website.  - Encouraged healthy lifestyle with adequate sleep (7-9 hours per night), healthy balanced diet and routine exercise.  Explained the importance of avoiding driving while drowsy.  - Follow-up as needed.  Recommend if she decides to proceed with MAD that she return for repeat HSAT after device is fully titrated.  -     Mandible Advancing Appliance    Restless Leg Syndrome  - The patient reports symptoms that are partially consistent with RLS and recent iron studies were within goal for restless leg syndrome.  At this time she does not feel the symptoms are really bothersome enough to try starting a medication.  If she were to change her mind would consider initiation of  gabapentin.  ________________________________________________________________________________________________    Interval History: Rachel Novak is a 59 y.o. female with PMHx of SVT, HLD, prediabetes, GERD and anxiety who returns to the office for follow up.  The patient reports that she tried the CPAP for a few days and constantly felt suffocated.  She struggles to describe in the pressures were too high or too low and noted it was more the sensation of having something on her face while trying to sleep.  She did try both a nasal and nasal pillows interface but neither was tolerable.  She is not interested in trying a FFM or pressure adjustment due to how much she struggled to use the device.  Overall she feels that CPAP is not for her and she is interested in hearing about alternatives.    She did have her iron studies done and they were within goal for RLS.  She states her leg symptoms are relatively unchanged from prior and notes that they are not overly bothersome.  Her symptoms are not preventing her from falling asleep at night either.  She is not interested in starting a medication for RLS at this time.    Prior History: The patient for started following with [facility] sleep medicine in 4/2024 at which time she is having symptoms consistent with sleep apnea and recommendation was for HSAT.  Testing showed overall mild JOSE that was mostly present in the supine position (ELLA 9.9, supine ELLA 13.7, O2 beatrice 87%, TST <90% 3.6 min) and recommendation was for initiation of auto CPAP 5-15 cmH2O which was prescribed.  She also was having symptoms partially consistent with RLS and plan was for to check iron studies which were normal.  She is returning today for initial compliance follow-up for her CPAP.    ESS: Total score: 1/24  Greater or equal to 10 is positive for excessive daytime sleepiness  Pertinent Meds: None    Sleep-Wake Schedule:  Bedtime: 2454-1208  Wake Time: 0600  Difficulty Falling Asleep:  No  Avg Number of Awakenings: 2x  Cause of Awakenings: bathroom  Weekend Sleep Schedule: unchanged  Naps: denies     Avg TST per 24 hours: 6-7 hours    Past Treatments:  APAP 5-15 cmH2O    Prior Sleep Studies:  HSAT -- 5/7/2024 (Wt 75.7 kg)  ELLA - 9.9  Sup ELLA - 13.7  O2 Harvinder - 87.0%  TST < 90% - 3.6 min    Other Relevant Labs and Studies:  None    Past Medical History:   Diagnosis Date    Allergic     Anxiety     Depression     Hypothyroidism     was previously on medication    Prediabetes     Syncope     Urge incontinence     Vitamin D deficiency      Past Surgical History:   Procedure Laterality Date    CHOLECYSTECTOMY      HYSTERECTOMY      TONSILLECTOMY      ULNAR TUNNEL RELEASE       Patient Active Problem List   Diagnosis    Absence of cervix    Anxiety as acute reaction to exceptional stress    Contact dermatitis and other eczema, due to unspecified cause    Mild tricuspid regurgitation    Family history of basal cell carcinoma    Family history of diabetes mellitus in first degree relative    Family history of coronary artery disease    History of decompression of both ulnar nerves    Incomplete right bundle branch block (RBBB) determined by electrocardiography    Menopause    Abnormal ECG during exercise stress test    Prediabetes    Rapid heart rate    Precordial pain    Shortened DC interval    Mild mitral regurgitation    Gastroesophageal reflux disease without esophagitis    Pure hypercholesterolemia    Sleep disorder breathing    SVT (supraventricular tachycardia)    Family history of polycystic kidney disease, autosomal recessive    JOSE (obstructive sleep apnea)    Excessive daytime sleepiness    Snoring    Right foot pain    Metatarsalgia of right foot     Allergies as of 07/02/2024 - Reviewed 07/02/2024   Allergen Reaction Noted    Bee venom Swelling and Vomiting 01/12/2018    Metformin Diarrhea 08/25/2021    Other Blisters 07/10/2015     REVIEW OF SYSTEMS:  Review of Systems  10-point system  "review completed, all of which are negative except as mentioned above.    CURRENT MEDICATIONS:  Current Outpatient Medications   Medication Instructions    clobetasol (TEMOVATE) 0.05 % cream 1 application., Apply externally, As needed    Cyanocobalamin (VITAMIN B 12 PO) Oral    EPINEPHrine (EPIPEN) 0.3 mg/0.3 mL SOAJ INJECT 0.3 MLS (0.3 MG TOTAL) INTO THE MUSCLE ONCE FOR 1 DOSE.    escitalopram (LEXAPRO) 20 mg, Oral, Daily    Magnesium Oxide -Mg Supplement 250 MG TABS     Magnesium 500 mg, Oral, Daily at bedtime    meloxicam (MOBIC) 7.5 mg, Oral, Daily    metFORMIN (GLUCOPHAGE-XR) 500 mg, Oral, Daily with breakfast    metoprolol succinate (TOPROL-XL) 25 mg, Oral, 2 times daily    omeprazole (PRILOSEC) 40 mg, Oral, Daily    oxybutynin (DITROPAN-XL) 5 mg, Oral, Daily    rosuvastatin (CRESTOR) 5 mg, Oral, Daily     SOCIAL HISTORY:  Social History     Tobacco Use    Smoking status: Never    Smokeless tobacco: Never   Vaping Use    Vaping status: Never Used   Substance Use Topics    Alcohol use: Yes     Comment: social    Drug use: Never     FAMILY HISTORY:  Family History   Problem Relation Age of Onset    Diabetes Mother     Hypertension Mother     Coronary artery disease Father         Dad  with MI 50's.  at age 78    Diabetes Father     Basal cell carcinoma Father     Parkinsonism Father     Hypertension Brother     Transient ischemic attack Brother     Heart disease Brother     Breast cancer Paternal Aunt      PHYSICAL EXAMINATION:  Vital Signs:  /56 (BP Location: Left arm, Patient Position: Sitting, Cuff Size: Large)   Pulse (!) 52   Ht 5' 7\" (1.702 m)   Wt 78.5 kg (173 lb)   SpO2 100%   BMI 27.10 kg/m²   Body mass index is 27.1 kg/m².  Constitutional: NAD, well appearing   Mental Status: AAOx3  Skin: Warm, dry, no rashes noted   Eyes: PERRL, normal conjunctiva  ENT: Nasal congestion absent, nasal valve incompetence absent.  Posterior Airspace:   H[place]kins Tongue Position: 3  Retrognathia: " "absent  Overbite: absent  High Arched Palate: absent  Tongue Scalloping/Ridging: absent  Tonsils: 1+  Uvula: normal  Chest: RRR, +S1/S2, CTA B/L, no W/R/R, no M/R/G   Abdomen: Soft, NT/ND  Extremities: No digital clubbing or pedal edema      Alma Rosa Strong MD  Pulmonary-Critical Care and Sleep Medicine  07/03/24    Portions of the record may have been created with voice recognition software. Occasional wrong word or \"sound a like\" substitutions may have occurred due to the inherent limitations of voice recognition software. Please read the chart carefully and recognize, using context, where substitutions have occurred.   "

## 2024-07-03 ENCOUNTER — OFFICE VISIT (OUTPATIENT)
Dept: PHYSICAL THERAPY | Facility: CLINIC | Age: 59
End: 2024-07-03
Payer: COMMERCIAL

## 2024-07-03 DIAGNOSIS — M79.671 RIGHT FOOT PAIN: Primary | ICD-10-CM

## 2024-07-03 DIAGNOSIS — M77.41 METATARSALGIA OF RIGHT FOOT: ICD-10-CM

## 2024-07-03 PROCEDURE — L3010 FOOT LONGITUDINAL ARCH SUPPO: HCPCS

## 2024-07-03 PROCEDURE — 97140 MANUAL THERAPY 1/> REGIONS: CPT

## 2024-07-03 NOTE — PROGRESS NOTES
"Daily Note     Today's date: 7/3/2024  Patient name: Rachel Novak  : 1965  MRN: [id number]  Referring provider: Bethanie Ta DPM  Dx:   Encounter Diagnosis     ICD-10-CM    1. Right foot pain  M79.671       2. Metatarsalgia of right foot  M77.41           Start Time: 0700  Stop Time: 0740  Total time in clinic (min): 40 minutes    Subjective: Report no difficulties c/ HEP.      Objective: See treatment diary below      Assessment: Tolerated treatment well. Patient demonstrated fatigue post treatment and exhibited good technique with therapeutic exercises. Stiffness still noted in TC joint and gastroc m. Emphasized need for continued stretching and strengthening and educated patient regarding HEP frequency. Patient verbalized understanding.  Custom orthotics fitted to patients shoe and dispensed. Patient educated on appropriate break in period. Patient will follow up if any future problems arise.        Plan: Continue per plan of care. F/u prn with orthotics.      Precautions: Pre Diabetes, Anxiety, Depression, Syncope, Vit D Deficiency       Manuals  7/3     R Ankle  Post Talus [place] Gr [place] Post Talus [place] Gr II KS     Ankle PROM  KS KS                     Neuro Re-Ed         SLS  Reviewed for HEP      Tandem Stance        Sidestepping w/TBand        Tandem Amb        BAPS Board                        Ther Ex        Bike        Calf Stretch with strap HEP 30\"/4 Wall      Soleus Stretch with strap HEP 30\"/4 Wall      Ankle TBand  3 way GTB x10ea      Ankle Isometrics        HR/TR  Seated v. Standing 2/10      Rockerboard  F/B, S/S        Toe Yoga  x10ea      Arch Lifts  x10ea      Education  Foot Posture, Balance, Offloading and decreasing stress on lateral column              Ther Activity                        Gait Training                        Modalities        HP/CP prn                   Access Code: [email or web address]  URL: [email or web address]  Date: 2024  Prepared " "by: Jelena Aviles    Exercises  - Long Sitting Calf Stretch with Strap  - 1 x daily - 7 x weekly - 1 sets - 3-4 reps - 15\" hold  - Long Sitting Soleus Stretch on Bolster with Strap  - 1 x daily - 7 x weekly - 1 sets - 3-4 reps - 15\" hold  - Seated Arch Lifts  - 1 x daily - 7 x weekly - 3 sets - 10 reps  - Toe Yoga - Alternating Great Toe and Lesser Toe Extension  - 1 x daily - 7 x weekly - 3 sets - 10 reps  - Heel Raises with Counter Support  - 1 x daily - 7 x weekly - 3 sets - 10 reps  - Seated Heel Raise  - 1 x daily - 7 x weekly - 3 sets - 10 reps  - Seated Heel Toe Raises  - 1 x daily - 7 x weekly - 3 sets - 10 reps  - Long Sitting Ankle Eversion with Resistance  - 1 x daily - 7 x weekly - 3 sets - 10 reps  - Long Sitting Ankle Inversion with Resistance  - 1 x daily - 7 x weekly - 3 sets - 10 reps  - Long Sitting Ankle Dorsiflexion with Anchored Resistance  - 1 x daily - 7 x weekly - 3 sets - 10 reps  - Single Leg Stance  - 1 x daily - 7 x weekly - 3-5 reps - 10-30\" hold  - Gastroc Stretch on Wall  - 1 x daily - 7 x weekly - 4 reps - 30 seconds hold  - Soleus Stretch on Wall  - 1 x daily - 7 x weekly - 4 reps - 30 seconds hold           "

## 2024-07-10 DIAGNOSIS — E78.2 MIXED HYPERLIPIDEMIA: ICD-10-CM

## 2024-07-11 DIAGNOSIS — E78.2 MIXED HYPERLIPIDEMIA: ICD-10-CM

## 2024-07-11 RX ORDER — ROSUVASTATIN CALCIUM 5 MG/1
5 TABLET, COATED ORAL DAILY
Qty: 90 TABLET | Refills: 0 | Status: SHIPPED | OUTPATIENT
Start: 2024-07-11 | End: 2024-07-12

## 2024-07-12 RX ORDER — ROSUVASTATIN CALCIUM 5 MG/1
5 TABLET, COATED ORAL DAILY
Qty: 100 TABLET | Refills: 1 | Status: SHIPPED | OUTPATIENT
Start: 2024-07-12

## 2024-07-19 NOTE — PROGRESS NOTES
Cardiology Follow Up    Rachel Novak  1965  [id number]  [facility] [place] [address]  [address]LO[address]  [facility] [address] [phone]    Rachel presents for follow up of palpitations, chest pressure, family history of CAD.     1. SVT (supraventricular tachycardia)  Assessment & Plan:  Referral to cardiology for intermittent rapid heart rates.   ECG with short IA interval (no delta waves).  Prior history of syncope x 2.   Echocardiogram and stress echo 7/28/2023 are unrevealing.  14 day ZIO monitor 6/15-7/5/2023 show 29 runs of SVT, longest 10.7 seconds. 13 triggers and 10 diary entries primarily correlate with NSR, with one episode of fluttering/racing correlating with the longest SVT episode.  She has since been started on metoprolol succinate 25 mg BID with complete symptom relief.  Continue magnesium to 500 mg daily.  JOSE testing reveals mild sleep apnea.  2. Shortened IA interval  Assessment & Plan:  Short IA interval noted on ECG 6/16/2023 (92 ms) without delta waves.   Prior episodes of syncope x 2.   Non ischemic stress echocardiogram 2014 and 7/28/2023.  Exercise stress test by cardiology report was positive for ischemia prompting stress echocardiogram (presumed false positive as imaging showed no ischemia).  Most recent ECG's with normal IA interval of 124.  Will continue to monitor.  3. Abnormal ECG during exercise stress test  Assessment & Plan:  False positive exercise stress test documented in cardiology note 12/3/2014.   Stress echocardiogram was done for follow up 12/2014 and showed no evidence of ischemia.   Repeat stress echo 7/28/2023 shows good exercise tolerance with no echo evidence of ischemia  4. Family history of coronary artery disease  Assessment & Plan:  Father with MI in either 40's or 50's and eventual CABG.   Recommend aggressive risk factor modification.   Ideally would like to see LDL < 100.  Reviewed importance  of BP/glucose control as well.  5. Pure hypercholesterolemia  Assessment & Plan:  Lipid panel 4/29/2024: C 162. T 74. H 61. L 86.  Currently on rosuvastatin 5 mg daily.  Goal LDL < 100  Continue current regimen and recheck lipid panel spring 2025  6. JOSE (obstructive sleep apnea)  Assessment & Plan:  Home sleep study 5/4/2024 shows mild JOSE with AHI 9.9 and no obvious hypoxia.  Met with sleep medicine but could not tolerate CPAP.  Recommend oral appliance and wedge to raise head of bed.  7. Bee allergy status  Comments:  renewed epi pen per patient's request  Orders:  -     EPINEPHrine (EPIPEN) 0.3 mg/0.3 mL SOAJ; Inject 0.3 mL (0.3 mg total) into a muscle once for 1 dose       HPI  Rachel has a past medical history of palpitations, pre-diabetes, hyperlipidemia, GERD, and family history of premature CAD in her father and brother. History of incomplete RBBB on ECG.     Her father had an MI in his 40-50's and eventual CABG. Brother had heart disease and a stroke diagnosed in his 40's.     She was initially referred to cardiology in 2014 for cardiac work up due to episodes of chest tightness and heart pounding. She met with [facility], Dr. Wiggins in December 2014. She had undergone an exercise stress test 11/4/2014 with positive ECG changes with stress. Echocardiogram 11/5/14 showed LVEF 60% with no significant abnormality. Holter monitor showed rare PAC's/PVC's. A stress echo was ordered and completed 12/31/14 which showed normal exercise tolerance with no echo evidence of ischemia at 85% MPHR.      She was referred back to cardiology for continued symptoms and met in consultation with Dr. Peña at our office 6/16/2023. She reported episodes of chest pressure/tightness associated with heart pounding. She also reported a sharp mid sternal pain. ECG showed SR with IRBBB and a short PA interval with no delta waves noted. She reported a history of syncope x 2. Updated echocardiogram, stress echo, and 14 day ZIO  monitor were ordered.     ZIO -2023 showed 29 runs of SVT, longest 10.7 seconds with avg HR 80 bpm. Rare PAC's/PVC's. 13 triggers and 10 diary entries with most symptoms correlating with NSR, fluttering/racing episode correlated with the longest run of SVT.      Echo 2023 showed LVEF 60-65% with mild MR and no concerning findings.   Stress echo with Kevin protocol showed no echo evidence of ischemia at 10.1 mets and 93% MPHR.    She followed up most recently on 2024 at which time she reported her chest pain was resolved with omeprazole. Metoprolol succinate was increased to 25 mg BID for heart rate control.    2024: Rachel presents for routine follow up. She reports complete resolution of palpitations on the higher dose of metoprolol. She was seen at [facility] 2024 for an episode of mid sternal chest pain radiating to the back after her evening meal. Serial HS trop were negative. ECG showed Sinus rhythm with nonspecific ST/T wave abnormality and no significant change from prior. She started back on omeprazole and symptoms have not returned. She did undergo a home sleep study in May 2024 which showed mild JOSE with AHI 9.9. No significant hypoxia noted. She did meet with sleep medicine and was given an auto-titrating CPAP however she could absolutely not tolerate this. She has been dealing with grief over the death of her  granddaughter who passed from complications of polycystic kidney disease. Even still her HR's and BP have been well controlled. She denies any fatigue or profound bradycardia. She completed labs in April with Ha1c 6 and LDL 86.      Medical Problems       Problem List       Rapid heart rate    Precordial pain    Abnormal ECG during exercise stress test    Family history of coronary artery disease    Pure hypercholesterolemia    Absence of cervix    Anxiety as acute reaction to exceptional stress    Contact dermatitis and other eczema, due to unspecified cause     Mild tricuspid regurgitation    Family history of basal cell carcinoma    Family history of diabetes mellitus in first degree relative    History of decompression of both ulnar nerves    Incomplete right bundle branch block (RBBB) determined by electrocardiography    Menopause    Prediabetes    Shortened TN interval    Mild mitral regurgitation    Gastroesophageal reflux disease without esophagitis    Sleep disorder breathing    SVT (supraventricular tachycardia)    Family history of polycystic kidney disease, autosomal recessive    JOSE (obstructive sleep apnea)    Excessive daytime sleepiness    Snoring    Right foot pain    Metatarsalgia of right foot        Past Medical History:   Diagnosis Date    Allergic     Anxiety     Depression     Hypothyroidism     was previously on medication    Prediabetes     Syncope     Urge incontinence     Vitamin D deficiency      Social History     Socioeconomic History    Marital status: /Civil Union     Spouse name: Lincoln    Number of children: 6    Years of education: Not on file    Highest education level: Not on file   Occupational History    Not on file   Tobacco Use    Smoking status: Never    Smokeless tobacco: Never   Vaping Use    Vaping status: Never Used   Substance and Sexual Activity    Alcohol use: Yes     Comment: social    Drug use: Never    Sexual activity: Yes     Partners: Male     Birth control/protection: Female Sterilization     Comment: Defer   Other Topics Concern    Not on file   Social History Narrative    Not on file     Social Determinants of Health     Financial Resource Strain: Not on file   Food Insecurity: Not on file   Transportation Needs: Not on file   Physical Activity: Not on file   Stress: Not on file   Social Connections: Unknown (6/18/2024)    Received from [employer]    Social [employer]     How often do you feel lonely or isolated from those around you? (Adult - for ages 18 years and over): Not on file   Intimate Partner Violence:  Not on file   Housing Stability: Not on file      Family History   Problem Relation Age of Onset    Diabetes Mother     Hypertension Mother     Coronary artery disease Father         Dad  with MI 50's.  at age 78    Diabetes Father     Basal cell carcinoma Father     Parkinsonism Father     Hypertension Brother     Transient ischemic attack Brother     Heart disease Brother     Breast cancer Paternal Aunt      Past Surgical History:   Procedure Laterality Date    CHOLECYSTECTOMY      HYSTERECTOMY      TONSILLECTOMY      ULNAR TUNNEL RELEASE         Current Outpatient Medications:     Cyanocobalamin (VITAMIN B 12 PO), Take by mouth, Disp: , Rfl:     EPINEPHrine (EPIPEN) 0.3 mg/0.3 mL SOAJ, Inject 0.3 mL (0.3 mg total) into a muscle once for 1 dose, Disp: 0.6 mL, Rfl: 1    escitalopram (LEXAPRO) 20 mg tablet, TAKE ONE TABLET BY MOUTH EVERY DAY, Disp: 90 tablet, Rfl: 1    Magnesium 250 MG TABS, Take 2 tablets (500 mg total) by mouth daily at bedtime, Disp: 180 tablet, Rfl: 3    meloxicam (Mobic) 7.5 mg tablet, Take 1 tablet (7.5 mg total) by mouth daily, Disp: 20 tablet, Rfl: 0    metFORMIN (GLUCOPHAGE-XR) 500 mg 24 hr tablet, TAKE ONE TABLET BY MOUTH EVERY DAY WITH BREAKFAST, Disp: 90 tablet, Rfl: 3    metoprolol succinate (TOPROL-XL) 25 mg 24 hr tablet, Take 1 tablet (25 mg total) by mouth 2 (two) times a day, Disp: 180 tablet, Rfl: 3    omeprazole (PriLOSEC) 40 MG capsule, Take 1 capsule (40 mg total) by mouth daily, Disp: 90 capsule, Rfl: 0    oxybutynin (DITROPAN-XL) 5 mg 24 hr tablet, TAKE ONE TABLET BY MOUTH EVERY DAY, Disp: 90 tablet, Rfl: 1    rosuvastatin (CRESTOR) 5 mg tablet, TAKE ONE TABLET BY MOUTH EVERY DAY, Disp: 100 tablet, Rfl: 1    clobetasol (TEMOVATE) 0.05 % cream, Apply 1 application. topically as needed, Disp: , Rfl:   Allergies   Allergen Reactions    Bee Venom Swelling and Vomiting    Metformin Diarrhea    Other Blisters     Holter Monitor Stickers - Other reaction(s): very sensitive (even  to sensitive stickers) - causes welts        Labs:     Chemistry        Component Value Date/Time    K 4.0 04/16/2024 2053    K 4.8 08/23/2022 0810     04/16/2024 2053    CO2 26 04/16/2024 2053    CO2 28.3 08/23/2022 0810    BUN 14 04/16/2024 2053    BUN 10 08/23/2022 0810    CREATININE 0.73 04/16/2024 2053    CREATININE 0.79 08/23/2022 0810        Component Value Date/Time    CALCIUM 9.2 04/16/2024 2053    CALCIUM 9.6 08/23/2022 0810    ALKPHOS 46 09/11/2023 0708    ALKPHOS 49 08/23/2022 0810    AST 24 09/11/2023 0708    AST 18 08/23/2022 0810    ALT 19 09/11/2023 0708    ALT 12 08/23/2022 0810        Lipid panel 4/29/2024: C 162. T 74. H 61. L 86     Cardiac testing:  ECG 4/16/2024: Sinus bradycardia at 57 bpm with IRBBB and nonspecific ST/T wave abnormality. AZ interval 124.     Stress echo 7/28/2023:  Kevin protocol. 10.1 METs. 93% MPHR.  No echo evidence of ischemia.     Echocardiogram 7/28/2023:  LVEF 60-65%. Mild MR. LOCKE 6/21-7/5/2023:  Min HR 41. Max HR in . Avg HR 64 bpm.   29 runs of SVT with the longest run lasting 10.7 seconds with an average heart rate of 180 bpm.   Rare PAC's. Rare PVC's.   13 triggers and 10 diary entries.  Most symptoms correlated with normal sinus rhythm.   Fluttering/racing symptoms correlated with the longest run of SVT and one episode of possible atrial tachycardia and one episode of normal sinus rhythm with transition into possible atrial tachycardia.     ECG 6/16/2023: SB. Rate 56 bpm. Short AZ interval. Rightward axis. Nonspecific ST/T wave abnormality.    Review of Systems   Constitutional: Negative.   HENT: Negative.     Cardiovascular:  Negative for chest pain, dyspnea on exertion, irregular heartbeat, leg swelling, near-syncope, orthopnea and palpitations.   Respiratory:  Negative for cough and snoring.    Endocrine: Negative.    Skin: Negative.    Musculoskeletal: Negative.    Gastrointestinal: Negative.    Genitourinary: Negative.    Neurological:  "Negative.    Psychiatric/Behavioral: Negative.         Vitals:    07/26/24 0802   BP: 102/70   Pulse: 60   Temp: (!) 97.2 °F (36.2 °C)   SpO2: 98%     Vitals:    07/26/24 0802   Weight: 76.7 kg (169 lb)     Height: 5' 7\" (170.2 cm)   Body mass index is 26.47 kg/m².    Physical Exam  Vitals and nursing note reviewed.   Constitutional:       General: She is not in acute distress.     Appearance: She is well-developed. She is not diaphoretic.   HENT:      Head: Normocephalic and atraumatic.   Neck:      Thyroid: No thyromegaly.      Vascular: No carotid bruit or JVD.   Cardiovascular:      Rate and Rhythm: Normal rate and regular rhythm.      Pulses: Intact distal pulses.           Radial pulses are 2+ on the right side and 2+ on the left side.      Heart sounds: Normal heart sounds, S1 normal and S2 normal. No murmur heard.  Pulmonary:      Effort: Pulmonary effort is normal.      Breath sounds: Normal breath sounds.   Abdominal:      General: There is no distension.      Palpations: Abdomen is soft.      Tenderness: There is no abdominal tenderness.   Musculoskeletal:         General: Normal range of motion.      Cervical back: Normal range of motion and neck supple.   Lymphadenopathy:      Cervical: No cervical adenopathy.   Skin:     General: Skin is warm and dry.   Neurological:      Mental Status: She is alert and oriented to person, place, and time.      Cranial Nerves: No cranial nerve deficit.   Psychiatric:         Mood and Affect: Mood and affect normal.         Behavior: Behavior normal.                "

## 2024-07-26 ENCOUNTER — OFFICE VISIT (OUTPATIENT)
Dept: CARDIOLOGY CLINIC | Facility: CLINIC | Age: 59
End: 2024-07-26
Payer: COMMERCIAL

## 2024-07-26 VITALS
BODY MASS INDEX: 26.53 KG/M2 | SYSTOLIC BLOOD PRESSURE: 102 MMHG | OXYGEN SATURATION: 98 % | HEART RATE: 60 BPM | TEMPERATURE: 97.2 F | DIASTOLIC BLOOD PRESSURE: 70 MMHG | WEIGHT: 169 LBS | HEIGHT: 67 IN

## 2024-07-26 DIAGNOSIS — G47.33 OSA (OBSTRUCTIVE SLEEP APNEA): ICD-10-CM

## 2024-07-26 DIAGNOSIS — Z91.030 BEE ALLERGY STATUS: ICD-10-CM

## 2024-07-26 DIAGNOSIS — E78.00 PURE HYPERCHOLESTEROLEMIA: ICD-10-CM

## 2024-07-26 DIAGNOSIS — R94.31 ABNORMAL ECG DURING EXERCISE STRESS TEST: ICD-10-CM

## 2024-07-26 DIAGNOSIS — Z82.49 FAMILY HISTORY OF CORONARY ARTERY DISEASE: ICD-10-CM

## 2024-07-26 DIAGNOSIS — I47.10 SVT (SUPRAVENTRICULAR TACHYCARDIA): Primary | ICD-10-CM

## 2024-07-26 DIAGNOSIS — R94.31 SHORTENED PR INTERVAL: ICD-10-CM

## 2024-07-26 PROBLEM — G47.19 EXCESSIVE DAYTIME SLEEPINESS: Status: RESOLVED | Noted: 2024-05-07 | Resolved: 2024-07-26

## 2024-07-26 PROBLEM — G47.30 SLEEP DISORDER BREATHING: Status: RESOLVED | Noted: 2024-01-26 | Resolved: 2024-07-26

## 2024-07-26 PROBLEM — R06.83 SNORING: Status: RESOLVED | Noted: 2024-05-07 | Resolved: 2024-07-26

## 2024-07-26 PROBLEM — R00.0 RAPID HEART RATE: Status: RESOLVED | Noted: 2023-06-16 | Resolved: 2024-07-26

## 2024-07-26 PROCEDURE — 99214 OFFICE O/P EST MOD 30 MIN: CPT | Performed by: NURSE PRACTITIONER

## 2024-07-26 RX ORDER — EPINEPHRINE 0.3 MG/.3ML
0.3 INJECTION SUBCUTANEOUS ONCE
Qty: 0.6 ML | Refills: 1 | Status: SHIPPED | OUTPATIENT
Start: 2024-07-26 | End: 2024-07-26

## 2024-07-26 NOTE — ASSESSMENT & PLAN NOTE
Home sleep study 5/4/2024 shows mild JOSE with AHI 9.9 and no obvious hypoxia.  Met with sleep medicine but could not tolerate CPAP.  Recommend oral appliance and wedge to raise head of bed.

## 2024-07-26 NOTE — ASSESSMENT & PLAN NOTE
Referral to cardiology for intermittent rapid heart rates.   ECG with short RI interval (no delta waves).  Prior history of syncope x 2.   Echocardiogram and stress echo 7/28/2023 are unrevealing.  14 day ZIO monitor 6/15-7/5/2023 show 29 runs of SVT, longest 10.7 seconds. 13 triggers and 10 diary entries primarily correlate with NSR, with one episode of fluttering/racing correlating with the longest SVT episode.  She has since been started on metoprolol succinate 25 mg BID with complete symptom relief.  Continue magnesium to 500 mg daily.  JOSE testing reveals mild sleep apnea.

## 2024-07-26 NOTE — ASSESSMENT & PLAN NOTE
Lipid panel 4/29/2024: C 162. T 74. H 61. L 86.  Currently on rosuvastatin 5 mg daily.  Goal LDL < 100  Continue current regimen and recheck lipid panel spring 2025

## 2024-07-26 NOTE — ASSESSMENT & PLAN NOTE
Short VA interval noted on ECG 6/16/2023 (92 ms) without delta waves.   Prior episodes of syncope x 2.   Non ischemic stress echocardiogram 2014 and 7/28/2023.  Exercise stress test by cardiology report was positive for ischemia prompting stress echocardiogram (presumed false positive as imaging showed no ischemia).  Most recent ECG's with normal VA interval of 124.  Will continue to monitor.

## 2024-07-26 NOTE — PATIENT INSTRUCTIONS
Cholesterol is great. For glucose - add some stair time. Reduce sugars.  For sleep - check into oral appliance and bed adjustment

## 2024-08-23 ENCOUNTER — OFFICE VISIT (OUTPATIENT)
Dept: PODIATRY | Age: 59
End: 2024-08-23
Payer: COMMERCIAL

## 2024-08-23 VITALS
BODY MASS INDEX: 27.25 KG/M2 | RESPIRATION RATE: 18 BRPM | SYSTOLIC BLOOD PRESSURE: 116 MMHG | OXYGEN SATURATION: 96 % | TEMPERATURE: 97.8 F | HEART RATE: 66 BPM | WEIGHT: 173.6 LBS | HEIGHT: 67 IN | DIASTOLIC BLOOD PRESSURE: 72 MMHG

## 2024-08-23 DIAGNOSIS — M79.671 RIGHT FOOT PAIN: Primary | ICD-10-CM

## 2024-08-23 DIAGNOSIS — M77.41 METATARSALGIA OF RIGHT FOOT: ICD-10-CM

## 2024-08-23 PROCEDURE — 99212 OFFICE O/P EST SF 10 MIN: CPT | Performed by: STUDENT IN AN ORGANIZED HEALTH CARE EDUCATION/TRAINING PROGRAM

## 2024-08-23 NOTE — PROGRESS NOTES
Ambulatory Visit  Name: Rachel Novak      : 1965      MRN: [id number]  Encounter Provider: Bethanie Ta DPM  Encounter Date: 2024   Encounter department: [facility] [place]    Assessment & Plan   1. Right foot pain  2. Metatarsalgia of right foot      Prior Imaging   XR right foot WB 3v: No acute osseous abnormalities noted. Hts 2-5. Shortened 1st metatarsal with mild 1st MTPJ arthritis.       IMPRESSION:  Right foot submet 2/3 head pain. Differential diagnosis include metatarsalgia (2/3) due to biomechanical foot structure, bursitis, tendonitis     PLAN:  Right foot pain has resolved with CMO's w/ metatarsal pad  C/w stiff bottomed sneakers/shoes, achilles stretching  PT notes reviewed  F/u PRN    History of Present Illness     Rachel Novak is a 59 y.o. female who presents with right foot pain f/u. Went to PT and got CMOs. Pain has resolved.    Review of Systems   Constitutional:  Negative for activity change, chills and fever.   HENT: Negative.     Respiratory:  Negative for cough, chest tightness and shortness of breath.    Cardiovascular:  Negative for chest pain and leg swelling.   Endocrine: Negative.    Genitourinary: Negative.    Musculoskeletal:  Negative for gait problem.   Neurological:  Negative for numbness.   Psychiatric/Behavioral: Negative.  Negative for agitation and behavioral problems.        Objective     There were no vitals taken for this visit.    Physical Exam  Vitals and nursing note reviewed.   Constitutional:       General: She is not in acute distress.     Appearance: Normal appearance. She is well-developed.   HENT:      Head: Normocephalic.   Eyes:      Conjunctiva/sclera: Conjunctivae normal.   Cardiovascular:      Pulses: Normal pulses.   Pulmonary:      Effort: Pulmonary effort is normal.   Abdominal:      Palpations: Abdomen is soft.      Tenderness: There is no abdominal tenderness.   Musculoskeletal:         General:  Deformity (mild HTs 2-5 right foot. prominent right submet 2/3 heads.) present. No swelling (R submet 2/3 head scant) or tenderness (Right submet 2/3 heads and plantar plate region resolved).      Cervical back: Neck supple.      Comments: Gastroc equinus. Rectus foot type   Skin:     General: Skin is warm and dry.      Capillary Refill: Capillary refill takes less than 2 seconds.      Findings: No bruising, erythema or lesion.   Neurological:      General: No focal deficit present.      Mental Status: She is alert.      Comments: - N/T/B to right foot. Negative mulders click   Psychiatric:         Mood and Affect: Mood normal.       Administrative Statements

## 2024-08-27 ENCOUNTER — OFFICE VISIT (OUTPATIENT)
Dept: FAMILY MEDICINE CLINIC | Facility: CLINIC | Age: 59
End: 2024-08-27
Payer: COMMERCIAL

## 2024-08-27 VITALS
BODY MASS INDEX: 27.09 KG/M2 | HEART RATE: 59 BPM | OXYGEN SATURATION: 98 % | DIASTOLIC BLOOD PRESSURE: 70 MMHG | WEIGHT: 172.6 LBS | HEIGHT: 67 IN | SYSTOLIC BLOOD PRESSURE: 112 MMHG

## 2024-08-27 DIAGNOSIS — F43.0 ANXIETY AS ACUTE REACTION TO EXCEPTIONAL STRESS: ICD-10-CM

## 2024-08-27 DIAGNOSIS — F41.1 ANXIETY AS ACUTE REACTION TO EXCEPTIONAL STRESS: ICD-10-CM

## 2024-08-27 DIAGNOSIS — I47.10 SVT (SUPRAVENTRICULAR TACHYCARDIA): ICD-10-CM

## 2024-08-27 DIAGNOSIS — R73.03 PREDIABETES: ICD-10-CM

## 2024-08-27 DIAGNOSIS — Z83.3 FAMILY HISTORY OF DIABETES MELLITUS IN FIRST DEGREE RELATIVE: ICD-10-CM

## 2024-08-27 DIAGNOSIS — R94.31 SHORTENED PR INTERVAL: ICD-10-CM

## 2024-08-27 DIAGNOSIS — K21.9 GASTROESOPHAGEAL REFLUX DISEASE WITHOUT ESOPHAGITIS: ICD-10-CM

## 2024-08-27 DIAGNOSIS — Z82.49 FAMILY HISTORY OF CORONARY ARTERY DISEASE: ICD-10-CM

## 2024-08-27 DIAGNOSIS — Z76.89 ENCOUNTER TO ESTABLISH CARE: Primary | ICD-10-CM

## 2024-08-27 PROCEDURE — 99214 OFFICE O/P EST MOD 30 MIN: CPT | Performed by: FAMILY MEDICINE

## 2024-08-27 NOTE — PROGRESS NOTES
Subjective:    JENNIE Ramos is a 59 y.o. female here today for:  Chief Complaint   Patient presents with    Establish Care   .      ---Above per clinical staff & reviewed. ---  HPI:  [id number] here to establish  Last physical a little less than a year ago  Has been having some cardiac issues   Last seen by cardiology about 1 month ago for SVT, shortened TN interval  Has testing and all has been negative  Pt does have significant family history of coronary artery disease  Lives at home with , has MS  Has 4 kids in the area  Recently lost a granddaughter who was born with polycystic kidney disease  Has been working with [employer] for a year, prior was with [employer] for 5 years, and prior to that [facility]  Health maintenance reviewed with pt  Medications reviewed  Will see back in 3 months for well exam        The following portions of the patient's history were reviewed and updated as appropriate: allergies, current medications, past family history, past medical history, past social history, past surgical history and problem list.    Past Medical History:   Diagnosis Date    Allergic     Anxiety     Depression     Hypothyroidism     was previously on medication    Prediabetes     Syncope     Urge incontinence     Vitamin D deficiency        Past Surgical History:   Procedure Laterality Date    CHOLECYSTECTOMY      HYSTERECTOMY      TONSILLECTOMY      ULNAR TUNNEL RELEASE         Social History     Socioeconomic History    Marital status: /Civil Union     Spouse name: Lincoln    Number of children: 6    Years of education: None    Highest education level: None   Occupational History    None   Tobacco Use    Smoking status: Never    Smokeless tobacco: Never   Vaping Use    Vaping status: Never Used   Substance and Sexual Activity    Alcohol use: Yes     Comment: social    Drug use: Never    Sexual activity: Yes     Partners: Male     Birth control/protection: Female Sterilization     Comment: Defer   Other  Topics Concern    None   Social History Narrative    Lives at home with ,  10 years-  has MS, still working    3 kids all local, 8 grandkids, infant granddaughter passed away in march from polycystic kidney disease    Working at [employer] for a year     Social Determinants of Health     Financial Resource Strain: Not on file   Food Insecurity: Not on file   Transportation Needs: Not on file   Physical Activity: Not on file   Stress: Not on file   Social Connections: Unknown (6/18/2024)    Received from [facility]    Social Connections     How often do you feel lonely or isolated from those around you? (Adult - for ages 18 years and over): Not on file   Intimate Partner Violence: Not on file   Housing Stability: Not on file       Current Outpatient Medications   Medication Sig Dispense Refill    Cyanocobalamin (VITAMIN B 12 PO) Take by mouth      escitalopram (LEXAPRO) 20 mg tablet TAKE ONE TABLET BY MOUTH EVERY DAY 90 tablet 1    Magnesium 250 MG TABS Take 2 tablets (500 mg total) by mouth daily at bedtime (Patient taking differently: Take 250 mg by mouth daily at bedtime) 180 tablet 3    metFORMIN (GLUCOPHAGE-XR) 500 mg 24 hr tablet TAKE ONE TABLET BY MOUTH EVERY DAY WITH BREAKFAST 90 tablet 3    metoprolol succinate (TOPROL-XL) 25 mg 24 hr tablet Take 1 tablet (25 mg total) by mouth 2 (two) times a day 180 tablet 3    omeprazole (PriLOSEC) 40 MG capsule Take 1 capsule (40 mg total) by mouth daily (Patient taking differently: Take 40 mg by mouth daily as needed) 90 capsule 0    oxybutynin (DITROPAN-XL) 5 mg 24 hr tablet TAKE ONE TABLET BY MOUTH EVERY DAY 90 tablet 1    rosuvastatin (CRESTOR) 5 mg tablet TAKE ONE TABLET BY MOUTH EVERY  tablet 1    clobetasol (TEMOVATE) 0.05 % cream Apply 1 application. topically as needed      EPINEPHrine (EPIPEN) 0.3 mg/0.3 mL SOAJ Inject 0.3 mL (0.3 mg total) into a muscle once for 1 dose 0.6 mL 1     No current facility-administered medications for this  "visit.        Review of Systems   Constitutional: Negative.  Negative for chills and fever.   HENT: Negative.  Negative for ear pain and sore throat.    Eyes:  Negative for pain and visual disturbance.   Respiratory: Negative.  Negative for cough and shortness of breath.    Cardiovascular: Negative.  Negative for chest pain and palpitations.   Gastrointestinal: Negative.  Negative for abdominal pain and vomiting.   Genitourinary: Negative.  Negative for dysuria and hematuria.   Musculoskeletal:  Negative for arthralgias and back pain.   Skin:  Negative for color change and rash.   Neurological: Negative.  Negative for seizures and syncope.   Psychiatric/Behavioral:  The patient is nervous/anxious.    All other systems reviewed and are negative.       Objective:    /70 (BP Location: Left arm, Patient Position: Sitting, Cuff Size: Adult)   Pulse 59   Ht 5' 7\" (1.702 m)   Wt 78.3 kg (172 lb 9.6 oz)   SpO2 98%   BMI 27.03 kg/m²   Wt Readings from Last 3 Encounters:   08/27/24 78.3 kg (172 lb 9.6 oz)   08/23/24 78.7 kg (173 lb 9.6 oz)   07/26/24 76.7 kg (169 lb)     BP Readings from Last 3 Encounters:   08/27/24 112/70   08/23/24 116/72   07/26/24 102/70       Lab Results   Component Value Date    WBC 7.04 04/16/2024    HGB 11.6 04/16/2024    HCT 33.6 (L) 04/16/2024     04/16/2024    TRIG 74 04/29/2024    HDL 61 04/29/2024    LDLDIRECT 85 09/11/2023    ALT 19 09/11/2023    AST 24 09/11/2023    K 4.0 04/16/2024     04/16/2024    CREATININE 0.73 04/16/2024    BUN 14 04/16/2024    CO2 26 04/16/2024    GLUF 104 (H) 01/19/2024    HGBA1C 6.0 (H) 04/29/2024       Physical Exam  Vitals and nursing note reviewed.   Constitutional:       Appearance: Normal appearance. She is well-developed.   HENT:      Head: Normocephalic and atraumatic.   Eyes:      Pupils: Pupils are equal, round, and reactive to light.   Cardiovascular:      Rate and Rhythm: Normal rate and regular rhythm.      Heart sounds: No murmur " heard.  Pulmonary:      Effort: Pulmonary effort is normal.      Breath sounds: Normal breath sounds.   Musculoskeletal:      Cervical back: Normal range of motion and neck supple.   Skin:     General: Skin is warm.      Capillary Refill: Capillary refill takes less than 2 seconds.   Neurological:      General: No focal deficit present.      Mental Status: She is alert and oriented to person, place, and time.      Cranial Nerves: No cranial nerve deficit.   Psychiatric:         Mood and Affect: Mood normal.         Behavior: Behavior normal.         Thought Content: Thought content normal.                       Assessment/Plan:   Rachel was seen today for establish care.    Diagnoses and all orders for this visit:    Encounter to establish care    Prediabetes    SVT (supraventricular tachycardia)    Shortened NE interval    Gastroesophageal reflux disease without esophagitis    Anxiety as acute reaction to exceptional stress    Family history of coronary artery disease    Family history of diabetes mellitus in first degree relative      Return in about 3 months (around 11/27/2024) for Annual physical.  There are no Patient Instructions on file for this visit.

## 2024-09-16 DIAGNOSIS — R73.03 PREDIABETES: ICD-10-CM

## 2024-09-16 DIAGNOSIS — F43.0 ANXIETY AS ACUTE REACTION TO EXCEPTIONAL STRESS: ICD-10-CM

## 2024-09-16 DIAGNOSIS — F41.1 ANXIETY AS ACUTE REACTION TO EXCEPTIONAL STRESS: ICD-10-CM

## 2024-09-16 DIAGNOSIS — N32.81 OAB (OVERACTIVE BLADDER): ICD-10-CM

## 2024-09-16 NOTE — TELEPHONE ENCOUNTER
Medication: metFORMIN (GLUCOPHAGE-XR) 500 mg 24 hr tablet     Dose/Frequency: TAKE ONE TABLET BY MOUTH EVERY DAY WITH BREAKFAST     Quantity: 90    Pharmacy: [address] [facility]      Office:   [x] PCP/Provider -   [] Speciality/Provider -     Does the patient have enough for 3 days?   [] Yes   [] No - Send as HP to POD       Medication:     escitalopram (LEXAPRO) 20 mg tablet       Dose/Frequency:     Take 2 tablets (500 mg total) by mouth daily at bedtime       Quantity: 180    Pharmacy:  [address] [facility]     Office:   [x] PCP/Provider -   [] Speciality/Provider -     Does the patient have enough for 3 days?   [] Yes   [] No - Send as HP to POD      Medication: oxybutynin (DITROPAN-XL) 5 mg 24 hr tablet     Dose/Frequency: TAKE ONE TABLET BY MOUTH EVERY DAY     Quantity: 90    Pharmacy: [address] [facility]       Office:   [x] PCP/Provider -   [] Speciality/Provider -     Does the patient have enough for 3 days?   [] Yes   [] No - Send as HP to POD

## 2024-09-18 RX ORDER — ESCITALOPRAM OXALATE 20 MG/1
20 TABLET ORAL DAILY
Qty: 90 TABLET | Refills: 1 | OUTPATIENT
Start: 2024-09-18

## 2024-09-18 RX ORDER — OXYBUTYNIN CHLORIDE 5 MG/1
5 TABLET, EXTENDED RELEASE ORAL DAILY
Qty: 90 TABLET | Refills: 1 | OUTPATIENT
Start: 2024-09-18

## 2024-09-18 RX ORDER — METFORMIN HCL 500 MG
500 TABLET, EXTENDED RELEASE 24 HR ORAL
Qty: 90 TABLET | Refills: 3 | OUTPATIENT
Start: 2024-09-18

## 2024-10-07 ENCOUNTER — OFFICE VISIT (OUTPATIENT)
Dept: PHYSICAL THERAPY | Facility: CLINIC | Age: 59
End: 2024-10-07

## 2024-10-07 DIAGNOSIS — M77.41 METATARSALGIA OF RIGHT FOOT: ICD-10-CM

## 2024-10-07 DIAGNOSIS — M79.671 RIGHT FOOT PAIN: Primary | ICD-10-CM

## 2024-10-08 NOTE — PROGRESS NOTES
Patient seen for orthotics adjustment evaluation as she has been starting to have similar symptoms to previous issues at the 5th MTP on R foot. Appears to be overloading lateral column during gait on R side, will adjust orthotics to encourage improved loading of foot during gait.

## 2024-10-18 ENCOUNTER — ANESTHESIA (OUTPATIENT)
Dept: ANESTHESIOLOGY | Facility: HOSPITAL | Age: 59
End: 2024-10-18

## 2024-10-18 ENCOUNTER — ANESTHESIA EVENT (OUTPATIENT)
Dept: ANESTHESIOLOGY | Facility: HOSPITAL | Age: 59
End: 2024-10-18

## 2024-10-18 NOTE — ANESTHESIA PREPROCEDURE EVALUATION
"Procedure:  PRE-OP ONLY    Relevant Problems   CARDIO   (+) Mild mitral regurgitation   (+) Precordial pain   (+) Pure hypercholesterolemia   (+) SVT (supraventricular tachycardia) (HCC)      GI/HEPATIC   (+) Gastroesophageal reflux disease without esophagitis      NEURO/PSYCH   (+) Anxiety as acute reaction to exceptional stress      PULMONARY   (+) JOSE (obstructive sleep apnea)      EKG 4/16/2024:  Sinus bradycardia  Incomplete right bundle branch block  Nonspecific ST and T wave abnormality  Abnormal ECG  When compared with ECG of 16-APR-2024 20:36, (unconfirmed)  Nonspecific T wave abnormality now evident in Inferior leads  Nonspecific T wave abnormality, worse in Anterior leads    TTE 7/28/2023:    Left Ventricle: Left ventricular cavity size is normal. Wall thickness is normal. The left ventricular ejection fraction is 60-64%. Systolic function is normal. Wall motion is normal.    Right Ventricle: Systolic function is normal.    Mitral Valve: There is mild regurgitation.    IVC/SVC: The inferior vena cava size is 18.0 mm. The right atrial pressure is estimated at 3.0 mmHg. The inferior vena cava is normal in size. Respirophasic changes were normal.    Pulmonary Artery: The pulmonary artery systolic pressure is normal.    Lab Results   Component Value Date    WBC 7.04 04/16/2024    HGB 11.6 04/16/2024    HCT 33.6 (L) 04/16/2024    MCV 94 04/16/2024     04/16/2024     Lab Results   Component Value Date    SODIUM 137 04/16/2024    K 4.0 04/16/2024     04/16/2024    CO2 26 04/16/2024    BUN 14 04/16/2024    CREATININE 0.73 04/16/2024    GLUC 95 04/16/2024    CALCIUM 9.2 04/16/2024     No results found for: \"INR\", \"PROTIME\"  Lab Results   Component Value Date    HGBA1C 6.0 (H) 04/29/2024                 Anesthesia Plan  ASA Score- 2     Anesthesia Type- IV sedation with anesthesia with ASA Monitors.         Additional Monitors:     Airway Plan:            Plan Factors-    Chart reviewed. EKG reviewed.  " Existing labs reviewed. Patient summary reviewed.                  Induction- intravenous.    Postoperative Plan-         Informed Consent-

## 2024-10-21 ENCOUNTER — ANESTHESIA (OUTPATIENT)
Dept: GASTROENTEROLOGY | Facility: HOSPITAL | Age: 59
End: 2024-10-21
Payer: COMMERCIAL

## 2024-10-21 ENCOUNTER — HOSPITAL ENCOUNTER (OUTPATIENT)
Dept: GASTROENTEROLOGY | Facility: HOSPITAL | Age: 59
Setting detail: OUTPATIENT SURGERY
Discharge: HOME | End: 2024-10-21
Attending: HOSPITALIST | Admitting: HOSPITALIST
Payer: COMMERCIAL

## 2024-10-21 ENCOUNTER — ANESTHESIA EVENT (OUTPATIENT)
Dept: GASTROENTEROLOGY | Facility: HOSPITAL | Age: 59
End: 2024-10-21
Payer: COMMERCIAL

## 2024-10-21 VITALS
HEART RATE: 60 BPM | HEIGHT: 67 IN | DIASTOLIC BLOOD PRESSURE: 59 MMHG | SYSTOLIC BLOOD PRESSURE: 107 MMHG | OXYGEN SATURATION: 96 % | WEIGHT: 172 LBS | BODY MASS INDEX: 27 KG/M2 | TEMPERATURE: 97.4 F | RESPIRATION RATE: 20 BRPM

## 2024-10-21 DIAGNOSIS — Z86.0100 HISTORY OF COLONIC POLYPS: ICD-10-CM

## 2024-10-21 LAB — GLUCOSE SERPL-MCNC: 101 MG/DL (ref 65–140)

## 2024-10-21 PROCEDURE — 82948 REAGENT STRIP/BLOOD GLUCOSE: CPT

## 2024-10-21 RX ORDER — SODIUM CHLORIDE, SODIUM LACTATE, POTASSIUM CHLORIDE, CALCIUM CHLORIDE 600; 310; 30; 20 MG/100ML; MG/100ML; MG/100ML; MG/100ML
INJECTION, SOLUTION INTRAVENOUS CONTINUOUS PRN
Status: DISCONTINUED | OUTPATIENT
Start: 2024-10-21 | End: 2024-10-21

## 2024-10-21 RX ORDER — PROPOFOL 10 MG/ML
INJECTION, EMULSION INTRAVENOUS AS NEEDED
Status: DISCONTINUED | OUTPATIENT
Start: 2024-10-21 | End: 2024-10-21

## 2024-10-21 RX ORDER — LIDOCAINE HYDROCHLORIDE 10 MG/ML
INJECTION, SOLUTION EPIDURAL; INFILTRATION; INTRACAUDAL; PERINEURAL AS NEEDED
Status: DISCONTINUED | OUTPATIENT
Start: 2024-10-21 | End: 2024-10-21

## 2024-10-21 RX ADMIN — SODIUM CHLORIDE, SODIUM LACTATE, POTASSIUM CHLORIDE, AND CALCIUM CHLORIDE: .6; .31; .03; .02 INJECTION, SOLUTION INTRAVENOUS at 09:16

## 2024-10-21 RX ADMIN — Medication 40 MG: at 09:20

## 2024-10-21 RX ADMIN — LIDOCAINE HYDROCHLORIDE 50 MG: 10 INJECTION, SOLUTION EPIDURAL; INFILTRATION; INTRACAUDAL; PERINEURAL at 09:17

## 2024-10-21 RX ADMIN — PROPOFOL 140 MCG/KG/MIN: 10 INJECTION, EMULSION INTRAVENOUS at 09:22

## 2024-10-21 RX ADMIN — PROPOFOL 50 MG: 10 INJECTION, EMULSION INTRAVENOUS at 09:20

## 2024-10-21 RX ADMIN — PROPOFOL 100 MG: 10 INJECTION, EMULSION INTRAVENOUS at 09:17

## 2024-10-21 RX ADMIN — PROPOFOL 50 MG: 10 INJECTION, EMULSION INTRAVENOUS at 09:18

## 2024-10-21 NOTE — ANESTHESIA POSTPROCEDURE EVALUATION
Post-Op Assessment Note    CV Status:  Stable    Pain management: adequate       Mental Status:  Alert and awake   Hydration Status:  Euvolemic   PONV Controlled:  Controlled   Airway Patency:  Patent     Post Op Vitals Reviewed: Yes    No anethesia notable event occurred.    Staff: Anesthesiologist           Last Filed PACU Vitals:  Vitals Value Taken Time   Temp 97.4 °F (36.3 °C) 10/21/24 0947   Pulse 66 10/21/24 0947   /56 10/21/24 0947   Resp 19 10/21/24 0947   SpO2 99 % 10/21/24 0947       Modified Juliano:  Activity: 2 (10/21/2024 10:03 AM)  Respiration: 2 (10/21/2024 10:03 AM)  Circulation: 2 (10/21/2024 10:03 AM)  Consciousness: 2 (10/21/2024 10:03 AM)  Oxygen Saturation: 2 (10/21/2024 10:03 AM)  Modified Juliano Score: 10 (10/21/2024 10:03 AM)

## 2024-10-21 NOTE — H&P
Procedure(s):  Colonoscopy with indication(s) of CRC screening. Hx of colon polyps  Esophagogastroduodenuoscopy with the indication(s) of      Vitals:    10/21/24 0850   BP: 125/57   Pulse: 98   Resp: 18   Temp: 98.2 °F (36.8 °C)   SpO2: 98%       Physical Exam:  Physical Exam  Eyes:      Conjunctiva/sclera: Conjunctivae normal.   Cardiovascular:      Rate and Rhythm: Normal rate.   Pulmonary:      Effort: Pulmonary effort is normal.   Abdominal:      Palpations: Abdomen is soft.   Neurological:      General: No focal deficit present.      Mental Status: She is alert.   Psychiatric:         Mood and Affect: Mood normal.              Endoscopy Pre-Procedure Assessment:  Prior to the procedure, the patient is identified.  The patient's history, medications, and allergies have been reviewed.  The patient is competent.     Consent:    We have discussed the procedure in detail. We reviewed risks, benefits and alternative as well as potentional complications including and not limited to medication side effect , infection, bleeding, perforation and the potential need for surgery, ICU admission, CPR, as well as the need for blood product transfusion. Patient verbalized understanding and agreement. All patient questions were answered.     After reviewed the risks and benefits, the patient is deemed in satisfactory condition to undergo the procedure.  The anesthesia plan is to use monitored anesthesia care (MAC).      10/21/24

## 2024-10-21 NOTE — ANESTHESIA PREPROCEDURE EVALUATION
"Procedure:  COLONOSCOPY    Relevant Problems   ANESTHESIA (within normal limits)      CARDIO   (+) Mild mitral regurgitation   (+) Mild tricuspid regurgitation   (+) Precordial pain   (+) Pure hypercholesterolemia   (+) SVT (supraventricular tachycardia) (HCC)      ENDO (within normal limits)      GI/HEPATIC   (+) Gastroesophageal reflux disease without esophagitis      /RENAL (within normal limits)      HEMATOLOGY (within normal limits)      NEURO/PSYCH   (+) Anxiety as acute reaction to exceptional stress      PULMONARY   (+) JOSE (obstructive sleep apnea)      Other   (+) Prediabetes      EKG 4/16/2024:  Sinus bradycardia  Incomplete right bundle branch block  Nonspecific ST and T wave abnormality  Abnormal ECG  When compared with ECG of 16-APR-2024 20:36, (unconfirmed)  Nonspecific T wave abnormality now evident in Inferior leads  Nonspecific T wave abnormality, worse in Anterior leads    TTE 7/28/2023:    Left Ventricle: Left ventricular cavity size is normal. Wall thickness is normal. The left ventricular ejection fraction is 60-64%. Systolic function is normal. Wall motion is normal.    Right Ventricle: Systolic function is normal.    Mitral Valve: There is mild regurgitation.    IVC/SVC: The inferior vena cava size is 18.0 mm. The right atrial pressure is estimated at 3.0 mmHg. The inferior vena cava is normal in size. Respirophasic changes were normal.    Pulmonary Artery: The pulmonary artery systolic pressure is normal.    Lab Results   Component Value Date    WBC 7.04 04/16/2024    HGB 11.6 04/16/2024    HCT 33.6 (L) 04/16/2024    MCV 94 04/16/2024     04/16/2024     Lab Results   Component Value Date    SODIUM 137 04/16/2024    K 4.0 04/16/2024     04/16/2024    CO2 26 04/16/2024    BUN 14 04/16/2024    CREATININE 0.73 04/16/2024    GLUC 95 04/16/2024    CALCIUM 9.2 04/16/2024     No results found for: \"INR\", \"PROTIME\"  Lab Results   Component Value Date    HGBA1C 6.0 (H) 04/29/2024    "         Physical Exam    Airway    Mallampati score: II  TM Distance: >3 FB  Neck ROM: full     Dental   No notable dental hx     Cardiovascular  Cardiovascular exam normal    Pulmonary  Pulmonary exam normal     Other Findings  post-pubertal.      Anesthesia Plan  ASA Score- 2     Anesthesia Type- IV sedation with anesthesia with ASA Monitors.         Additional Monitors:     Airway Plan:            Plan Factors-Exercise tolerance (METS): >4 METS.    Chart reviewed. EKG reviewed.  Existing labs reviewed. Patient summary reviewed.                  Induction- intravenous.    Postoperative Plan-         Informed Consent- Anesthetic plan and risks discussed with patient.  I personally reviewed this patient with the CRNA. Discussed and agreed on the Anesthesia Plan with the CRNA..

## 2024-10-21 NOTE — ANESTHESIA POSTPROCEDURE EVALUATION
Post-Op Assessment Note    CV Status:  Stable    Pain management: adequate       Mental Status:  Sleepy   Hydration Status:  Euvolemic   PONV Controlled:  Controlled   Airway Patency:  Patent     Post Op Vitals Reviewed: Yes    No anethesia notable event occurred.    Staff: CRNA           Last Filed PACU Vitals:  Vitals Value Taken Time   Temp 98.3    Pulse 78    /67    Resp 18    SpO2 98%        Modified Juliano:  Activity: 2 (10/21/2024  8:51 AM)  Respiration: 2 (10/21/2024  8:51 AM)  Circulation: 2 (10/21/2024  8:51 AM)  Consciousness: 2 (10/21/2024  8:51 AM)  Oxygen Saturation: 2 (10/21/2024  8:51 AM)  Modified Juliano Score: 10 (10/21/2024  8:51 AM)

## 2024-10-28 ENCOUNTER — OFFICE VISIT (OUTPATIENT)
Dept: PHYSICAL THERAPY | Facility: CLINIC | Age: 59
End: 2024-10-28
Payer: COMMERCIAL

## 2024-10-28 DIAGNOSIS — M79.671 RIGHT FOOT PAIN: Primary | ICD-10-CM

## 2024-10-28 DIAGNOSIS — M77.41 METATARSALGIA OF RIGHT FOOT: ICD-10-CM

## 2024-10-28 PROCEDURE — 97110 THERAPEUTIC EXERCISES: CPT

## 2024-10-28 NOTE — PROGRESS NOTES
Custom orthotics re-fitted to patients shoe following modifications. Added R lateral rearfoot post and sesamoid cutout on R to promote better force transfer into great toe during terminal gait. Patient educated on appropriate break in period with modifications as well as desired gait mechanics. Discussed potential for soreness of great toe flexor tendon c/ break in period and reviewed great toe flexion exercises to prevent tendinopathy. Patient will follow up if any future problems arise.     Jelena Aviles, PT  10/28/2024  7:39 AM

## 2024-11-26 ENCOUNTER — OFFICE VISIT (OUTPATIENT)
Dept: FAMILY MEDICINE CLINIC | Facility: CLINIC | Age: 59
End: 2024-11-26
Payer: COMMERCIAL

## 2024-11-26 VITALS
DIASTOLIC BLOOD PRESSURE: 72 MMHG | SYSTOLIC BLOOD PRESSURE: 110 MMHG | TEMPERATURE: 97.9 F | WEIGHT: 173.4 LBS | HEART RATE: 68 BPM | BODY MASS INDEX: 27.21 KG/M2 | OXYGEN SATURATION: 99 % | HEIGHT: 67 IN

## 2024-11-26 DIAGNOSIS — Z00.00 ANNUAL PHYSICAL EXAM: Primary | ICD-10-CM

## 2024-11-26 DIAGNOSIS — Z13.6 SCREENING FOR CARDIOVASCULAR CONDITION: ICD-10-CM

## 2024-11-26 DIAGNOSIS — Z12.31 ENCOUNTER FOR SCREENING MAMMOGRAM FOR BREAST CANCER: ICD-10-CM

## 2024-11-26 DIAGNOSIS — I47.10 SVT (SUPRAVENTRICULAR TACHYCARDIA) (HCC): ICD-10-CM

## 2024-11-26 DIAGNOSIS — R73.03 PREDIABETES: ICD-10-CM

## 2024-11-26 DIAGNOSIS — Z82.49 FAMILY HISTORY OF CORONARY ARTERY DISEASE: ICD-10-CM

## 2024-11-26 DIAGNOSIS — F41.1 ANXIETY AS ACUTE REACTION TO EXCEPTIONAL STRESS: ICD-10-CM

## 2024-11-26 DIAGNOSIS — N32.81 OAB (OVERACTIVE BLADDER): ICD-10-CM

## 2024-11-26 DIAGNOSIS — R94.31 SHORTENED PR INTERVAL: ICD-10-CM

## 2024-11-26 DIAGNOSIS — F43.0 ANXIETY AS ACUTE REACTION TO EXCEPTIONAL STRESS: ICD-10-CM

## 2024-11-26 PROCEDURE — 99396 PREV VISIT EST AGE 40-64: CPT | Performed by: FAMILY MEDICINE

## 2024-11-26 PROCEDURE — 99214 OFFICE O/P EST MOD 30 MIN: CPT | Performed by: FAMILY MEDICINE

## 2024-11-26 RX ORDER — OXYBUTYNIN CHLORIDE 5 MG/1
5 TABLET, EXTENDED RELEASE ORAL DAILY
Qty: 90 TABLET | Refills: 3 | Status: SHIPPED | OUTPATIENT
Start: 2024-11-26

## 2024-11-26 RX ORDER — METFORMIN HYDROCHLORIDE 500 MG/1
500 TABLET, EXTENDED RELEASE ORAL
Qty: 90 TABLET | Refills: 3 | Status: SHIPPED | OUTPATIENT
Start: 2024-11-26

## 2024-11-26 RX ORDER — ESCITALOPRAM OXALATE 20 MG/1
20 TABLET ORAL DAILY
Qty: 90 TABLET | Refills: 3 | Status: SHIPPED | OUTPATIENT
Start: 2024-11-26

## 2024-11-26 NOTE — PATIENT INSTRUCTIONS
"Patient Education     Routine physical for adults   The Basics   Written by the doctors and editors at [facility]   What is a physical? -- A physical is a routine visit, or \"check-up,\" with your doctor. You might also hear it called a \"wellness visit\" or \"preventive visit.\"  During each visit, the doctor will:   Ask about your physical and mental health   Ask about your habits, behaviors, and lifestyle   Do an exam   Give you vaccines if needed   Talk to you about any medicines you take   Give advice about your health   Answer your questions  Getting regular check-ups is an important part of taking care of your health. It can help your doctor find and treat any problems you have. But it's also important for preventing health problems.  A routine physical is different from a \"sick visit.\" A sick visit is when you see a doctor because of a health concern or problem. Since physicals are scheduled ahead of time, you can think about what you want to ask the doctor.  How often should I get a physical? -- It depends on your age and health. In general, for people age 21 years and older:   If you are younger than 50 years, you might be able to get a physical every 3 years.   If you are 50 years or older, your doctor might recommend a physical every year.  If you have an ongoing health condition, like diabetes or high blood pressure, your doctor will probably want to see you more often.  What happens during a physical? -- In general, each visit will include:   Physical exam - The doctor or nurse will check your height, weight, heart rate, and blood pressure. They will also look at your eyes and ears. They will ask about how you are feeling and whether you have any symptoms that bother you.   Medicines - It's a good idea to bring a list of all the medicines you take to each doctor visit. Your doctor will talk to you about your medicines and answer any questions. Tell them if you are having any side effects that bother you. You " "should also tell them if you are having trouble paying for any of your medicines.   Habits and behaviors - This includes:   Your diet   Your exercise habits   Whether you smoke, drink alcohol, or use drugs   Whether you are sexually active   Whether you feel safe at home  Your doctor will talk to you about things you can do to improve your health and lower your risk of health problems. They will also offer help and support. For example, if you want to quit smoking, they can give you advice and might prescribe medicines. If you want to improve your diet or get more physical activity, they can help you with this, too.   Lab tests, if needed - The tests you get will depend on your age and situation. For example, your doctor might want to check your:   Cholesterol   Blood sugar   Iron level   Vaccines - The recommended vaccines will depend on your age, health, and what vaccines you already had. Vaccines are very important because they can prevent certain serious or deadly infections.   Discussion of screening - \"Screening\" means checking for diseases or other health problems before they cause symptoms. Your doctor can recommend screening based on your age, risk, and preferences. This might include tests to check for:   Cancer, such as breast, prostate, cervical, ovarian, colorectal, prostate, lung, or skin cancer   Sexually transmitted infections, such as chlamydia and gonorrhea   Mental health conditions like depression and anxiety  Your doctor will talk to you about the different types of screening tests. They can help you decide which screenings to have. They can also explain what the results might mean.   Answering questions - The physical is a good time to ask the doctor or nurse questions about your health. If needed, they can refer you to other doctors or specialists, too.  Adults older than 65 years often need other care, too. As you get older, your doctor will talk to you about:   How to prevent falling at " home   Hearing or vision tests   Memory testing   How to take your medicines safely   Making sure that you have the help and support you need at home  All topics are updated as new evidence becomes available and our peer review process is complete.  This topic retrieved from [employer] on: May 02, 2024.  Topic 892607 Version 1.0  Release: 32.4.3 - C32.122  © 2024 UpToDate, Inc. and/or its affiliates. All rights reserved.  Consumer Information Use and Disclaimer   Disclaimer: This generalized information is a limited summary of diagnosis, treatment, and/or medication information. It is not meant to be comprehensive and should be used as a tool to help the user understand and/or assess potential diagnostic and treatment options. It does NOT include all information about conditions, treatments, medications, side effects, or risks that may apply to a specific patient. It is not intended to be medical advice or a substitute for the medical advice, diagnosis, or treatment of a health care provider based on the health care provider's examination and assessment of a patient's specific and unique circumstances. Patients must speak with a health care provider for complete information about their health, medical questions, and treatment options, including any risks or benefits regarding use of medications. This information does not endorse any treatments or medications as safe, effective, or approved for treating a specific patient. Up[employer], Inc. and its affiliates disclaim any warranty or liability relating to this information or the use thereof.The use of this information is governed by the Terms of Use, available at https://www.w[employer]Paduwer.com/en/know/clinical-effectiveness-terms. 2024© UpToDate, Inc. and its affiliates and/or licensors. All rights reserved.  Copyright   © 2024 Up[employer], Inc. and/or its affiliates. All rights reserved.    Patient Education     Routine physical for adults   The Basics   Written by the  "doctors and editors at [facility]   What is a physical? -- A physical is a routine visit, or \"check-up,\" with your doctor. You might also hear it called a \"wellness visit\" or \"preventive visit.\"  During each visit, the doctor will:   Ask about your physical and mental health   Ask about your habits, behaviors, and lifestyle   Do an exam   Give you vaccines if needed   Talk to you about any medicines you take   Give advice about your health   Answer your questions  Getting regular check-ups is an important part of taking care of your health. It can help your doctor find and treat any problems you have. But it's also important for preventing health problems.  A routine physical is different from a \"sick visit.\" A sick visit is when you see a doctor because of a health concern or problem. Since physicals are scheduled ahead of time, you can think about what you want to ask the doctor.  How often should I get a physical? -- It depends on your age and health. In general, for people age 21 years and older:   If you are younger than 50 years, you might be able to get a physical every 3 years.   If you are 50 years or older, your doctor might recommend a physical every year.  If you have an ongoing health condition, like diabetes or high blood pressure, your doctor will probably want to see you more often.  What happens during a physical? -- In general, each visit will include:   Physical exam - The doctor or nurse will check your height, weight, heart rate, and blood pressure. They will also look at your eyes and ears. They will ask about how you are feeling and whether you have any symptoms that bother you.   Medicines - It's a good idea to bring a list of all the medicines you take to each doctor visit. Your doctor will talk to you about your medicines and answer any questions. Tell them if you are having any side effects that bother you. You should also tell them if you are having trouble paying for any of your " "medicines.   Habits and behaviors - This includes:   Your diet   Your exercise habits   Whether you smoke, drink alcohol, or use drugs   Whether you are sexually active   Whether you feel safe at home  Your doctor will talk to you about things you can do to improve your health and lower your risk of health problems. They will also offer help and support. For example, if you want to quit smoking, they can give you advice and might prescribe medicines. If you want to improve your diet or get more physical activity, they can help you with this, too.   Lab tests, if needed - The tests you get will depend on your age and situation. For example, your doctor might want to check your:   Cholesterol   Blood sugar   Iron level   Vaccines - The recommended vaccines will depend on your age, health, and what vaccines you already had. Vaccines are very important because they can prevent certain serious or deadly infections.   Discussion of screening - \"Screening\" means checking for diseases or other health problems before they cause symptoms. Your doctor can recommend screening based on your age, risk, and preferences. This might include tests to check for:   Cancer, such as breast, prostate, cervical, ovarian, colorectal, prostate, lung, or skin cancer   Sexually transmitted infections, such as chlamydia and gonorrhea   Mental health conditions like depression and anxiety  Your doctor will talk to you about the different types of screening tests. They can help you decide which screenings to have. They can also explain what the results might mean.   Answering questions - The physical is a good time to ask the doctor or nurse questions about your health. If needed, they can refer you to other doctors or specialists, too.  Adults older than 65 years often need other care, too. As you get older, your doctor will talk to you about:   How to prevent falling at home   Hearing or vision tests   Memory testing   How to take your " medicines safely   Making sure that you have the help and support you need at home  All topics are updated as new evidence becomes available and our peer review process is complete.  This topic retrieved from [employer] on: May 02, 2024.  Topic 742508 Version 1.0  Release: 32.4.3 - C32.122  © 2024 UpToDate, Inc. and/or its affiliates. All rights reserved.  Consumer Information Use and Disclaimer   Disclaimer: This generalized information is a limited summary of diagnosis, treatment, and/or medication information. It is not meant to be comprehensive and should be used as a tool to help the user understand and/or assess potential diagnostic and treatment options. It does NOT include all information about conditions, treatments, medications, side effects, or risks that may apply to a specific patient. It is not intended to be medical advice or a substitute for the medical advice, diagnosis, or treatment of a health care provider based on the health care provider's examination and assessment of a patient's specific and unique circumstances. Patients must speak with a health care provider for complete information about their health, medical questions, and treatment options, including any risks or benefits regarding use of medications. This information does not endorse any treatments or medications as safe, effective, or approved for treating a specific patient. Up[employer], Inc. and its affiliates disclaim any warranty or liability relating to this information or the use thereof.The use of this information is governed by the Terms of Use, available at https://www.wolters24 Media Networkuwer.com/en/know/clinical-effectiveness-terms. 2024© Up[employer]Date, Inc. and its affiliates and/or licensors. All rights reserved.  Copyright   © 2024 Up[employer], Inc. and/or its affiliates. All rights reserved.

## 2024-11-26 NOTE — PROGRESS NOTES
Subjective:    JENNIE Ramos is a 59 y.o. female here today for:  Chief Complaint   Patient presents with    Physical Exam   .      ---Above per clinical staff & reviewed. ---  HPI:  59yof here for well exam  Doing good, no issues or concerns since last visit  Has cardiology appt in January  Health maintenance reviewed  Labs ordered, will check A1C   Health maintenance reviewed, labs ordered  Pt frustrated with weight  Discussed weights and exercise- recommended increasing weight or repetitions or sets, decrease carbs  Discussed meds, side effects, cost    PHQ-2/9 Depression Screening    Little interest or pleasure in doing things: 0 - not at all  Feeling down, depressed, or hopeless: 1 - several days           The following portions of the patient's history were reviewed and updated as appropriate: allergies, current medications, past family history, past medical history, past social history, past surgical history and problem list.    Past Medical History:   Diagnosis Date    Allergic     Anxiety     Depression     Hypothyroidism     was previously on medication    Prediabetes     Syncope     Urge incontinence     Vitamin D deficiency        Past Surgical History:   Procedure Laterality Date    CHOLECYSTECTOMY      HYSTERECTOMY      TONSILLECTOMY      ULNAR TUNNEL RELEASE         Social History     Socioeconomic History    Marital status: /Civil Union     Spouse name: Lincoln    Number of children: 6    Years of education: None    Highest education level: None   Occupational History    None   Tobacco Use    Smoking status: Never    Smokeless tobacco: Never   Vaping Use    Vaping status: Never Used   Substance and Sexual Activity    Alcohol use: Yes     Comment: social    Drug use: Never    Sexual activity: Yes     Partners: Male     Birth control/protection: Female Sterilization     Comment: Defer   Other Topics Concern    None   Social History Narrative    Lives at home with ,  10 years-   has MS, still working    3 kids all local, 8 grandkids, infant granddaughter passed away in march from polycystic kidney disease    Working at [employer] for a year     Social Drivers of Health     Financial Resource Strain: Not on file   Food Insecurity: Not on file   Transportation Needs: Not on file   Physical Activity: Not on file   Stress: Not on file   Social Connections: Unknown (6/18/2024)    Received from [employer]    Social Connections     How often do you feel lonely or isolated from those around you? (Adult - for ages 18 years and over): Not on file   Intimate Partner Violence: Not on file   Housing Stability: Not on file       Current Outpatient Medications   Medication Sig Dispense Refill    Cyanocobalamin (VITAMIN B 12 PO) Take by mouth      escitalopram (LEXAPRO) 20 mg tablet Take 1 tablet (20 mg total) by mouth daily 90 tablet 3    Magnesium 250 MG TABS Take 2 tablets (500 mg total) by mouth daily at bedtime 180 tablet 3    metFORMIN (GLUCOPHAGE-XR) 500 mg 24 hr tablet Take 1 tablet (500 mg total) by mouth daily with breakfast 90 tablet 3    metoprolol succinate (TOPROL-XL) 25 mg 24 hr tablet Take 1 tablet (25 mg total) by mouth 2 (two) times a day 180 tablet 3    omeprazole (PriLOSEC) 40 MG capsule Take 1 capsule (40 mg total) by mouth daily 90 capsule 0    oxybutynin (DITROPAN-XL) 5 mg 24 hr tablet Take 1 tablet (5 mg total) by mouth daily 90 tablet 3    rosuvastatin (CRESTOR) 5 mg tablet TAKE ONE TABLET BY MOUTH EVERY  tablet 1    clobetasol (TEMOVATE) 0.05 % cream Apply 1 application. topically as needed      EPINEPHrine (EPIPEN) 0.3 mg/0.3 mL SOAJ Inject 0.3 mL (0.3 mg total) into a muscle once for 1 dose 0.6 mL 1     No current facility-administered medications for this visit.        Review of Systems   Constitutional: Negative.  Negative for chills and fever.   HENT: Negative.  Negative for ear pain and sore throat.    Eyes:  Negative for pain and visual disturbance.   Respiratory:  "Negative.  Negative for cough and shortness of breath.    Cardiovascular: Negative.  Negative for chest pain and palpitations.   Gastrointestinal: Negative.  Negative for abdominal pain and vomiting.   Genitourinary: Negative.  Negative for dysuria and hematuria.   Musculoskeletal:  Negative for arthralgias and back pain.   Skin:  Negative for color change and rash.   Neurological: Negative.  Negative for seizures and syncope.   Psychiatric/Behavioral: Negative.     All other systems reviewed and are negative.       Objective:    /72 (BP Location: Left arm, Patient Position: Sitting, Cuff Size: Standard)   Pulse 68   Temp 97.9 °F (36.6 °C) (Temporal)   Ht 5' 7\" (1.702 m)   Wt 78.7 kg (173 lb 6.4 oz)   SpO2 99%   BMI 27.16 kg/m²   Wt Readings from Last 3 Encounters:   11/26/24 78.7 kg (173 lb 6.4 oz)   10/21/24 78 kg (172 lb)   10/07/24 78 kg (172 lb)     BP Readings from Last 3 Encounters:   11/26/24 110/72   10/21/24 107/59   08/27/24 112/70       Lab Results   Component Value Date    WBC 7.04 04/16/2024    HGB 11.6 04/16/2024    HCT 33.6 (L) 04/16/2024     04/16/2024    TRIG 74 04/29/2024    HDL 61 04/29/2024    LDLDIRECT 85 09/11/2023    ALT 19 09/11/2023    AST 24 09/11/2023    K 4.0 04/16/2024     04/16/2024    CREATININE 0.73 04/16/2024    BUN 14 04/16/2024    CO2 26 04/16/2024    GLUF 104 (H) 01/19/2024    HGBA1C 6.0 (H) 04/29/2024       Physical Exam  Vitals and nursing note reviewed.   Constitutional:       Appearance: Normal appearance. She is well-developed.   HENT:      Head: Normocephalic and atraumatic.      Right Ear: Tympanic membrane and external ear normal.      Left Ear: Tympanic membrane and external ear normal.      Nose: Nose normal.      Mouth/Throat:      Mouth: Mucous membranes are moist.   Eyes:      Conjunctiva/sclera: Conjunctivae normal.      Pupils: Pupils are equal, round, and reactive to light.   Neck:      Thyroid: No thyromegaly.      Comments: No carotid " bruits  Cardiovascular:      Rate and Rhythm: Normal rate and regular rhythm.      Pulses: Normal pulses.      Heart sounds: Normal heart sounds. No murmur heard.  Pulmonary:      Effort: Pulmonary effort is normal. No respiratory distress.      Breath sounds: Normal breath sounds.   Abdominal:      General: Bowel sounds are normal. There is no distension.      Palpations: Abdomen is soft.      Tenderness: There is no abdominal tenderness.   Musculoskeletal:         General: Normal range of motion.      Cervical back: Normal range of motion and neck supple.   Skin:     General: Skin is warm.      Capillary Refill: Capillary refill takes less than 2 seconds.   Neurological:      General: No focal deficit present.      Mental Status: She is alert and oriented to person, place, and time.      Cranial Nerves: No cranial nerve deficit.   Psychiatric:         Mood and Affect: Mood normal.         Behavior: Behavior normal.         Thought Content: Thought content normal.                       Assessment/Plan:   Rachel was seen today for physical exam.    Diagnoses and all orders for this visit:    Annual physical exam  -     Lipid panel; Future  -     CBC and differential; Future  -     Comprehensive metabolic panel; Future  -     TSH, 3rd generation with Free T4 reflex; Future    Anxiety as acute reaction to exceptional stress  -     escitalopram (LEXAPRO) 20 mg tablet; Take 1 tablet (20 mg total) by mouth daily  -     Lipid panel; Future  -     CBC and differential; Future  -     Comprehensive metabolic panel; Future  -     TSH, 3rd generation with Free T4 reflex; Future    Encounter for screening mammogram for breast cancer  -     Mammo screening bilateral w 3d and cad; Future  -     Lipid panel; Future  -     CBC and differential; Future  -     Comprehensive metabolic panel; Future  -     TSH, 3rd generation with Free T4 reflex; Future    Prediabetes  -     metFORMIN (GLUCOPHAGE-XR) 500 mg 24 hr tablet; Take 1 tablet  "(500 mg total) by mouth daily with breakfast  -     Hemoglobin A1C; Future  -     Lipid panel; Future  -     CBC and differential; Future  -     Comprehensive metabolic panel; Future  -     TSH, 3rd generation with Free T4 reflex; Future    OAB (overactive bladder)  -     oxybutynin (DITROPAN-XL) 5 mg 24 hr tablet; Take 1 tablet (5 mg total) by mouth daily  -     Lipid panel; Future  -     CBC and differential; Future  -     Comprehensive metabolic panel; Future  -     TSH, 3rd generation with Free T4 reflex; Future    SVT (supraventricular tachycardia) (HCC)  -     Lipid panel; Future  -     CBC and differential; Future  -     Comprehensive metabolic panel; Future  -     TSH, 3rd generation with Free T4 reflex; Future    Family history of coronary artery disease  -     Lipid panel; Future  -     CBC and differential; Future  -     Comprehensive metabolic panel; Future  -     TSH, 3rd generation with Free T4 reflex; Future    Screening for cardiovascular condition  -     Lipid panel; Future  -     CBC and differential; Future  -     Comprehensive metabolic panel; Future  -     TSH, 3rd generation with Free T4 reflex; Future    BMI 27.0-27.9,adult  -     Lipid panel; Future  -     CBC and differential; Future  -     Comprehensive metabolic panel; Future  -     TSH, 3rd generation with Free T4 reflex; Future      Return in about 6 months (around 5/26/2025) for Recheck.  Patient Instructions   Patient Education     Routine physical for adults   The Basics   Written by the doctors and editors at [facility]   What is a physical? -- A physical is a routine visit, or \"check-up,\" with your doctor. You might also hear it called a \"wellness visit\" or \"preventive visit.\"  During each visit, the doctor will:   Ask about your physical and mental health   Ask about your habits, behaviors, and lifestyle   Do an exam   Give you vaccines if needed   Talk to you about any medicines you take   Give advice about your health   Answer your " "questions  Getting regular check-ups is an important part of taking care of your health. It can help your doctor find and treat any problems you have. But it's also important for preventing health problems.  A routine physical is different from a \"sick visit.\" A sick visit is when you see a doctor because of a health concern or problem. Since physicals are scheduled ahead of time, you can think about what you want to ask the doctor.  How often should I get a physical? -- It depends on your age and health. In general, for people age 21 years and older:   If you are younger than 50 years, you might be able to get a physical every 3 years.   If you are 50 years or older, your doctor might recommend a physical every year.  If you have an ongoing health condition, like diabetes or high blood pressure, your doctor will probably want to see you more often.  What happens during a physical? -- In general, each visit will include:   Physical exam - The doctor or nurse will check your height, weight, heart rate, and blood pressure. They will also look at your eyes and ears. They will ask about how you are feeling and whether you have any symptoms that bother you.   Medicines - It's a good idea to bring a list of all the medicines you take to each doctor visit. Your doctor will talk to you about your medicines and answer any questions. Tell them if you are having any side effects that bother you. You should also tell them if you are having trouble paying for any of your medicines.   Habits and behaviors - This includes:   Your diet   Your exercise habits   Whether you smoke, drink alcohol, or use drugs   Whether you are sexually active   Whether you feel safe at home  Your doctor will talk to you about things you can do to improve your health and lower your risk of health problems. They will also offer help and support. For example, if you want to quit smoking, they can give you advice and might prescribe medicines. If you want " "to improve your diet or get more physical activity, they can help you with this, too.   Lab tests, if needed - The tests you get will depend on your age and situation. For example, your doctor might want to check your:   Cholesterol   Blood sugar   Iron level   Vaccines - The recommended vaccines will depend on your age, health, and what vaccines you already had. Vaccines are very important because they can prevent certain serious or deadly infections.   Discussion of screening - \"Screening\" means checking for diseases or other health problems before they cause symptoms. Your doctor can recommend screening based on your age, risk, and preferences. This might include tests to check for:   Cancer, such as breast, prostate, cervical, ovarian, colorectal, prostate, lung, or skin cancer   Sexually transmitted infections, such as chlamydia and gonorrhea   Mental health conditions like depression and anxiety  Your doctor will talk to you about the different types of screening tests. They can help you decide which screenings to have. They can also explain what the results might mean.   Answering questions - The physical is a good time to ask the doctor or nurse questions about your health. If needed, they can refer you to other doctors or specialists, too.  Adults older than 65 years often need other care, too. As you get older, your doctor will talk to you about:   How to prevent falling at home   Hearing or vision tests   Memory testing   How to take your medicines safely   Making sure that you have the help and support you need at home  All topics are updated as new evidence becomes available and our peer review process is complete.  This topic retrieved from [employer] on: May 02, 2024.  Topic 355992 Version 1.0  Release: 32.4.3 - C32.122  © 2024 UpToDate, Inc. and/or its affiliates. All rights reserved.  Consumer Information Use and Disclaimer   Disclaimer: This generalized information is a limited summary of diagnosis, " treatment, and/or medication information. It is not meant to be comprehensive and should be used as a tool to help the user understand and/or assess potential diagnostic and treatment options. It does NOT include all information about conditions, treatments, medications, side effects, or risks that may apply to a specific patient. It is not intended to be medical advice or a substitute for the medical advice, diagnosis, or treatment of a health care provider based on the health care provider's examination and assessment of a patient's specific and unique circumstances. Patients must speak with a health care provider for complete information about their health, medical questions, and treatment options, including any risks or benefits regarding use of medications. This information does not endorse any treatments or medications as safe, effective, or approved for treating a specific patient. UpToDate, Inc. and its affiliates disclaim any warranty or liability relating to this information or the use thereof.The use of this information is governed by the Terms of Use, available at https://www.[employer].com/en/know/clinical-effectiveness-terms. 2024© UpToDate, Inc. and its affiliates and/or licensors. All rights reserved.  Copyright   © 2024 UpToDate, Inc. and/or its affiliates. All rights reserved.

## 2024-11-27 PROBLEM — I45.10 INCOMPLETE RIGHT BUNDLE BRANCH BLOCK (RBBB) DETERMINED BY ELECTROCARDIOGRAPHY: Chronic | Status: ACTIVE | Noted: 2018-11-06

## 2024-11-27 PROBLEM — Z82.49 FAMILY HISTORY OF MI (MYOCARDIAL INFARCTION): Chronic | Status: ACTIVE | Noted: 2020-08-26

## 2024-11-27 PROBLEM — Z98.890: Chronic | Status: ACTIVE | Noted: 2018-10-31

## 2024-12-05 ENCOUNTER — HOSPITAL ENCOUNTER (OUTPATIENT)
Dept: RADIOLOGY | Facility: CLINIC | Age: 59
End: 2024-12-05
Payer: COMMERCIAL

## 2024-12-05 ENCOUNTER — APPOINTMENT (OUTPATIENT)
Age: 59
End: 2024-12-05
Payer: COMMERCIAL

## 2024-12-05 VITALS — BODY MASS INDEX: 27.15 KG/M2 | WEIGHT: 173 LBS | HEIGHT: 67 IN

## 2024-12-05 DIAGNOSIS — F41.1 ANXIETY AS ACUTE REACTION TO EXCEPTIONAL STRESS: ICD-10-CM

## 2024-12-05 DIAGNOSIS — R73.03 PREDIABETES: ICD-10-CM

## 2024-12-05 DIAGNOSIS — N32.81 OAB (OVERACTIVE BLADDER): ICD-10-CM

## 2024-12-05 DIAGNOSIS — F43.0 ANXIETY AS ACUTE REACTION TO EXCEPTIONAL STRESS: ICD-10-CM

## 2024-12-05 DIAGNOSIS — I47.10 SVT (SUPRAVENTRICULAR TACHYCARDIA) (HCC): ICD-10-CM

## 2024-12-05 DIAGNOSIS — Z82.49 FAMILY HISTORY OF CORONARY ARTERY DISEASE: ICD-10-CM

## 2024-12-05 DIAGNOSIS — Z00.00 ANNUAL PHYSICAL EXAM: ICD-10-CM

## 2024-12-05 DIAGNOSIS — Z12.31 ENCOUNTER FOR SCREENING MAMMOGRAM FOR BREAST CANCER: ICD-10-CM

## 2024-12-05 DIAGNOSIS — Z13.6 SCREENING FOR CARDIOVASCULAR CONDITION: ICD-10-CM

## 2024-12-05 LAB
ALBUMIN SERPL BCG-MCNC: 4.3 G/DL (ref 3.5–5)
ALP SERPL-CCNC: 48 U/L (ref 34–104)
ALT SERPL W P-5'-P-CCNC: 17 U/L (ref 7–52)
ANION GAP SERPL CALCULATED.3IONS-SCNC: 7 MMOL/L (ref 4–13)
AST SERPL W P-5'-P-CCNC: 23 U/L (ref 13–39)
BASOPHILS # BLD AUTO: 0.07 THOUSANDS/ÂΜL (ref 0–0.1)
BASOPHILS NFR BLD AUTO: 1 % (ref 0–1)
BILIRUB SERPL-MCNC: 0.62 MG/DL (ref 0.2–1)
BUN SERPL-MCNC: 14 MG/DL (ref 5–25)
CALCIUM SERPL-MCNC: 9.5 MG/DL (ref 8.4–10.2)
CHLORIDE SERPL-SCNC: 100 MMOL/L (ref 96–108)
CHOLEST SERPL-MCNC: 185 MG/DL (ref ?–200)
CO2 SERPL-SCNC: 30 MMOL/L (ref 21–32)
CREAT SERPL-MCNC: 0.85 MG/DL (ref 0.6–1.3)
EOSINOPHIL # BLD AUTO: 0.41 THOUSAND/ÂΜL (ref 0–0.61)
EOSINOPHIL NFR BLD AUTO: 5 % (ref 0–6)
ERYTHROCYTE [DISTWIDTH] IN BLOOD BY AUTOMATED COUNT: 11.6 % (ref 11.6–15.1)
EST. AVERAGE GLUCOSE BLD GHB EST-MCNC: 128 MG/DL
GFR SERPL CREATININE-BSD FRML MDRD: 75 ML/MIN/1.73SQ M
GLUCOSE P FAST SERPL-MCNC: 86 MG/DL (ref 65–99)
HBA1C MFR BLD: 6.1 %
HCT VFR BLD AUTO: 40.9 % (ref 34.8–46.1)
HDLC SERPL-MCNC: 68 MG/DL
HGB BLD-MCNC: 14 G/DL (ref 11.5–15.4)
IMM GRANULOCYTES # BLD AUTO: 0.03 THOUSAND/UL (ref 0–0.2)
IMM GRANULOCYTES NFR BLD AUTO: 0 % (ref 0–2)
LDLC SERPL CALC-MCNC: 100 MG/DL (ref 0–100)
LYMPHOCYTES # BLD AUTO: 3.09 THOUSANDS/ÂΜL (ref 0.6–4.47)
LYMPHOCYTES NFR BLD AUTO: 37 % (ref 14–44)
MCH RBC QN AUTO: 32.9 PG (ref 26.8–34.3)
MCHC RBC AUTO-ENTMCNC: 34.2 G/DL (ref 31.4–37.4)
MCV RBC AUTO: 96 FL (ref 82–98)
MONOCYTES # BLD AUTO: 0.58 THOUSAND/ÂΜL (ref 0.17–1.22)
MONOCYTES NFR BLD AUTO: 7 % (ref 4–12)
NEUTROPHILS # BLD AUTO: 4.26 THOUSANDS/ÂΜL (ref 1.85–7.62)
NEUTS SEG NFR BLD AUTO: 50 % (ref 43–75)
NONHDLC SERPL-MCNC: 117 MG/DL
NRBC BLD AUTO-RTO: 0 /100 WBCS
PLATELET # BLD AUTO: 270 THOUSANDS/UL (ref 149–390)
PMV BLD AUTO: 9.6 FL (ref 8.9–12.7)
POTASSIUM SERPL-SCNC: 4.7 MMOL/L (ref 3.5–5.3)
PROT SERPL-MCNC: 7 G/DL (ref 6.4–8.4)
RBC # BLD AUTO: 4.25 MILLION/UL (ref 3.81–5.12)
SODIUM SERPL-SCNC: 137 MMOL/L (ref 135–147)
TRIGL SERPL-MCNC: 86 MG/DL (ref ?–150)
TSH SERPL DL<=0.05 MIU/L-ACNC: 2.75 UIU/ML (ref 0.45–4.5)
WBC # BLD AUTO: 8.44 THOUSAND/UL (ref 4.31–10.16)

## 2024-12-05 PROCEDURE — 85025 COMPLETE CBC W/AUTO DIFF WBC: CPT

## 2024-12-05 PROCEDURE — 77063 BREAST TOMOSYNTHESIS BI: CPT

## 2024-12-05 PROCEDURE — 80061 LIPID PANEL: CPT

## 2024-12-05 PROCEDURE — 84443 ASSAY THYROID STIM HORMONE: CPT

## 2024-12-05 PROCEDURE — 36415 COLL VENOUS BLD VENIPUNCTURE: CPT

## 2024-12-05 PROCEDURE — 77067 SCR MAMMO BI INCL CAD: CPT

## 2024-12-05 PROCEDURE — 83036 HEMOGLOBIN GLYCOSYLATED A1C: CPT

## 2024-12-05 PROCEDURE — 80053 COMPREHEN METABOLIC PANEL: CPT

## 2024-12-10 ENCOUNTER — RESULTS FOLLOW-UP (OUTPATIENT)
Dept: FAMILY MEDICINE CLINIC | Facility: CLINIC | Age: 59
End: 2024-12-10

## 2025-01-15 RX ORDER — PNV NO.95/FERROUS FUM/FOLIC AC 28MG-0.8MG
250 TABLET ORAL DAILY
COMMUNITY
Start: 2024-11-25 | End: 2025-02-07

## 2025-02-07 ENCOUNTER — OFFICE VISIT (OUTPATIENT)
Dept: CARDIOLOGY CLINIC | Facility: CLINIC | Age: 60
End: 2025-02-07
Payer: COMMERCIAL

## 2025-02-07 VITALS
DIASTOLIC BLOOD PRESSURE: 70 MMHG | HEART RATE: 50 BPM | HEIGHT: 67 IN | TEMPERATURE: 97.2 F | OXYGEN SATURATION: 97 % | BODY MASS INDEX: 27.78 KG/M2 | SYSTOLIC BLOOD PRESSURE: 132 MMHG | WEIGHT: 177 LBS

## 2025-02-07 DIAGNOSIS — E78.00 PURE HYPERCHOLESTEROLEMIA: ICD-10-CM

## 2025-02-07 DIAGNOSIS — I47.10 SVT (SUPRAVENTRICULAR TACHYCARDIA) (HCC): Primary | ICD-10-CM

## 2025-02-07 DIAGNOSIS — G47.33 OSA (OBSTRUCTIVE SLEEP APNEA): ICD-10-CM

## 2025-02-07 DIAGNOSIS — R94.31 ABNORMAL ECG DURING EXERCISE STRESS TEST: ICD-10-CM

## 2025-02-07 PROBLEM — E78.2 MIXED HYPERLIPIDEMIA: Status: RESOLVED | Noted: 2025-02-07 | Resolved: 2025-02-07

## 2025-02-07 PROBLEM — E78.2 MIXED HYPERLIPIDEMIA: Status: ACTIVE | Noted: 2025-02-07

## 2025-02-07 PROCEDURE — 99214 OFFICE O/P EST MOD 30 MIN: CPT | Performed by: NURSE PRACTITIONER

## 2025-02-07 RX ORDER — METOPROLOL SUCCINATE 25 MG/1
25 TABLET, EXTENDED RELEASE ORAL 2 TIMES DAILY
Qty: 180 TABLET | Refills: 3 | Status: SHIPPED | OUTPATIENT
Start: 2025-02-07

## 2025-02-07 RX ORDER — ROSUVASTATIN CALCIUM 5 MG/1
5 TABLET, COATED ORAL DAILY
Qty: 90 TABLET | Refills: 3 | Status: SHIPPED | OUTPATIENT
Start: 2025-02-07

## 2025-02-07 RX ORDER — MULTIVITAMIN WITH IRON
500 TABLET ORAL
Qty: 180 TABLET | Refills: 3 | Status: SHIPPED | OUTPATIENT
Start: 2025-02-07

## 2025-02-07 NOTE — ASSESSMENT & PLAN NOTE
Goal LDL < 100 given family history  Currently on rosuvastatin 5 mg daily  Continue current regimen  Recheck lipid panel 12/2025

## 2025-02-07 NOTE — PATIENT INSTRUCTIONS
I will message your PCP directly about weight loss medication.  Continue same regimen from a cardiac standpoint.

## 2025-02-07 NOTE — ASSESSMENT & PLAN NOTE
Referral to cardiology for intermittent rapid heart rates.   ECG with short MI interval (no delta waves).  Prior history of syncope x 2.   Echocardiogram and stress echo 7/28/2023 are unrevealing.  14 day ZIO monitor 6/15-7/5/2023 show 29 runs of SVT, longest 10.7 seconds. 13 triggers and 10 diary entries primarily correlate with NSR, with one episode of fluttering/racing correlating with the longest SVT episode.  She has since been started on metoprolol succinate 25 mg BID with complete symptom relief.  Continue magnesium to 500 mg daily.  JOSE testing reveals mild sleep apnea.   yes

## 2025-02-07 NOTE — ASSESSMENT & PLAN NOTE
Body mass index is 27.72 kg/m².  Encouraged dietary and exercise modifications.  Avoid stimulant weight loss drugs given SVT history

## 2025-02-07 NOTE — PROGRESS NOTES
Cardiology Follow Up    Rachel Novak  1965  [id number]  [facility] [place] [address]  [address] FLO[address]  [facility] [address] [phone]    Rachel presents for follow up of palpitations, chest pressure, family history of CAD.     1. SVT (supraventricular tachycardia) (HCC)  Assessment & Plan:  Referral to cardiology for intermittent rapid heart rates.   ECG with short KS interval (no delta waves).  Prior history of syncope x 2.   Echocardiogram and stress echo 7/28/2023 are unrevealing.  14 day ZIO monitor 6/15-7/5/2023 show 29 runs of SVT, longest 10.7 seconds. 13 triggers and 10 diary entries primarily correlate with NSR, with one episode of fluttering/racing correlating with the longest SVT episode.  She has since been started on metoprolol succinate 25 mg BID with complete symptom relief.  Continue magnesium to 500 mg daily.  JOSE testing reveals mild sleep apnea.  Orders:  -     Magnesium 250 MG TABS; Take 2 tablets (500 mg total) by mouth daily at bedtime  -     metoprolol succinate (TOPROL-XL) 25 mg 24 hr tablet; Take 1 tablet (25 mg total) by mouth 2 (two) times a day  2. Abnormal ECG during exercise stress test  Assessment & Plan:  False positive exercise stress test documented in cardiology note 12/3/2014.   Stress echocardiogram was done for follow up 12/2014 and showed no evidence of ischemia.   Repeat stress echo 7/28/2023 shows good exercise tolerance with no echo evidence of ischemia  3. Pure hypercholesterolemia  Assessment & Plan:  Lipid panel 4/29/2024: C 162. T 74. H 61. L 86.   on 12/2024 labs  Currently on rosuvastatin 5 mg daily.  Goal LDL < 100  Repeat blood work winter 2025    Orders:  -     rosuvastatin (CRESTOR) 5 mg tablet; Take 1 tablet (5 mg total) by mouth daily  4. JOSE (obstructive sleep apnea)  Assessment & Plan:  Home sleep study 5/4/2024 shows mild JOSE with AHI 9.9 and no obvious hypoxia.  Met with sleep  medicine but could not tolerate CPAP.  Recommend oral appliance and wedge to raise head of bed.  5. BMI 27.0-27.9,adult  Assessment & Plan:  Body mass index is 27.72 kg/m².  Encouraged dietary and exercise modifications.  Avoid stimulant weight loss drugs given SVT history       JENNIE Ramos has a past medical history of SVT, pre-diabetes, hyperlipidemia, GERD, and family history of premature CAD in her father and brother. History of incomplete RBBB on ECG.     Her father had an MI in his 40-50's and eventual CABG. Brother had heart disease and a stroke diagnosed in his 40's.     She was initially referred to cardiology in 2014 for cardiac work up due to episodes of chest tightness and heart pounding. She met with [facility], Dr. Wiggins in December 2014. She had undergone an exercise stress test 11/4/2014 with positive ECG changes with stress. Echocardiogram 11/5/14 showed LVEF 60% with no significant abnormality. Holter monitor showed rare PAC's/PVC's. A stress echo was ordered and completed 12/31/14 which showed normal exercise tolerance with no echo evidence of ischemia at 85% MPHR.      She was referred back to cardiology for continued symptoms and met in consultation with Dr. Peña at our office 6/16/2023. She reported episodes of chest pressure/tightness associated with heart pounding. She also reported a sharp mid sternal pain. ECG showed SR with IRBBB and a short SC interval with no delta waves noted. She reported a history of syncope x 2. Updated echocardiogram, stress echo, and 14 day ZIO monitor were ordered.     ZIO 6/21-7/5/2023 showed 29 runs of SVT, longest 10.7 seconds with avg HR 80 bpm. Rare PAC's/PVC's. 13 triggers and 10 diary entries with most symptoms correlating with NSR, fluttering/racing episode correlated with the longest run of SVT.      Echo 7/28/2023 showed LVEF 60-65% with mild MR and no concerning findings.   Stress echo with Kevin protocol showed no echo evidence of ischemia at  10.1 mets and 93% MPHR.     She followed up most recently 7/22/2024. She had been seen at the ER for chest pain which ultimately resolved with PPI. Cardiac work up was unremarkable. She was started on CPAP by sleep medicine.     2/7/2025: Rachel presents for routine follow up. She is doing well from a cardiac standpoint. No palpitations/heart racing. She completed labs for her PCP in December and results are reviewed with her today. She is frustrated with her weight and questions what she could take for weight loss.     Medical Problems       Problem List       Precordial pain    Abnormal ECG during exercise stress test    Family history of coronary artery disease    Pure hypercholesterolemia    Absence of cervix    Anxiety as acute reaction to exceptional stress    Contact dermatitis and other eczema, due to unspecified cause    Mild tricuspid regurgitation    Family history of basal cell carcinoma    Family history of diabetes mellitus in first degree relative    History of decompression of both ulnar nerves (Chronic)    Incomplete right bundle branch block (RBBB) determined by electrocardiography (Chronic)    Menopause    Prediabetes    Shortened NM interval    Mild mitral regurgitation    Gastroesophageal reflux disease without esophagitis    SVT (supraventricular tachycardia) (HCC)    Family history of polycystic kidney disease, autosomal recessive    JOSE (obstructive sleep apnea)    Right foot pain    Metatarsalgia of right foot    Family history of MI (myocardial infarction) (Chronic)        Past Medical History:   Diagnosis Date    Allergic     Anxiety     Depression     Hypothyroidism     was previously on medication    Prediabetes     Syncope     Urge incontinence     Vitamin D deficiency      Social History     Socioeconomic History    Marital status: /Civil Union     Spouse name: Lincoln    Number of children: 6    Years of education: Not on file    Highest education level: Not on file    Occupational History    Not on file   Tobacco Use    Smoking status: Never    Smokeless tobacco: Never   Vaping Use    Vaping status: Never Used   Substance and Sexual Activity    Alcohol use: Yes     Comment: social    Drug use: Never    Sexual activity: Yes     Partners: Male     Birth control/protection: Female Sterilization     Comment: Defer   Other Topics Concern    Not on file   Social History Narrative    Lives at home with ,  10 years-  has MS, still working    3 kids all local, 8 grandkids, infant granddaughter passed away in march from polycystic kidney disease    Working at [employer] for a year     Social Drivers of Health     Financial Resource Strain: Not on file   Food Insecurity: Not on file   Transportation Needs: Not on file   Physical Activity: Not on file   Stress: Not on file   Social Connections: Unknown (2024)    Received from [email or web address]     How often do you feel lonely or isolated from those around you? (Adult - for ages 18 years and over): Not on file   Intimate Partner Violence: Not on file   Housing Stability: Not on file      Family History   Problem Relation Age of Onset    Diabetes Mother     Hypertension Mother     Coronary artery disease Father         Dad  with MI 50's.  at age 78    Diabetes Father     Basal cell carcinoma Father     Parkinsonism Father     No Known Problems Maternal Grandmother     No Known Problems Maternal Grandfather     No Known Problems Paternal Grandmother     No Known Problems Paternal Grandfather     Hypertension Brother     Transient ischemic attack Brother     Heart disease Brother     Breast cancer Paternal Aunt         age- 60's    No Known Problems Paternal Aunt     No Known Problems Paternal Aunt     No Known Problems Paternal Aunt     No Known Problems Paternal Aunt      Past Surgical History:   Procedure Laterality Date    CHOLECYSTECTOMY      HYSTERECTOMY      TONSILLECTOMY      ULNAR TUNNEL  RELEASE         Current Outpatient Medications:     clobetasol (TEMOVATE) 0.05 % cream, Apply 1 application. topically as needed, Disp: , Rfl:     Cyanocobalamin (VITAMIN B 12 PO), Take by mouth, Disp: , Rfl:     EPINEPHrine (EPIPEN) 0.3 mg/0.3 mL SOAJ, Inject 0.3 mL (0.3 mg total) into a muscle once for 1 dose, Disp: 0.6 mL, Rfl: 1    escitalopram (LEXAPRO) 20 mg tablet, Take 1 tablet (20 mg total) by mouth daily, Disp: 90 tablet, Rfl: 3    Magnesium 250 MG TABS, Take 2 tablets (500 mg total) by mouth daily at bedtime, Disp: 180 tablet, Rfl: 3    metFORMIN (GLUCOPHAGE-XR) 500 mg 24 hr tablet, Take 1 tablet (500 mg total) by mouth daily with breakfast, Disp: 90 tablet, Rfl: 3    metoprolol succinate (TOPROL-XL) 25 mg 24 hr tablet, Take 1 tablet (25 mg total) by mouth 2 (two) times a day, Disp: 180 tablet, Rfl: 3    omeprazole (PriLOSEC) 40 MG capsule, Take 1 capsule (40 mg total) by mouth daily, Disp: 90 capsule, Rfl: 0    oxybutynin (DITROPAN-XL) 5 mg 24 hr tablet, Take 1 tablet (5 mg total) by mouth daily, Disp: 90 tablet, Rfl: 3    rosuvastatin (CRESTOR) 5 mg tablet, Take 1 tablet (5 mg total) by mouth daily, Disp: 90 tablet, Rfl: 3  Allergies   Allergen Reactions    Bee Venom Swelling and Vomiting    Metformin Diarrhea    Other Blisters     Holter Monitor Stickers - Other reaction(s): very sensitive (even to sensitive stickers) - causes welts        Labs:     Chemistry        Component Value Date/Time    K 4.7 12/05/2024 1108    K 4.8 08/23/2022 0810     12/05/2024 1108    CO2 30 12/05/2024 1108    CO2 28.3 08/23/2022 0810    BUN 14 12/05/2024 1108    BUN 10 08/23/2022 0810    CREATININE 0.85 12/05/2024 1108    CREATININE 0.79 08/23/2022 0810        Component Value Date/Time    CALCIUM 9.5 12/05/2024 1108    CALCIUM 9.6 08/23/2022 0810    ALKPHOS 48 12/05/2024 1108    ALKPHOS 49 08/23/2022 0810    AST 23 12/05/2024 1108    AST 18 08/23/2022 0810    ALT 17 12/05/2024 1108    ALT 12 08/23/2022 0810     "    Lipid panel 12/5/2024: C 185. T 86. H 68. L 100    Lipid panel 4/29/2024: C 162. T 74. H 61. L 86      Cardiac testing:  ECG 4/16/2024: Sinus bradycardia at 57 bpm with IRBBB and nonspecific ST/T wave abnormality. NM interval 124.     Stress echo 7/28/2023:  Kevin protocol. 10.1 METs. 93% MPHR.  No echo evidence of ischemia.     Echocardiogram 7/28/2023:  LVEF 60-65%. Mild MR. LOCKE 6/21-7/5/2023:  Min HR 41. Max HR in . Avg HR 64 bpm.   29 runs of SVT with the longest run lasting 10.7 seconds with an average heart rate of 180 bpm.   Rare PAC's. Rare PVC's.   13 triggers and 10 diary entries.  Most symptoms correlated with normal sinus rhythm.   Fluttering/racing symptoms correlated with the longest run of SVT and one episode of possible atrial tachycardia and one episode of normal sinus rhythm with transition into possible atrial tachycardia.     ECG 6/16/2023: SB. Rate 56 bpm. Short NM interval. Rightward axis. Nonspecific ST/T wave abnormality.    Review of Systems   Constitutional: Negative.   HENT: Negative.     Cardiovascular:  Negative for chest pain, dyspnea on exertion, irregular heartbeat, leg swelling, near-syncope, orthopnea and palpitations.   Respiratory:  Negative for cough and snoring.    Endocrine: Negative.    Skin: Negative.    Musculoskeletal: Negative.    Gastrointestinal: Negative.    Genitourinary: Negative.    Neurological: Negative.    Psychiatric/Behavioral: Negative.         Vitals:    02/07/25 1258   BP: 132/70   Pulse: (!) 50   Temp: (!) 97.2 °F (36.2 °C)   SpO2: 97%     Vitals:    02/07/25 1258   Weight: 80.3 kg (177 lb)     Height: 5' 7\" (170.2 cm)   Body mass index is 27.72 kg/m².    Physical Exam  Vitals and nursing note reviewed.   Constitutional:       General: She is not in acute distress.     Appearance: She is well-developed. She is not diaphoretic.   HENT:      Head: Normocephalic and atraumatic.   Neck:      Thyroid: No thyromegaly.      Vascular: No carotid bruit " or JVD.   Cardiovascular:      Rate and Rhythm: Normal rate and regular rhythm.      Pulses: Intact distal pulses.           Radial pulses are 2+ on the right side and 2+ on the left side.      Heart sounds: Normal heart sounds, S1 normal and S2 normal. No murmur heard.  Pulmonary:      Effort: Pulmonary effort is normal.      Breath sounds: Normal breath sounds.   Abdominal:      General: There is no distension.      Palpations: Abdomen is soft.      Tenderness: There is no abdominal tenderness.   Musculoskeletal:         General: Normal range of motion.      Cervical back: Normal range of motion and neck supple.   Lymphadenopathy:      Cervical: No cervical adenopathy.   Skin:     General: Skin is warm and dry.   Neurological:      Mental Status: She is alert and oriented to person, place, and time.      Cranial Nerves: No cranial nerve deficit.   Psychiatric:         Behavior: Behavior normal.

## 2025-02-07 NOTE — ASSESSMENT & PLAN NOTE
Lipid panel 4/29/2024: C 162. T 74. H 61. L 86.   on 12/2024 labs  Currently on rosuvastatin 5 mg daily.  Goal LDL < 100  Repeat blood work winter 2025

## 2025-02-13 ENCOUNTER — APPOINTMENT (OUTPATIENT)
Age: 60
End: 2025-02-13
Payer: COMMERCIAL

## 2025-02-13 ENCOUNTER — OFFICE VISIT (OUTPATIENT)
Dept: FAMILY MEDICINE CLINIC | Facility: CLINIC | Age: 60
End: 2025-02-13
Payer: COMMERCIAL

## 2025-02-13 VITALS
OXYGEN SATURATION: 98 % | SYSTOLIC BLOOD PRESSURE: 98 MMHG | BODY MASS INDEX: 27.75 KG/M2 | HEIGHT: 67 IN | HEART RATE: 62 BPM | TEMPERATURE: 96.1 F | DIASTOLIC BLOOD PRESSURE: 60 MMHG | WEIGHT: 176.8 LBS

## 2025-02-13 DIAGNOSIS — R20.0 NUMBNESS AND TINGLING IN RIGHT HAND: Primary | ICD-10-CM

## 2025-02-13 DIAGNOSIS — R20.0 NUMBNESS AND TINGLING IN RIGHT HAND: ICD-10-CM

## 2025-02-13 DIAGNOSIS — M54.50 ACUTE BILATERAL LOW BACK PAIN WITHOUT SCIATICA: ICD-10-CM

## 2025-02-13 DIAGNOSIS — R20.2 NUMBNESS AND TINGLING IN RIGHT HAND: ICD-10-CM
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
## 2025-02-13 DIAGNOSIS — R20.2 NUMBNESS AND TINGLING IN RIGHT HAND: Primary | ICD-10-CM

## 2025-02-13 PROCEDURE — 72100 X-RAY EXAM L-S SPINE 2/3 VWS: CPT

## 2025-02-13 PROCEDURE — 72040 X-RAY EXAM NECK SPINE 2-3 VW: CPT

## 2025-02-13 PROCEDURE — 99214 OFFICE O/P EST MOD 30 MIN: CPT | Performed by: FAMILY MEDICINE

## 2025-02-13 NOTE — PROGRESS NOTES
Name: Rachel Novak      : 1965      MRN: [id number]  Encounter Provider: Magaly Akhtar MD  Encounter Date: 2025   Encounter department: [facility]  :  Assessment & Plan  Numbness and tingling in right hand    Orders:    XR spine lumbar 2 or 3 views injury; Future    XR spine cervical 2 or 3 vw injury; Future    Acute bilateral low back pain without sciatica    Orders:    XR spine lumbar 2 or 3 views injury; Future    XR spine cervical 2 or 3 vw injury; Future    Fall, initial encounter    Orders:    XR spine lumbar 2 or 3 views injury; Future    XR spine cervical 2 or 3 vw injury; Future    BMI 27.0-27.9,adult  Discussed via email with cardiology  Orders:    Naltrexone-buPROPion HCl ER 8-90 MG TB12; Take 1 tablet by mouth in the morning           History of Present Illness   HPI  60yof here after having fall on ice a week ago  States she slipped on ice and fell on buttocks and back  Still having lower back pain  Most concerning is hand numbness similar to before she had her ulnar nerve surgery about 5 years ago  States the numbness and tingling of her arm and hand have been getting worse not better over the past week since fall  Will get Xrays- surgery was at [facility] but pt would need to see [facility] due to insurance    Also spoke with cardiology regarding weight loss  Cardiology okay with wellbutrin/naloxone  BMI 27 A1C 6.1  Pt would like to try this, discussed side effect of tachycardia   Review of Systems   Constitutional: Negative.  Negative for chills and fever.   HENT: Negative.  Negative for ear pain and sore throat.    Eyes:  Negative for pain and visual disturbance.   Respiratory: Negative.  Negative for cough and shortness of breath.    Cardiovascular: Negative.  Negative for chest pain and palpitations.   Gastrointestinal: Negative.  Negative for abdominal pain and vomiting.   Genitourinary: Negative.  Negative for dysuria and hematuria.   Musculoskeletal:   "Positive for back pain. Negative for arthralgias.   Skin:  Negative for color change and rash.   Neurological:  Positive for numbness. Negative for seizures and syncope.   Psychiatric/Behavioral: Negative.         Objective   BP 98/60 (BP Location: Left arm, Patient Position: Sitting, Cuff Size: Large)   Pulse 62   Temp (!) 96.1 °F (35.6 °C) (Temporal)   Ht 5' 7\" (1.702 m)   Wt 80.2 kg (176 lb 12.8 oz)   SpO2 98%   BMI 27.69 kg/m²      Physical Exam  Vitals and nursing note reviewed.   Constitutional:       Appearance: Normal appearance.   HENT:      Head: Normocephalic.   Eyes:      Extraocular Movements: Extraocular movements intact.      Pupils: Pupils are equal, round, and reactive to light.   Pulmonary:      Effort: Pulmonary effort is normal. No respiratory distress.   Musculoskeletal:         General: Tenderness present.      Cervical back: Neck supple.      Comments: Mild tenderness of lower paraspinal muscles in lumbar area  Worsening of numbness and tingling with wrist flexion no tenderness on palpation or ecchymoses   Skin:     General: Skin is warm.   Neurological:      General: No focal deficit present.      Mental Status: She is alert and oriented to person, place, and time.   Psychiatric:         Mood and Affect: Mood normal.         Behavior: Behavior normal.         "

## 2025-02-13 NOTE — ASSESSMENT & PLAN NOTE
Discussed via email with cardiology  Orders:    Naltrexone-buPROPion HCl ER 8-90 MG TB12; Take 1 tablet by mouth in the morning

## 2025-02-17 ENCOUNTER — TELEPHONE (OUTPATIENT)
Age: 60
End: 2025-02-17

## 2025-02-17 NOTE — LETTER
2025  ATTN: Appeals Department     Patient: Rachel Novak :1965 Member ID: [id number]   Re: Request for Reconsideration of Contrave 8-90MG      To Whom It May Concern:   Rachel Novak 1965 was denied coverage for the medication Contrave on 2025. The denial reason was stated as not covered due to not meeting insurance criteria. I am requesting a redetermination of the denial of coverage as cardiology has recommended Contrave due to my patient's cardiac history.       In conclusion, please reconsider the denial of Contrave 8-90MG  for my patient. I would appreciate prompt review of this appeal and approval of this FDA-approved medication. I can be reached by phone at [phone] or via fax at [phone] for additional information and discussion. Thank you.      Sincerely,   Magaly Akhtar MD

## 2025-02-17 NOTE — TELEPHONE ENCOUNTER
PA for Naltrexone-buPROPion HCl ER 8-90 MG SUBMITTED to [employer]    via    []CMM-KEY:   [x]Surescripts-Case ID # [id number]   []Availity-Auth ID # NDC #   []Faxed to plan   []Other website   []Phone call Case ID #     []PA sent as URGENT    All office notes, labs and other pertaining documents and studies sent. Clinical questions answered. Awaiting determination from insurance company.     Turnaround time for your insurance to make a decision on your Prior Authorization can take 7-21 business days.

## 2025-02-25 ENCOUNTER — TELEPHONE (OUTPATIENT)
Age: 60
End: 2025-02-25

## 2025-02-25 DIAGNOSIS — R20.0 NUMBNESS AND TINGLING IN RIGHT HAND: Primary | ICD-10-CM

## 2025-02-25 DIAGNOSIS — R20.2 NUMBNESS AND TINGLING IN RIGHT HAND: Primary | ICD-10-CM
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
## 2025-02-25 DIAGNOSIS — Z98.890 HISTORY OF DECOMPRESSION OF BOTH ULNAR NERVES: Chronic | ICD-10-CM

## 2025-02-25 NOTE — TELEPHONE ENCOUNTER
Spoke with pt today. Pt is going to call previous ortho who did ulnar nerve surgery to see if she can see them with insurance. Will keep us posted.

## 2025-02-25 NOTE — TELEPHONE ENCOUNTER
Patient contacted the office this morning in regards to the XR spine cervical and lumbar completed on 02/13/25. Asking if pcp can please review and discuss next steps. Still experiencing the right arm numbness, has not seen any improvement in symptoms. Please contact patient back, thank you.

## 2025-02-25 NOTE — TELEPHONE ENCOUNTER
PA for Naltrexone-buPROPion HCl ER 8-90 MG DENIED    Reason:(Screenshot if applicable)        Message sent to office clinical pool Yes    Denial letter scanned into Media Yes    Appeal started No (Provider will need to decide if appeal is warranted and send clinical documentation to Prior Authorization Team for initiation.)    **Please follow up with your patient regarding denial and next steps**

## 2025-02-26 NOTE — TELEPHONE ENCOUNTER
PA for Contrave 8-90MG APPEALED via     []CMM  []SS  [x]Letter sent to insurance via fax [phone]  []Other site or means     All necessary records sent. Will await response from insurance company    Turnaround time for a decision to be made on an appeal could take up to 30 business days

## 2025-02-27 ENCOUNTER — RESULTS FOLLOW-UP (OUTPATIENT)
Dept: FAMILY MEDICINE CLINIC | Facility: CLINIC | Age: 60
End: 2025-02-27

## 2025-03-11 ENCOUNTER — TELEPHONE (OUTPATIENT)
Age: 60
End: 2025-03-11

## 2025-03-11 NOTE — TELEPHONE ENCOUNTER
[facility] called to see if the PA fax they sent for patient's Contrave was received. They faxed two requests. The most recent was yesterday and report it needs to be submitted back to them by tomorrow.

## 2025-03-13 NOTE — TELEPHONE ENCOUNTER
Received call from Jake with [facility] stating that PA for Contrave was denied and a letter will be faxed to the office.

## 2025-04-08 NOTE — TELEPHONE ENCOUNTER
Reason for call:   [x] Refill   [] Prior Auth  [] Other:     Office:   [x] PCP/Provider - Magaly Akhtar   [] Specialty/Provider -     Medication: Naltrexone-buPROPion HCl ER 8-90 MG TB12     Dose/Frequency: Take 1 tablet by mouth in the morning     Quantity: 30    Pharmacy: [facility]   Does the patient have enough for 3 days?   [x] Yes   [] No - Send as HP to POD

## 2025-04-10 NOTE — TELEPHONE ENCOUNTER
NOT A DUPLICATE:  Patient confirmed script went to the wrong pharmacy. Requesting script sent to [facility] pharmacy.     Reason for call:   [x] Refill   [] Prior Auth  [] Other:     Office:   [x] PCP/Provider -   [] Specialty/Provider -     Medication: Naltrexone-buPROPion HCl ER 8-90 MG     Dose/Frequency: Take 1 tablet by mouth in the morning     Quantity: 30    Pharmacy: [facility]/pharmacy #[id number] - [place] - [address]     Local Pharmacy   Does the patient have enough for 3 days?   [] Yes   [x] No - Send as HP to POD    Mail Away Pharmacy   Does the patient have enough for 10 days?   [] Yes   [] No - Send as HP to POD

## 2025-05-27 ENCOUNTER — OFFICE VISIT (OUTPATIENT)
Dept: FAMILY MEDICINE CLINIC | Facility: CLINIC | Age: 60
End: 2025-05-27
Payer: COMMERCIAL

## 2025-05-27 ENCOUNTER — TELEPHONE (OUTPATIENT)
Age: 60
End: 2025-05-27

## 2025-05-27 VITALS
HEART RATE: 81 BPM | WEIGHT: 164.2 LBS | BODY MASS INDEX: 25.77 KG/M2 | SYSTOLIC BLOOD PRESSURE: 102 MMHG | HEIGHT: 67 IN | TEMPERATURE: 97.4 F | OXYGEN SATURATION: 96 % | DIASTOLIC BLOOD PRESSURE: 82 MMHG | RESPIRATION RATE: 16 BRPM

## 2025-05-27 DIAGNOSIS — R20.0 NUMBNESS AND TINGLING IN RIGHT HAND: ICD-10-CM

## 2025-05-27 DIAGNOSIS — Z98.890 HISTORY OF DECOMPRESSION OF BOTH ULNAR NERVES: ICD-10-CM

## 2025-05-27 DIAGNOSIS — I47.10 SVT (SUPRAVENTRICULAR TACHYCARDIA) (HCC): Primary | ICD-10-CM

## 2025-05-27 DIAGNOSIS — R20.2 NUMBNESS AND TINGLING IN RIGHT HAND: ICD-10-CM

## 2025-05-27 DIAGNOSIS — R73.03 PREDIABETES: ICD-10-CM

## 2025-05-27 PROCEDURE — 99214 OFFICE O/P EST MOD 30 MIN: CPT | Performed by: FAMILY MEDICINE

## 2025-05-27 RX ORDER — HYDROCODONE BITARTRATE AND ACETAMINOPHEN 5; 325 MG/1; MG/1
1 TABLET ORAL EVERY 6 HOURS PRN
COMMUNITY
Start: 2025-04-29

## 2025-05-27 NOTE — PROGRESS NOTES
Name: Rachel Novak      : 1965      MRN: [id number]  Encounter Provider: Magaly Akhtar MD  Encounter Date: 2025   Encounter department: [facility]    Assessment & Plan  SVT (supraventricular tachycardia) (HCC)    Orders:    Comprehensive metabolic panel; Future    BMI 27.0-27.9,adult    Orders:    Naltrexone-buPROPion HCl ER 8-90 MG TB12; Take 1 tablet by mouth in the morning    Numbness and tingling in right hand         History of decompression of both ulnar nerves         Prediabetes    Orders:    Hemoglobin A1C; Future         History of Present Illness     HPI  60yof here for follow up  Using Contrave and doing well on it, no side effects  Has since had ulnar nerve surgery on right arm- Dr Hi  Having numbness of 5th finger and side of hand, has follow up already scheduled  Denies any cardiac symptoms, has follow up   Pt denies any other issues, is feeling pretty good  Health maintenance reviewed  Will check CMP due to medications  Review of Systems   Constitutional: Negative.  Negative for chills and fever.   HENT: Negative.  Negative for ear pain and sore throat.    Eyes:  Negative for pain and visual disturbance.   Respiratory: Negative.  Negative for cough and shortness of breath.    Cardiovascular: Negative.  Negative for chest pain and palpitations.   Gastrointestinal: Negative.  Negative for abdominal pain and vomiting.   Genitourinary: Negative.  Negative for dysuria and hematuria.   Musculoskeletal:  Negative for arthralgias and back pain.   Skin:  Negative for color change and rash.   Neurological:  Positive for numbness. Negative for seizures and syncope.   Psychiatric/Behavioral: Negative.     All other systems reviewed and are negative.    Past Medical History[1]  Past Surgical History[2]  Family History[3]  Social History[4]  Medications[5]  Allergies   Allergen Reactions    Bee Venom Swelling and Vomiting    Metformin Diarrhea    Other Blisters      "Holter Monitor Stickers - Other reaction(s): very sensitive (even to sensitive stickers) - causes welts      Immunization History   Administered Date(s) Administered    COVID-19 PFIZER VACCINE 0.3 ML IM 01/11/2021, 01/31/2021, 10/26/2021    INFLUENZA 04/30/2013, 12/03/2014, 10/05/2015, 10/01/2016, 10/26/2021, 10/13/2022    Influenza Quadrivalent 3 years and older 10/11/2017    Influenza Quadrivalent Preservative Free ID 10/04/2018, 10/07/2019, 10/08/2020    Tdap 08/12/2013, 08/31/2023    Zoster Vaccine Recombinant 01/17/2019, 04/18/2019     Objective   /82 (BP Location: Left arm, Patient Position: Sitting, Cuff Size: Large)   Pulse 81   Temp (!) 97.4 °F (36.3 °C) (Tympanic)   Resp 16   Ht 5' 7\" (1.702 m)   Wt 74.5 kg (164 lb 3.2 oz)   SpO2 96%   BMI 25.72 kg/m²     Physical Exam  Vitals and nursing note reviewed.   Constitutional:       Appearance: Normal appearance.   HENT:      Head: Normocephalic.     Eyes:      Pupils: Pupils are equal, round, and reactive to light.       Cardiovascular:      Rate and Rhythm: Normal rate and regular rhythm.      Pulses: Normal pulses.      Heart sounds: Normal heart sounds.   Pulmonary:      Effort: Pulmonary effort is normal. No respiratory distress.     Musculoskeletal:      Cervical back: Neck supple.     Neurological:      General: No focal deficit present.      Mental Status: She is alert and oriented to person, place, and time.     Psychiatric:         Mood and Affect: Mood normal.         Behavior: Behavior normal.                [1]   Past Medical History:  Diagnosis Date    Allergic     Anxiety     Depression     Hypothyroidism     was previously on medication    Prediabetes     Syncope     Urge incontinence     Vitamin D deficiency    [2]   Past Surgical History:  Procedure Laterality Date    CHOLECYSTECTOMY      HYSTERECTOMY  1999    TONSILLECTOMY      ULNAR TUNNEL RELEASE     [3]   Family History  Problem Relation Name Age of Onset    Diabetes Mother " Adri     Hypertension Mother Adri     Coronary artery disease Father Morris         Dad  with MI 50's.  at age 78    Diabetes Father Morris     Basal cell carcinoma Father Morris     Parkinsonism Father Morris     No Known Problems Maternal Grandmother      No Known Problems Maternal Grandfather      No Known Problems Paternal Grandmother      No Known Problems Paternal Grandfather      Hypertension Brother Morris     Transient ischemic attack Brother Morris     Heart disease Brother Morris     Breast cancer Paternal Aunt Paola         age- 60's    No Known Problems Paternal Aunt      No Known Problems Paternal Aunt      No Known Problems Paternal Aunt      No Known Problems Paternal Aunt     [4]   Social History  Tobacco Use    Smoking status: Never    Smokeless tobacco: Never   Vaping Use    Vaping status: Never Used   Substance and Sexual Activity    Alcohol use: Yes     Comment: social    Drug use: Never    Sexual activity: Yes     Partners: Male     Birth control/protection: Female Sterilization     Comment: Defer   [5]   Current Outpatient Medications on File Prior to Visit   Medication Sig    Cyanocobalamin (VITAMIN B 12 PO) Take by mouth    escitalopram (LEXAPRO) 20 mg tablet Take 1 tablet (20 mg total) by mouth daily    HYDROcodone-acetaminophen (NORCO) 5-325 mg per tablet Take 1 tablet by mouth every 6 (six) hours as needed    Magnesium 250 MG TABS Take 2 tablets (500 mg total) by mouth daily at bedtime    Magnesium Oxide 250 MG TABS     metFORMIN (GLUCOPHAGE-XR) 500 mg 24 hr tablet Take 1 tablet (500 mg total) by mouth daily with breakfast    metoprolol succinate (TOPROL-XL) 25 mg 24 hr tablet Take 1 tablet (25 mg total) by mouth 2 (two) times a day    omeprazole (PriLOSEC) 40 MG capsule Take 1 capsule (40 mg total) by mouth daily    oxybutynin (DITROPAN-XL) 5 mg 24 hr tablet Take 1 tablet (5 mg total) by mouth daily    rosuvastatin (CRESTOR) 5 mg tablet Take 1 tablet (5 mg total) by mouth daily     [DISCONTINUED] Naltrexone-buPROPion HCl ER 8-90 MG TB12 Take 1 tablet by mouth in the morning    clobetasol (TEMOVATE) 0.05 % cream Apply 1 application. topically as needed    EPINEPHrine (EPIPEN) 0.3 mg/0.3 mL SOAJ Inject 0.3 mL (0.3 mg total) into a muscle once for 1 dose

## 2025-05-27 NOTE — TELEPHONE ENCOUNTER
PA for Naltrexone-buPROPion HCl ER 8-90MG SUBMITTED to [employer]    via    []CMM-KEY:   [x]Surescripts-Case ID # [id number]   []Availity-Auth ID # NDC #   []Faxed to plan   []Other website   []Phone call Case ID #     [x]PA sent as URGENT    All office notes, labs and other pertaining documents and studies sent. Clinical questions answered. Awaiting determination from insurance company.     Turnaround time for your insurance to make a decision on your Prior Authorization can take 7-21 business days.

## 2025-06-05 NOTE — TELEPHONE ENCOUNTER
Dr. Fadumo Gatica from [facility] in regards to the prior authorization. She requested to speak to the clinical team in regards to questions about the patient's chart. She requested for someone to call her back on her cell phone [phone].

## 2025-06-06 NOTE — TELEPHONE ENCOUNTER
Dr Fadumo Gatica called to speak with Dr Akhtar regarding prior auth for Contrave. Warm transfer to [facility].

## 2025-06-06 NOTE — TELEPHONE ENCOUNTER
I spoke to them and gave the information that is needed. They wanted to know if patient had tried   Phentermine  Qsymia  Wegovy  Saxenda  If she tried any of these and failed they need this documented in patients chart by Dr Akhtar.

## 2025-06-11 ENCOUNTER — DOCUMENTATION (OUTPATIENT)
Dept: FAMILY MEDICINE CLINIC | Facility: CLINIC | Age: 60
End: 2025-06-11

## 2025-06-11 NOTE — PROGRESS NOTES
Pt is taking Contrave as per cardiology recommendations  Due to cardiac history of SVT, mitral regurgitation, shortened AZ interval, cardiology was consulted and Contrave was the only medication recommended due to side effects. She has been on this medication for several months.

## 2025-07-02 ENCOUNTER — OFFICE VISIT (OUTPATIENT)
Dept: OBGYN CLINIC | Facility: MEDICAL CENTER | Age: 60
End: 2025-07-02
Payer: COMMERCIAL

## 2025-07-02 VITALS — BODY MASS INDEX: 25.74 KG/M2 | WEIGHT: 164 LBS | HEIGHT: 67 IN

## 2025-07-02 DIAGNOSIS — R20.0 NUMBNESS AND TINGLING IN RIGHT HAND: ICD-10-CM

## 2025-07-02 DIAGNOSIS — Z98.890 HISTORY OF DECOMPRESSION OF BOTH ULNAR NERVES: Chronic | ICD-10-CM
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
## 2025-07-02 DIAGNOSIS — R20.2 NUMBNESS AND TINGLING IN RIGHT HAND: ICD-10-CM

## 2025-07-02 PROCEDURE — 99244 OFF/OP CNSLTJ NEW/EST MOD 40: CPT | Performed by: ORTHOPAEDIC SURGERY

## 2025-07-02 NOTE — PROGRESS NOTES
The HAND & UPPER EXTREMITY OFFICE VISIT   Referred By:  Magaly Akhtar Md  [address] [place] [address]      Chief Complaint:     Right hand numbness and weakness    History of Present Illness:   60 y.o., Right hand dominant female presents with right hand numbness and weakness. She has previously undergone multiple surgeries by Dr. Hi for recurrent bilateral cubital tunnel syndrome. She initially had a left cubital tunnel release with intramuscular transposition on 11/15/2018. She then underwent a right cubital tunnel release on 2/24/2020. She reports experiencing relief of her symptoms for about 1 year. She then underwent a revision left cubital tunnel release with revision intramuscular transposition on 1/29/2021, and revision right cubital tunnel release with submuscular transposition on 9/23/2021. She reports experiencing continued symptoms after the revision surgeries. She then had a fall in January 2025 which caused significant aggravation of her right sided symptoms. She underwent a second revision right cubital tunnel release with revision transposition using adipofascial flap on 4/29/25. She reports her symptoms were worse after that procedure, as she now experiences severe numbness in the ring and small fingers, and she notices that her small finger often drifts out to the side.       ADLs: Community ambulator  Smoke: denies ETOH: socially   Drugs:  denies Job: employed - desk work in [employer]       Past Medical History:  Past Medical History[1]  Past Surgical History[2]  Family History[3]  Social History     Socioeconomic History    Marital status: /Civil Union     Spouse name: Lincoln    Number of children: 6    Years of education: Not on file    Highest education level: Not on file   Occupational History    Not on file   Tobacco Use    Smoking status: Never    Smokeless tobacco: Never   Vaping Use    Vaping status: Never Used   Substance and Sexual Activity     "Alcohol use: Yes     Comment: social    Drug use: Never    Sexual activity: Yes     Partners: Male     Birth control/protection: Female Sterilization     Comment: Defer   Other Topics Concern    Not on file   Social History Narrative    Lives at home with ,  10 years-  has MS, still working    3 kids all local, 8 grandkids, infant granddaughter passed away in march from polycystic kidney disease    Working at [employer] for a year     Social Drivers of Health     Financial Resource Strain: Not on file   Food Insecurity: Not on file   Transportation Needs: Not on file   Physical Activity: Not on file   Stress: Not on file   Social Connections: Unknown (6/18/2024)    Received from [facility]     How often do you feel lonely or isolated from those around you? (Adult - for ages 18 years and over): Not on file   Intimate Partner Violence: Not on file   Housing Stability: Not on file     Scheduled Meds:  Continuous Infusions:No current facility-administered medications for this visit.    PRN Meds:.  Allergies[4]        Physical Examination:    Ht 5' 7\" (1.702 m)   Wt 74.4 kg (164 lb)   BMI 25.69 kg/m²     Gen: A&Ox3, NAD  Cardiac: regular rate  Chest: non labored breathing  Abdomen: Non-distended    Right Upper Extremity:  Skin CDI  Positive atrophy of first dorsal interosseous  No obvious deformity of the shoulder, arm, elbow, forearm, wrist, hand  Diminished sensation to light touch in ulnar nerve distribution. Sensation intact to light touch in the axillary median, and radial nerve distributions  5/5 thumb abduction  5/5 thumb opposition  3/5 first dorsal interosseous  1/5 FDP SF  3/5 FDP RF with limited motion  5/5 FDP IF  5/5 FCU, wrist flexion  Positive small finger escape sign  Near-complete composite fist, small finger DIP limited motion  Warm, well-perfused digits  Cap refill <2s  2 point discrimination:   Thumb 5 mm  Index 5 mm  Middle 5 mm  Radial Ring 5 mm  Ulnar Ring " 15 mm  Small >15 mm, only able to feel pressure  Positive Tinel's over course of ulnar nerve 7 cm distal to medial epicondyle      Left Upper Extremity:  Skin CDI  No obvious deformity of the shoulder, arm, elbow, forearm, wrist, hand  Sensation intact to light touch in the axillary median, ulnar, and radial nerve distributions  5/5 thumb abduction  5/5 thumb opposition  5/5 first dorsal interosseous  Warm, well-perfused digits  Cap refill <2s  2 point discrimination:   Thumb 5 mm  Index 5 mm  Middle 5 mm  Radial Ring 5 mm  Ulnar Ring 5 mm  Small 5 mm      Studies:  10/31/2018:  Nerve Conduction Study/EM. Normal bilateral median motor latency with normal amplitude and normal NCV.   2. Normal bilateral ulnar motor latency with normal amplitude. Significant decreased NCV across the bilateral elbows were noted.  3. Normal bilateral median sensory latency with normal amplitude.   4. Mildly prolonged left ulnar sensory latency with decreased amplitude. Normal right ulnar sensory latency with decreased amplitude  5. Normal bilateral numb thumb tests  6. EMG examination of the both upper extremities are grossly within normal limits.  7. Skin temperature is 31 degrees C  Assessment:   1. This is an abnormal test  2. There is an electrodiagnostic evidence of bilateral cubital tunnel syndromes, moderate in degree.  3. There is no electrodiagnostic evidence of bilateral median neuropathy or entrapment such as carpal tunnel syndromes.       2020 - EMG: The left ulnar and deep escoto motor distal latencies and cmap amplitues are normal at the wrist. The motor ncv values are normal in below the elbow-wrist and across elbow segments with mild decreasing velocity across the elebow compared to the distal segment without significant accompanying amplitude decrement.   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        3/21/2025 - EMG: evaluation of the right ulnar motor nerve showed decreased conduction velocity. Right ulnar sensory nerve showed reduced amplitude. All remaining nerves within normal limits. All examined msucles showed no evidence of electrical instability.   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 Normal right median nerve values.     Labs:  I personally reviewed the following labs,   Lab Results   Component Value Date    HGBA1C 6.1 (H) 12/05/2024    HGBA1C 6.0 (H) 04/29/2024    HGBA1C 5.8 08/23/2022    HGBA1C 5.8 02/21/2022         Assessment & Plan  Numbness and tingling in right hand  History of decompression of both ulnar nerves  60 y.o. female presents with signs and symptoms consistent with the above diagnosis.  We discussed the natural history of this condition and its pathogenesis.  We discussed operative and nonoperative treatment options. Her symptoms are consistent with ulnar nerve dysfunction. She is only 9 weeks out from her most recent surgery. She does have some intact function of the ulnar nerve, although with notable weakness and numbnes in the classic ulnar n distribution. Recommend obtaining an updated EMG in 1 month for re-evaluation of her symptoms 3 months after her most recent surgery. It is recommended she return to the office following the EMG to review the results.     C/w therapy in the meantime   Orders:    Ambulatory Referral to Orthopedic Surgery    EMG; Future    she expressed understanding of the plan and agreed. We encouraged them to contact our office with any questions or concerns.         Candido Segundo MD  Hand and Upper Extremity Surgery        *This note was dictated using Dragon voice recognition software. Please excuse any word substitutions or errors.*      Scribe Attestation      I,:  Cassy Sanz PA-C am acting as a scribe while in the presence of the attending physician.:       I,:  Candido Segundo MD personally performed  the services described in this documentation    as scribed in my presence.:                  [1]   Past Medical History:  Diagnosis Date    Allergic     Anxiety     Depression     Hypothyroidism     was previously on medication    Prediabetes     Syncope     Urge incontinence     Vitamin D deficiency    [2]   Past Surgical History:  Procedure Laterality Date    CHOLECYSTECTOMY      HYSTERECTOMY      TONSILLECTOMY      ULNAR TUNNEL RELEASE     [3]   Family History  Problem Relation Name Age of Onset    Diabetes Mother Adri     Hypertension Mother Adri     Coronary artery disease Father Morris         Dad  with MI 50's.  at age 78    Diabetes Father Morris     Basal cell carcinoma Father Morris     Parkinsonism Father Morris     No Known Problems Maternal Grandmother      No Known Problems Maternal Grandfather      No Known Problems Paternal Grandmother      No Known Problems Paternal Grandfather      Hypertension Brother Morris     Transient ischemic attack Brother Morris     Heart disease Brother Morris     Breast cancer Paternal Aunt Paola         age- 60's    No Known Problems Paternal Aunt      No Known Problems Paternal Aunt      No Known Problems Paternal Aunt      No Known Problems Paternal Aunt     [4]   Allergies  Allergen Reactions    Bee Venom Swelling and Vomiting    Metformin Diarrhea    Other Blisters     Holter Monitor Stickers - Other reaction(s): very sensitive (even to sensitive stickers) - causes welts

## 2025-07-02 NOTE — ASSESSMENT & PLAN NOTE
60 y.o. female presents with signs and symptoms consistent with the above diagnosis.  We discussed the natural history of this condition and its pathogenesis.  We discussed operative and nonoperative treatment options. Her symptoms are consistent with ulnar nerve dysfunction. She is only 9 weeks out from her most recent surgery. She does have some intact function of the ulnar nerve, although with notable weakness and numbnes in the classic ulnar n distribution. Recommend obtaining an updated EMG in 1 month for re-evaluation of her symptoms 3 months after her most recent surgery. It is recommended she return to the office following the EMG to review the results.     C/w therapy in the meantime   Orders:    Ambulatory Referral to Orthopedic Surgery    EMG; Future

## 2025-07-02 NOTE — LETTER
July 2, 2025     Magaly Akhtar MD  [address] [place] [id number]    Patient: Rachel Novak   YOB: 1965   Date of Visit: 7/2/2025       Dear Dr. Magaly Akhtar MD:    Thank you for referring Rachel Novak to me for evaluation. Below are my notes for this consultation.    If you have questions, please do not hesitate to call me. I look forward to following your patient along with you.         Sincerely,        Candido Segundo MD        CC: No Recipients    Candido Segundo MD  7/2/2025  1:42 PM  Sign when Signing Visit  The HAND & UPPER EXTREMITY OFFICE VISIT   Referred By:  Magaly Akhtar Md  [address] [place], [place] [address]      Chief Complaint:     Right hand numbness and weakness    History of Present Illness:   60 y.o., Right hand dominant female presents with right hand numbness and weakness. She has previously undergone multiple surgeries by Dr. Hi for recurrent bilateral cubital tunnel syndrome. She initially had a left cubital tunnel release with intramuscular transposition on 11/15/2018. She then underwent a right cubital tunnel release on 2/24/2020. She reports experiencing relief of her symptoms for about 1 year. She then underwent a revision left cubital tunnel release with revision intramuscular transposition on 1/29/2021, and revision right cubital tunnel release with submuscular transposition on 9/23/2021. She reports experiencing continued symptoms after the revision surgeries. She then had a fall in January 2025 which caused significant aggravation of her right sided symptoms. She underwent a second revision right cubital tunnel release with revision transposition using adipofascial flap on 4/29/25. She reports her symptoms were worse after that procedure, as she now experiences severe numbness in the ring and small fingers, and she notices that her small finger often drifts out to the side.       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 [employer]       Past Medical History:  Past Medical History[1]  Past Surgical History[2]  Family History[3]  Social History     Socioeconomic History   • Marital status: /Civil Union     Spouse name: Lincoln   • Number of children: 6   • Years of education: Not on file   • Highest education level: Not on file   Occupational History   • Not on file   Tobacco Use   • Smoking status: Never   • Smokeless tobacco: Never   Vaping Use   • Vaping status: Never Used   Substance and Sexual Activity   • Alcohol use: Yes     Comment: social   • Drug use: Never   • Sexual activity: Yes     Partners: Male     Birth control/protection: Female Sterilization     Comment: Defer   Other Topics Concern   • Not on file   Social History Narrative    Lives at home with ,  10 years-  has MS, still working    3 kids all local, 8 grandkids, infant granddaughter passed away in march from polycystic kidney disease    Working at [employer] for a year     Social Drivers of Health     Financial Resource Strain: Not on file   Food Insecurity: Not on file   Transportation Needs: Not on file   Physical Activity: Not on file   Stress: Not on file   Social Connections: Unknown (6/18/2024)    Received from [employer]    • How often do you feel lonely or isolated from those around you? (Adult - for ages 18 years and over): Not on file   Intimate Partner Violence: Not on file   Housing Stability: Not on file     Scheduled Meds:  Continuous Infusions:No current facility-administered medications for this visit.    PRN Meds:.  Allergies[4]        Physical Examination:    Ht 5' 7\" (1.702 m)   Wt 74.4 kg (164 lb)   BMI 25.69 kg/m²     Gen: A&Ox3, NAD  Cardiac: regular rate  Chest: non labored breathing  Abdomen: Non-distended    Right Upper Extremity:  Skin CDI  Positive atrophy of first dorsal interosseous  No " obvious deformity of the shoulder, arm, elbow, forearm, wrist, hand  Diminished sensation to light touch in ulnar nerve distribution. Sensation intact to light touch in the axillary median, and radial nerve distributions  5/5 thumb abduction  5/5 thumb opposition  3/5 first dorsal interosseous  1/5 FDP SF  3/5 FDP RF with limited motion  5/5 FDP IF  5/5 FCU, wrist flexion  Positive small finger escape sign  Near-complete composite fist, small finger DIP limited motion  Warm, well-perfused digits  Cap refill <2s  2 point discrimination:   Thumb 5 mm  Index 5 mm  Middle 5 mm  Radial Ring 5 mm  Ulnar Ring 15 mm  Small >15 mm, only able to feel pressure  Positive Tinel's over course of ulnar nerve 7 cm distal to medial epicondyle      Left Upper Extremity:  Skin CDI  No obvious deformity of the shoulder, arm, elbow, forearm, wrist, hand  Sensation intact to light touch in the axillary median, ulnar, and radial nerve distributions  5/5 thumb abduction  5/5 thumb opposition  5/5 first dorsal interosseous  Warm, well-perfused digits  Cap refill <2s  2 point discrimination:   Thumb 5 mm  Index 5 mm  Middle 5 mm  Radial Ring 5 mm  Ulnar Ring 5 mm  Small 5 mm      Studies:  10/31/2018:  Nerve Conduction Study/EM. Normal bilateral median motor latency with normal amplitude and normal NCV.   2. Normal bilateral ulnar motor latency with normal amplitude. Significant decreased NCV across the bilateral elbows were noted.  3. Normal bilateral median sensory latency with normal amplitude.   4. Mildly prolonged left ulnar sensory latency with decreased amplitude. Normal right ulnar sensory latency with decreased amplitude  5. Normal bilateral numb thumb tests  6. EMG examination of the both upper extremities are grossly within normal limits.  7. Skin temperature is 31 degrees C  Assessment:   1. This is an abnormal test  2. There is an electrodiagnostic evidence of bilateral cubital tunnel syndromes, moderate in degree.  3.  There is no electrodiagnostic evidence of bilateral median neuropathy or entrapment such as carpal tunnel syndromes.       12/16/2020 - EMG: The left ulnar and deep escoto motor distal latencies and cmap amplitues are normal at the wrist. The motor ncv values are normal in below the elbow-wrist and across elbow segments with mild decreasing velocity across the elebow compared to the distal segment without significant accompanying amplitude decrement.   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       3/21/2025 - EMG: evaluation of the right ulnar motor nerve showed decreased conduction velocity. Right ulnar sensory nerve showed reduced amplitude. All remaining nerves within normal limits. All examined msucles showed no evidence of electrical instability.   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 Normal right median nerve values.     Labs:  I personally reviewed the following labs,   Lab Results   Component Value Date    HGBA1C 6.1 (H) 12/05/2024    HGBA1C 6.0 (H) 04/29/2024    HGBA1C 5.8 08/23/2022    HGBA1C 5.8 02/21/2022         Assessment & Plan  Numbness and tingling in right hand  History of decompression of both ulnar nerves  60 y.o. female presents with signs and symptoms consistent with the above diagnosis.  We discussed the natural history of this condition and its pathogenesis.  We discussed operative and nonoperative treatment options. Her symptoms are consistent with ulnar nerve dysfunction. She is only 9 weeks out from her most recent surgery. She does have some intact function of the ulnar nerve, although with notable weakness and numbnes in the classic ulnar n distribution. Recommend obtaining an updated EMG in 1 month for re-evaluation of her symptoms 3 months after her most recent surgery. It is recommended she return to the office following the EMG to review the  results.     C/w therapy in the meantime   Orders:  •  Ambulatory Referral to Orthopedic Surgery  •  EMG; Future    she expressed understanding of the plan and agreed. We encouraged them to contact our office with any questions or concerns.         Candido Segundo MD  Hand and Upper Extremity Surgery        *This note was dictated using Dragon voice recognition software. Please excuse any word substitutions or errors.*      Scribe Attestation      I,:  Cassy Sanz PA-C am acting as a scribe while in the presence of the attending physician.:       I,:  Candido Segundo MD personally performed the services described in this documentation    as scribed in my presence.:                  [1]   Past Medical History:  Diagnosis Date   • Allergic    • Anxiety    • Depression    • Hypothyroidism     was previously on medication   • Prediabetes    • Syncope    • Urge incontinence    • Vitamin D deficiency    [2]   Past Surgical History:  Procedure Laterality Date   • CHOLECYSTECTOMY     • HYSTERECTOMY     • TONSILLECTOMY     • ULNAR TUNNEL RELEASE     [3]   Family History  Problem Relation Name Age of Onset   • Diabetes Mother Adri    • Hypertension Mother Adri    • Coronary artery disease Father Morris         Dad  with MI 50's.  at age 78   • Diabetes Father Morris    • Basal cell carcinoma Father Morris    • Parkinsonism Father Morris    • No Known Problems Maternal Grandmother     • No Known Problems Maternal Grandfather     • No Known Problems Paternal Grandmother     • No Known Problems Paternal Grandfather     • Hypertension Brother Morris    • Transient ischemic attack Brother Morris    • Heart disease Brother Morris    • Breast cancer Paternal Aunt Paola         age- 60's   • No Known Problems Paternal Aunt     • No Known Problems Paternal Aunt     • No Known Problems Paternal Aunt     • No Known Problems Paternal Aunt     [4]   Allergies  Allergen Reactions   • Bee Venom Swelling and Vomiting    • Metformin Diarrhea   • Other Blisters     Holter Monitor Stickers - Other reaction(s): very sensitive (even to sensitive stickers) - causes welts         [1]  Past Medical History:  Diagnosis Date   • Allergic    • Anxiety    • Depression    • Hypothyroidism     was previously on medication   • Prediabetes    • Syncope    • Urge incontinence    • Vitamin D deficiency    [2]  Past Surgical History:  Procedure Laterality Date   • CHOLECYSTECTOMY     • HYSTERECTOMY     • TONSILLECTOMY     • ULNAR TUNNEL RELEASE     [3]  Family History  Problem Relation Name Age of Onset   • Diabetes Mother Adri    • Hypertension Mother Adri    • Coronary artery disease Father Morris         Dad  with MI 50's.  at age 78   • Diabetes Father Morris    • Basal cell carcinoma Father Morris    • Parkinsonism Father Morris    • No Known Problems Maternal Grandmother     • No Known Problems Maternal Grandfather     • No Known Problems Paternal Grandmother     • No Known Problems Paternal Grandfather     • Hypertension Brother Morris    • Transient ischemic attack Brother Morris    • Heart disease Brother Morris    • Breast cancer Paternal Aunt Paola         age- 60's   • No Known Problems Paternal Aunt     • No Known Problems Paternal Aunt     • No Known Problems Paternal Aunt     • No Known Problems Paternal Aunt     [4]  Allergies  Allergen Reactions   • Bee Venom Swelling and Vomiting   • Metformin Diarrhea   • Other Blisters     Holter Monitor Stickers - Other reaction(s): very sensitive (even to sensitive stickers) - causes welts    [5]  Past Medical History:  Diagnosis Date   • Allergic    • Anxiety    • Depression    • Hypothyroidism     was previously on medication   • Prediabetes    • Syncope    • Urge incontinence    • Vitamin D deficiency    [6]  Past Surgical History:  Procedure Laterality Date   • CHOLECYSTECTOMY     • HYSTERECTOMY     • TONSILLECTOMY     • ULNAR TUNNEL RELEASE     [7]  Family History  Problem  Relation Name Age of Onset   • Diabetes Mother Adri    • Hypertension Mother Adri    • Coronary artery disease Father Morris         Dad  with MI 50's.  at age 78   • Diabetes Father Morris    • Basal cell carcinoma Father Morris    • Parkinsonism Father Morris    • No Known Problems Maternal Grandmother     • No Known Problems Maternal Grandfather     • No Known Problems Paternal Grandmother     • No Known Problems Paternal Grandfather     • Hypertension Brother Morris    • Transient ischemic attack Brother Morris    • Heart disease Brother Morris    • Breast cancer Paternal Aunt Paola         age- 60's   • No Known Problems Paternal Aunt     • No Known Problems Paternal Aunt     • No Known Problems Paternal Aunt     • No Known Problems Paternal Aunt     [8]  Allergies  Allergen Reactions   • Bee Venom Swelling and Vomiting   • Metformin Diarrhea   • Other Blisters     Holter Monitor Stickers - Other reaction(s): very sensitive (even to sensitive stickers) - causes welts

## 2025-07-02 NOTE — PROGRESS NOTES
The HAND & UPPER EXTREMITY OFFICE VISIT   Referred By:  Magaly Akhtar Md  [address] [facility] [place] [address]      Chief Complaint:     ***  DOI: ***    History of Present Illness:   60 y.o., {MISC; OT HAND DOMINANCE:9845815467} hand dominant female presents with ***      11/15/2018 - Left cubital tunnel decompression    2/24/2020 - Right cubital tunnel decompression    1/29/2021 - revision left cubital tunnel release wit intramuscular transposition    9/23/2021 - revision right cubital tunnel release with submuscular transposition    4/29/25 - revision right cubital tunnel     Had numbness and tingling prior to most recent surgery, now symptoms are worse because she has complete numbness in the ring and small fingers and right small finger drifts out to side      ADLs: {ADLs:05378}  Smoke: {Actions; denies-reports:641873} ETOH: {Actions; denies-reports:643130}   Drugs:  {Actions; denies-reports:115506} Job: {Work history:78891}       Past Medical History:  Past Medical History[1]  Past Surgical History[2]  Family History[3]  Social History     Socioeconomic History    Marital status: /Civil Union     Spouse name: Lincoln    Number of children: 6    Years of education: Not on file    Highest education level: Not on file   Occupational History    Not on file   Tobacco Use    Smoking status: Never    Smokeless tobacco: Never   Vaping Use    Vaping status: Never Used   Substance and Sexual Activity    Alcohol use: Yes     Comment: social    Drug use: Never    Sexual activity: Yes     Partners: Male     Birth control/protection: Female Sterilization     Comment: Defer   Other Topics Concern    Not on file   Social History Narrative    Lives at home with ,  10 years-  has MS, still working    3 kids all local, 8 grandkids, infant granddaughter passed away in march from polycystic kidney disease    Working at [employer] for a year     Social Drivers of Health     Financial Resource  "Strain: Not on file   Food Insecurity: Not on file   Transportation Needs: Not on file   Physical Activity: Not on file   Stress: Not on file   Social Connections: Unknown (6/18/2024)    Received from [employer]     How often do you feel lonely or isolated from those around you? (Adult - for ages 18 years and over): Not on file   Intimate Partner Violence: Not on file   Housing Stability: Not on file     Scheduled Meds:  Continuous Infusions:No current facility-administered medications for this visit.    PRN Meds:.  Allergies[4]        Physical Examination:    Ht 5' 7\" (1.702 m)   Wt 74.4 kg (164 lb)   BMI 25.69 kg/m²     Gen: A&Ox3, NAD  Cardiac: regular rate  Chest: non labored breathing  Abdomen: Non-distended    Right Upper Extremity:  Skin CDI  Positive atrophy of first dorsal interosseous  No obvious deformity of the shoulder, arm, elbow, forearm, wrist, hand  TTP ***  Sensation intact to light touch in the axillary median, ulnar, and radial nerve distributions  5/5 thumb abduction  5/5 thumb opposition  3/5 first dorsal interosseous  1/5 FDP 5  3/5 FDP RF with limited motion  5/5 FDP IF  5/5 FCU  Positive small finger escape sign  Near-complete composite fist, small finger DIP limited motion  Warm, well-perfused digits  Cap refill <2s  2 point discrimination:   Thumb 5 mm  Index 5 mm  Middle 5 mm  Radial Ring 5 mm  Ulnar Ring 15 mm  Small >15 mm, only able to feel pressure  Positive Tinel's over course of ulnar nerve 7 cm distal to medial epicondyle    {positive negative:80018::\"Negative\"} Tinel's at the carpal tunnel  {positive negative:61382::\"Negative\"} Durkan's    {positive negative:36382::\"Negative\"} elbow flexion compression test       Left Upper Extremity:  Skin CDI  No obvious deformity of the shoulder, arm, elbow, forearm, wrist, hand  TTP ***  Sensation intact to light touch in the axillary median, ulnar, and radial nerve distributions  5/5 thumb abduction  5/5 thumb " "opposition  5/5 first dorsal interosseous  Warm, well-perfused digits  Cap refill <2s  2 point discrimination:   Thumb 5 mm  Index 5 mm  Middle 5 mm  Radial Ring 5 mm  Ulnar Ring 5 mm  Small 5 mm      {positive negative:00063::\"Negative\"} Tinel's at the carpal tunnel  {positive negative:14393::\"Negative\"} Durkan's  {positive negative:78482::\"Negative\"} Tinel's at the cubital tunnel   {positive negative:69177::\"Negative\"} elbow flexion compression test      Studies:  Radiographs: I personally reviewed and independently interpreted the available radiographs.  ***: Radiographs of the ***, multiple views, demonstrate ***.     10/31/2018:  Nerve Conduction Study/EM. Normal bilateral median motor latency with normal amplitude and normal NCV.   2. Normal bilateral ulnar motor latency with normal amplitude. Significant decreased NCV across the bilateral elbows were noted.  3. Normal bilateral median sensory latency with normal amplitude.   4. Mildly prolonged left ulnar sensory latency with decreased amplitude. Normal right ulnar sensory latency with decreased amplitude  5. Normal bilateral numb thumb tests  6. EMG examination of the both upper extremities are grossly within normal limits.  7. Skin temperature is 31 degrees C  Assessment:   1. This is an abnormal test  2. There is an electrodiagnostic evidence of bilateral cubital tunnel syndromes, moderate in degree.  3. There is no electrodiagnostic evidence of bilateral median neuropathy or entrapment such as carpal tunnel syndromes.       2020 ***    Labs:  I personally reviewed the following labs,   Lab Results   Component Value Date    HGBA1C 6.1 (H) 2024    HGBA1C 6.0 (H) 2024    HGBA1C 5.8 2022    HGBA1C 5.8 2022         Assessment & Plan  Numbness and tingling in right hand    Orders:    Ambulatory Referral to Orthopedic Surgery    EMG; Future    History of decompression of both ulnar nerves    Orders:    Ambulatory Referral to " Orthopedic Surgery    EMG; Future    Fall, sequela    Orders:    Ambulatory Referral to Orthopedic Surgery        60 y.o. female presents with signs and symptoms consistent with the above diagnosis.  We discussed the natural history of this condition and its pathogenesis.  We discussed operative and nonoperative treatment options. ***    It is recommended she return to the office {Follow Up:61863}    she expressed understanding of the plan and agreed. We encouraged them to contact our office with any questions or concerns.         Candido Segundo MD  Hand and Upper Extremity Surgery        *This note was dictated using Dragon voice recognition software. Please excuse any word substitutions or errors.*      Scribe Attestation      I,:   am acting as a scribe while in the presence of the attending physician.:       I,:   personally performed the services described in this documentation    as scribed in my presence.:                  [1]   Past Medical History:  Diagnosis Date    Allergic     Anxiety     Depression     Hypothyroidism     was previously on medication    Prediabetes     Syncope     Urge incontinence     Vitamin D deficiency    [2]   Past Surgical History:  Procedure Laterality Date    CHOLECYSTECTOMY      HYSTERECTOMY      TONSILLECTOMY      ULNAR TUNNEL RELEASE     [3]   Family History  Problem Relation Name Age of Onset    Diabetes Mother Adri     Hypertension Mother Adri     Coronary artery disease Father Morris         Dad  with MI 50's.  at age 78    Diabetes Father Morris     Basal cell carcinoma Father Morris     Parkinsonism Father Morris     No Known Problems Maternal Grandmother      No Known Problems Maternal Grandfather      No Known Problems Paternal Grandmother      No Known Problems Paternal Grandfather      Hypertension Brother Morris     Transient ischemic attack Brother Morris     Heart disease Brother Morris     Breast cancer Paternal Aunt Paola         age- 60's    No  Known Problems Paternal Aunt      No Known Problems Paternal Aunt      No Known Problems Paternal Aunt      No Known Problems Paternal Aunt     [4]   Allergies  Allergen Reactions    Bee Venom Swelling and Vomiting    Metformin Diarrhea    Other Blisters     Holter Monitor Stickers - Other reaction(s): very sensitive (even to sensitive stickers) - causes welts

## 2025-08-08 ENCOUNTER — OFFICE VISIT (OUTPATIENT)
Dept: CARDIOLOGY CLINIC | Facility: CLINIC | Age: 60
End: 2025-08-08
Payer: COMMERCIAL

## 2025-08-08 VITALS
TEMPERATURE: 97.2 F | HEART RATE: 54 BPM | OXYGEN SATURATION: 97 % | WEIGHT: 167 LBS | BODY MASS INDEX: 26.21 KG/M2 | SYSTOLIC BLOOD PRESSURE: 118 MMHG | DIASTOLIC BLOOD PRESSURE: 70 MMHG | HEIGHT: 67 IN

## 2025-08-08 DIAGNOSIS — E78.00 PURE HYPERCHOLESTEROLEMIA: ICD-10-CM

## 2025-08-08 DIAGNOSIS — R94.31 ABNORMAL ECG DURING EXERCISE STRESS TEST: ICD-10-CM

## 2025-08-08 DIAGNOSIS — G47.33 OSA (OBSTRUCTIVE SLEEP APNEA): ICD-10-CM

## 2025-08-08 DIAGNOSIS — R07.2 PRECORDIAL PAIN: ICD-10-CM

## 2025-08-08 DIAGNOSIS — I47.10 SVT (SUPRAVENTRICULAR TACHYCARDIA) (HCC): Primary | ICD-10-CM

## 2025-08-08 PROCEDURE — 99214 OFFICE O/P EST MOD 30 MIN: CPT | Performed by: NURSE PRACTITIONER

## 2025-08-08 PROCEDURE — 93000 ELECTROCARDIOGRAM COMPLETE: CPT | Performed by: NURSE PRACTITIONER
